# Patient Record
Sex: MALE | Race: WHITE | NOT HISPANIC OR LATINO | Employment: STUDENT | URBAN - METROPOLITAN AREA
[De-identification: names, ages, dates, MRNs, and addresses within clinical notes are randomized per-mention and may not be internally consistent; named-entity substitution may affect disease eponyms.]

---

## 2017-02-21 ENCOUNTER — TELEPHONE (OUTPATIENT)
Dept: FAMILY MEDICINE | Facility: CLINIC | Age: 22
End: 2017-02-21

## 2017-02-21 ENCOUNTER — HOSPITAL ENCOUNTER (EMERGENCY)
Facility: CLINIC | Age: 22
Discharge: HOME | End: 2017-02-21
Attending: FAMILY MEDICINE | Admitting: FAMILY MEDICINE
Payer: COMMERCIAL

## 2017-02-21 VITALS
SYSTOLIC BLOOD PRESSURE: 122 MMHG | HEIGHT: 69 IN | DIASTOLIC BLOOD PRESSURE: 70 MMHG | OXYGEN SATURATION: 98 % | BODY MASS INDEX: 19.99 KG/M2 | WEIGHT: 135 LBS | TEMPERATURE: 97.6 F | RESPIRATION RATE: 14 BRPM

## 2017-02-21 DIAGNOSIS — R11.2 NON-INTRACTABLE VOMITING WITH NAUSEA, UNSPECIFIED VOMITING TYPE: ICD-10-CM

## 2017-02-21 LAB
ANION GAP SERPL CALCULATED.3IONS-SCNC: 9 MMOL/L (ref 3–14)
BASOPHILS # BLD AUTO: 0 10E9/L (ref 0–0.2)
BASOPHILS NFR BLD AUTO: 0.1 %
BUN SERPL-MCNC: 22 MG/DL (ref 7–30)
CALCIUM SERPL-MCNC: 9 MG/DL (ref 8.5–10.1)
CHLORIDE SERPL-SCNC: 104 MMOL/L (ref 94–109)
CO2 SERPL-SCNC: 26 MMOL/L (ref 20–32)
CREAT SERPL-MCNC: 0.84 MG/DL (ref 0.66–1.25)
CRP SERPL-MCNC: <2.9 MG/L (ref 0–8)
DIFFERENTIAL METHOD BLD: ABNORMAL
EOSINOPHIL # BLD AUTO: 0 10E9/L (ref 0–0.7)
EOSINOPHIL NFR BLD AUTO: 0.1 %
ERYTHROCYTE [DISTWIDTH] IN BLOOD BY AUTOMATED COUNT: 12.5 % (ref 10–15)
GFR SERPL CREATININE-BSD FRML MDRD: ABNORMAL ML/MIN/1.7M2
GLUCOSE SERPL-MCNC: 102 MG/DL (ref 70–99)
HCT VFR BLD AUTO: 44.8 % (ref 40–53)
HGB BLD-MCNC: 16.1 G/DL (ref 13.3–17.7)
IMM GRANULOCYTES # BLD: 0 10E9/L (ref 0–0.4)
IMM GRANULOCYTES NFR BLD: 0.2 %
LYMPHOCYTES # BLD AUTO: 0.8 10E9/L (ref 0.8–5.3)
LYMPHOCYTES NFR BLD AUTO: 6 %
MCH RBC QN AUTO: 29.5 PG (ref 26.5–33)
MCHC RBC AUTO-ENTMCNC: 35.9 G/DL (ref 31.5–36.5)
MCV RBC AUTO: 82 FL (ref 78–100)
MONOCYTES # BLD AUTO: 0.8 10E9/L (ref 0–1.3)
MONOCYTES NFR BLD AUTO: 6.3 %
NEUTROPHILS # BLD AUTO: 11 10E9/L (ref 1.6–8.3)
NEUTROPHILS NFR BLD AUTO: 87.3 %
PLATELET # BLD AUTO: 227 10E9/L (ref 150–450)
POTASSIUM SERPL-SCNC: 3.5 MMOL/L (ref 3.4–5.3)
RBC # BLD AUTO: 5.46 10E12/L (ref 4.4–5.9)
SODIUM SERPL-SCNC: 139 MMOL/L (ref 133–144)
WBC # BLD AUTO: 12.6 10E9/L (ref 4–11)

## 2017-02-21 PROCEDURE — 25000125 ZZHC RX 250: Performed by: FAMILY MEDICINE

## 2017-02-21 PROCEDURE — 99284 EMERGENCY DEPT VISIT MOD MDM: CPT | Mod: 25

## 2017-02-21 PROCEDURE — 86140 C-REACTIVE PROTEIN: CPT | Performed by: FAMILY MEDICINE

## 2017-02-21 PROCEDURE — 99284 EMERGENCY DEPT VISIT MOD MDM: CPT | Performed by: FAMILY MEDICINE

## 2017-02-21 PROCEDURE — 80048 BASIC METABOLIC PNL TOTAL CA: CPT | Performed by: FAMILY MEDICINE

## 2017-02-21 PROCEDURE — 96361 HYDRATE IV INFUSION ADD-ON: CPT

## 2017-02-21 PROCEDURE — 96375 TX/PRO/DX INJ NEW DRUG ADDON: CPT

## 2017-02-21 PROCEDURE — 96374 THER/PROPH/DIAG INJ IV PUSH: CPT

## 2017-02-21 PROCEDURE — 85025 COMPLETE CBC W/AUTO DIFF WBC: CPT | Performed by: FAMILY MEDICINE

## 2017-02-21 PROCEDURE — 25000128 H RX IP 250 OP 636: Performed by: FAMILY MEDICINE

## 2017-02-21 RX ORDER — NAPROXEN 500 MG/1
TABLET ORAL
Qty: 15 TABLET | Refills: 0 | Status: SHIPPED | OUTPATIENT
Start: 2017-02-21 | End: 2017-06-06

## 2017-02-21 RX ORDER — ONDANSETRON 4 MG/1
4 TABLET, ORALLY DISINTEGRATING ORAL
Status: COMPLETED | OUTPATIENT
Start: 2017-02-21 | End: 2017-02-21

## 2017-02-21 RX ORDER — SODIUM CHLORIDE 9 MG/ML
1000 INJECTION, SOLUTION INTRAVENOUS CONTINUOUS
Status: DISCONTINUED | OUTPATIENT
Start: 2017-02-21 | End: 2017-02-21 | Stop reason: HOSPADM

## 2017-02-21 RX ORDER — KETOROLAC TROMETHAMINE 30 MG/ML
30 INJECTION, SOLUTION INTRAMUSCULAR; INTRAVENOUS ONCE
Status: COMPLETED | OUTPATIENT
Start: 2017-02-21 | End: 2017-02-21

## 2017-02-21 RX ORDER — ONDANSETRON 4 MG/1
2-4 TABLET, ORALLY DISINTEGRATING ORAL EVERY 8 HOURS PRN
Qty: 10 TABLET | Refills: 0 | Status: SHIPPED | OUTPATIENT
Start: 2017-02-21 | End: 2017-06-06

## 2017-02-21 RX ORDER — LIDOCAINE 40 MG/G
CREAM TOPICAL
Status: DISCONTINUED | OUTPATIENT
Start: 2017-02-21 | End: 2017-02-21 | Stop reason: HOSPADM

## 2017-02-21 RX ADMIN — ONDANSETRON 4 MG: 4 TABLET, ORALLY DISINTEGRATING ORAL at 04:41

## 2017-02-21 RX ADMIN — KETOROLAC TROMETHAMINE 30 MG: 30 INJECTION, SOLUTION INTRAMUSCULAR at 05:23

## 2017-02-21 RX ADMIN — PROCHLORPERAZINE EDISYLATE 5 MG: 5 INJECTION INTRAMUSCULAR; INTRAVENOUS at 05:20

## 2017-02-21 RX ADMIN — SODIUM CHLORIDE 1000 ML: 9 INJECTION, SOLUTION INTRAVENOUS at 05:20

## 2017-02-21 ASSESSMENT — ENCOUNTER SYMPTOMS
RESPIRATORY NEGATIVE: 1
NAUSEA: 1
EYES NEGATIVE: 1
BLOOD IN STOOL: 0
FEVER: 0
CARDIOVASCULAR NEGATIVE: 1
CONSTIPATION: 0
HEMATURIA: 0
FATIGUE: 1
ACTIVITY CHANGE: 1
PSYCHIATRIC NEGATIVE: 1
ANAL BLEEDING: 0
NEUROLOGICAL NEGATIVE: 1
DIARRHEA: 0
CHILLS: 1
VOMITING: 1
APPETITE CHANGE: 1
FLANK PAIN: 0

## 2017-02-21 NOTE — ED PROVIDER NOTES
History     Chief Complaint   Patient presents with     Vomiting     HPI  Steven Romeo is a 21 year old male who presented to the emergency room secondary to concerns about nausea and vomiting symptoms that started approximately 12:30 this morning.  States that he is was awakened from his sleep with sudden nausea and vomiting symptoms.  He's vomited multiple times since.  He states that his abdomen is sore but he thinks it just from puking so hard as he does not have significant pain otherwise.  He states his nausea somewhat improved after receiving the oral Zofran given to him by the nursing staff on arrival to the ER.  He denies vomiting blood.  Patient works at the [employer] and states that he was warned about the Noro-virus which has been present in the area recently.  He wonders if he might have this viral infection as a reason for his symptoms today.  He denies any diarrhea but states that he feels like he might have some soon.    I have reviewed the Medications, Allergies, Past Medical and Surgical History, and Social History in the Epic system.  Patient Active Problem List   Diagnosis     Generalized anxiety disorder     Moderate major depression (H)     Tobacco abuse     Hydrocephalus     Pineal germ cell tumor (H)     Rash and nonspecific skin eruption     Health Care Home     Status post chemotherapy       Past Medical History   Diagnosis Date     Hydrocephalus 2015     See MRI/CT     Infectious mononucleosis      age 7     Other chronic pain      headaches     Pineal tumor 2015     see MRI/CT     Pneumonia 9/13/2011     Pneumothorax on left 11/2014     spontaneous     Spontaneous pneumothorax 11/9/2014        Past Surgical History   Procedure Laterality Date     Insert chest tube Left 11/10/2014     Procedure: INSERT CHEST TUBE;  Surgeon: Damaso Westbrook MD;  Location: [facility]     Thoracoscopic pleurodesis Left 11/13/2014     Procedure: THORACOSCOPIC PLEURODESIS;  Surgeon: Dariana  MD Damsao;  Location: [facility] OR     Thoracoscopic blebectomy Left 11/13/2014     Procedure: THORACOSCOPIC BLEBECTOMY;  Surgeon: Damaso Westbrook MD;  Location: [facility] OR     Ventriculoscopy Right 3/25/2015     Procedure: VENTRICULOSCOPY;  Surgeon: Vasile Boyd MD;  Location: [facility]     Remove port vascular access Right 10/12/2015     Procedure: REMOVE PORT VASCULAR ACCESS;  Surgeon: Renato Arroyo MD;  [facility]: [facility]        Family History   Problem Relation Age of Onset     Family History Negative         Social History     Social History     Marital status: Single     Spouse name: N/A     Number of children: N/A     Years of education: N/A     Occupational History     Not on file.     Social History Main Topics     Smoking status: Current Every Day Smoker     Packs/day: 0.25     Years: 1.00     Types: Cigarettes     Last attempt to quit: 12/9/2014     Smokeless tobacco: Never Used      Comment: Mom smokes outside.     Alcohol use 0.0 oz/week     0 Standard drinks or equivalent per week      Comment: occasionally     Drug use: No     Sexual activity: No     Other Topics Concern     Not on file     Social History Narrative    Currently staying in the [facility] [place] with mom. Otherwise living in [place]. Home is in [place]. He states that he lost all of his friends when he got cancer as they were afraid they might get it. He denies any illicite drugs but states when this is all done he is going to celebrate with a beer.        Current Outpatient Rx   Medication Sig Dispense Refill     ondansetron (ZOFRAN-ODT) 4 MG ODT tab Take 0.5-1 tablets (2-4 mg) by mouth every 8 hours as needed for nausea 10 tablet 0     naproxen (NAPROSYN) 500 MG tablet 1 tablet (500mg) every 8-12 hours as needed for pain. Take with food and do not use with ibuprofen. 15 tablet 0       Allergies   Allergen Reactions     Gadolinium Itching     Redness/Itching immediately with Gadolinium injection.  Patient reacts even with  "premedication.  [facility] 4-1--2016       Immunization History   Administered Date(s) Administered     DPT 1995, 1995, 02/06/1996, 10/16/1996     DT (PEDS <7y) 04/19/2007     HIB 1995, 1995, 02/06/1996, 10/16/1996     Hepatitis A Vac Ped/Adol-2 Dose 04/19/2007     Hepatitis B 1995, 1995, 02/06/1996     Influenza (IIV3) 12/05/2011     Influenza Vaccine IM 3yrs+ 4 Valent IIV4 03/30/2015     MMR 10/16/1996, 03/22/2002     OPV 1995, 1995, 02/06/1996     Pneumococcal 23 valent 12/05/2011     Varicella Not Indicated - By Hx 10/04/2000       Review of Systems   Constitutional: Positive for activity change, appetite change, chills and fatigue. Negative for fever.   HENT: Negative.    Eyes: Negative.    Respiratory: Negative.    Cardiovascular: Negative.    Gastrointestinal: Positive for nausea and vomiting. Negative for anal bleeding, blood in stool, constipation and diarrhea.   Genitourinary: Negative for flank pain and hematuria.   Skin: Negative.    Neurological: Negative.    Psychiatric/Behavioral: Negative.    All other systems reviewed and are negative.      Physical Exam   BP: 102/60  Heart Rate: 100  Temp: 97.6  F (36.4  C)  Resp: 16  Height: 175.3 cm (5' 9\")  Weight: 61.2 kg (135 lb)  SpO2: 100 %  Physical Exam   Constitutional: He is oriented to person, place, and time. He appears well-developed and well-nourished. He appears distressed.   HENT:   Head: Atraumatic.   Mouth/Throat: Oropharynx is clear and moist.   Eyes: Conjunctivae and EOM are normal. Pupils are equal, round, and reactive to light.   Neck: Normal range of motion. Neck supple.   Cardiovascular: Normal rate.    Pulmonary/Chest: Effort normal. No respiratory distress.   Abdominal: Soft. He exhibits no distension and no mass. There is tenderness (mild diffuse tenderness but no point specific tenderness on exam of the abdomen.). There is no rebound and no guarding.   Musculoskeletal: Normal range of motion. "   Neurological: He is alert and oriented to person, place, and time.   Skin: Skin is warm. No rash noted.   Nursing note and vitals reviewed.      ED Course     ED Course     Procedures             Critical Care time:  none               Results for orders placed or performed during the hospital encounter of 02/21/17 (from the past 24 hour(s))   CBC with platelets differential   Result Value Ref Range    WBC 12.6 (H) 4.0 - 11.0 10e9/L    RBC Count 5.46 4.4 - 5.9 10e12/L    Hemoglobin 16.1 13.3 - 17.7 g/dL    Hematocrit 44.8 40.0 - 53.0 %    MCV 82 78 - 100 fl    MCH 29.5 26.5 - 33.0 pg    MCHC 35.9 31.5 - 36.5 g/dL    RDW 12.5 10.0 - 15.0 %    Platelet Count 227 150 - 450 10e9/L    Diff Method Automated Method     % Neutrophils 87.3 %    % Lymphocytes 6.0 %    % Monocytes 6.3 %    % Eosinophils 0.1 %    % Basophils 0.1 %    % Immature Granulocytes 0.2 %    Absolute Neutrophil 11.0 (H) 1.6 - 8.3 10e9/L    Absolute Lymphocytes 0.8 0.8 - 5.3 10e9/L    Absolute Monocytes 0.8 0.0 - 1.3 10e9/L    Absolute Eosinophils 0.0 0.0 - 0.7 10e9/L    Absolute Basophils 0.0 0.0 - 0.2 10e9/L    Abs Immature Granulocytes 0.0 0 - 0.4 10e9/L   Basic metabolic panel   Result Value Ref Range    Sodium 139 133 - 144 mmol/L    Potassium 3.5 3.4 - 5.3 mmol/L    Chloride 104 94 - 109 mmol/L    Carbon Dioxide 26 20 - 32 mmol/L    Anion Gap 9 3 - 14 mmol/L    Glucose 102 (H) 70 - 99 mg/dL    Urea Nitrogen 22 7 - 30 mg/dL    Creatinine 0.84 0.66 - 1.25 mg/dL    GFR Estimate >90  Non  GFR Calc   >60 mL/min/1.7m2    GFR Estimate If Black >90   GFR Calc   >60 mL/min/1.7m2    Calcium 9.0 8.5 - 10.1 mg/dL   CRP inflammation   Result Value Ref Range    CRP Inflammation <2.9 0.0 - 8.0 mg/L       Medications ordered to be administered in the ER:    Medications   lidocaine 1 % 1 mL (not administered)   lidocaine (LMX4) kit (not administered)   sodium chloride (PF) 0.9% PF flush 3 mL (not administered)   sodium chloride  (PF) 0.9% PF flush 3 mL (not administered)   0.9% sodium chloride BOLUS (not administered)     Followed by   0.9% sodium chloride BOLUS (1,000 mLs Intravenous New Bag 2/21/17 0520)     Followed by   0.9% sodium chloride infusion (not administered)   ondansetron (ZOFRAN-ODT) ODT tab 4 mg (4 mg Oral Given 2/21/17 6941)   prochlorperazine (COMPAZINE) injection 5 mg (5 mg Intravenous Given 2/21/17 0520)   ketorolac (TORADOL) injection 30 mg (30 mg Intravenous Given 2/21/17 0523)       Assessments & Plan (with Medical Decision Making)  Patient states he is feeling much improved after the IV fluids and medications given to him as listed above.  He states he would like to go home and sleep.  Is unable to tolerate oral fluids and ice chips without additional nausea or vomiting.  Medication prescribed to use if needed for additional symptoms.  He agrees to return should he have worsening symptoms.  Handouts given to him to read and follow.     I have reviewed the nursing notes.    I have reviewed the findings, diagnosis, plan and need for follow up with the patient and his mother.    New Prescriptions    NAPROXEN (NAPROSYN) 500 MG TABLET    1 tablet (500mg) every 8-12 hours as needed for pain. Take with food and do not use with ibuprofen.    ONDANSETRON (ZOFRAN-ODT) 4 MG ODT TAB    Take 0.5-1 tablets (2-4 mg) by mouth every 8 hours as needed for nausea            I verbally discussed the findings of the evaluation today in the ER. I have verbally discussed with Steven the suggested treatment(s) as described in the discharge instructions and handouts. I have prescribed the above listed medications and instructed him on appropriate use of these medications.      I have verbally suggested he follow-up in his clinic or return to the ER for increased symptoms. See the follow-up recommendations documented  in the after visit summary in this visit's EPIC chart.      Final diagnoses:   Non-intractable vomiting with nausea,  unspecified vomiting type       2/21/2017   [facility] EMERGENCY DEPARTMENT     Priyank Gold, DO  02/21/17 0613       Priyank Gold, DO  02/21/17 0618

## 2017-02-21 NOTE — ED AVS SNAPSHOT
[facility] Emergency Department    [address]     [place] [address]    Phone:  [phone]    Fax:  [phone]                                       Steven Romeo   MRN: [id number]    Department:  [facility] Emergency Department   Date of Visit:  2/21/2017           Patient Information     Date Of Birth          1995        Your diagnoses for this visit were:     Non-intractable vomiting with nausea, unspecified vomiting type        You were seen by Priyank Gold DO.      Follow-up Information     Follow up with Rita Krause PA-C.    Specialty:  Family Practice    Why:  if not improved in 2-3 days    Contact information:    [facility]  [address]   [place] [address] [phone]          Follow up with [facility] Emergency Department.    Specialty:  EMERGENCY MEDICINE    Why:  If symptoms worsen    Contact information:    [address]   [place] [address] [phone]    Additional information:    From [address]: Exit at [address] on south side of [place]. Turn right on [address]. Turn left at stoplight on [address]. [facility] will be in view two blocks ahead        Discharge Instructions       Please read and follow the handout(s) instructions. Return, if needed, for increased or worsening symptoms and as directed by the handout(s).    Increase your fluid intake. Drinking small amounts often is the best way to take in more fluids when you are feeling nauseated.    See the note for work.    I would expect you to be improved in the next 1-2 days.    I hope you feel better soon!    Electronically signed, Priyank Gold DO        Discharge References/Attachments     VOMITING (6Y-ADULT) (ENGLISH)    FOOD POISONING OR GASTROENTERITIS (ADULT) (ENGLISH)    FOOD POISONING, PREVENTING (ENGLISH)      24 Hour Appointment Hotline       To make an appointment at any [facility], call  [phone]). If you don't have a family doctor or clinic, we will help you find one. [facility] are conveniently located to serve the needs of you and your family.             Review of your medicines      START taking        Dose / Directions Last dose taken    naproxen 500 MG tablet   Commonly known as:  NAPROSYN   Quantity:  15 tablet        1 tablet (500mg) every 8-12 hours as needed for pain. Take with food and do not use with ibuprofen.   Refills:  0        ondansetron 4 MG ODT tab   Commonly known as:  ZOFRAN-ODT   Dose:  2-4 mg   Quantity:  10 tablet        Take 0.5-1 tablets (2-4 mg) by mouth every 8 hours as needed for nausea   Refills:  0                Prescriptions were sent or printed at these locations (2 Prescriptions)                   [facility]   [address] [address]    Telephone:  [phone]   Fax:  [phone]   Hours:                  These medications are not ready yet because we are checking if your insurance will help you pay for them. Call your pharmacy to confirm that your medication is ready for pickup. It may take up to 24 hours for them to receive the prescription. If the prescription is not ready within 3 business days, please contact your clinic or your provider (2 of 2)         ondansetron (ZOFRAN-ODT) 4 MG ODT tab               naproxen (NAPROSYN) 500 MG tablet                Procedures and tests performed during your visit     Basic metabolic panel    CBC with platelets differential    CRP inflammation      Orders Needing Specimen Collection     Ordered          02/21/17 9965  UA reflex to Microscopic and Culture - STAT, Prio: STAT, Needs to be Collected     Scheduled Task Status   02/21/17 0508 Collect UA reflex to Microscopic and Culture Open   Order Class:  PCU Collect                  Pending Results     No orders found from 2/19/2017 to 2/22/2017.            Pending Culture Results     No orders  found from 2/19/2017 to 2/22/2017.            Thank you for choosing [facility]       Thank you for choosing [facility] for your care. Our goal is always to provide you with excellent care. Hearing back from our patients is one way we can continue to improve our services. Please take a few minutes to complete the written survey that you may receive in the mail after you visit with us. Thank you!        [employer] Information     [employer] gives you secure access to your electronic health record. If you see a primary care provider, you can also send messages to your care team and make appointments. If you have questions, please call your primary care clinic.  If you do not have a primary care provider, please call [phone] and they will assist you.        Care EveryWhere ID     This is your Care EveryWhere ID. This could be used by other organizations to access your [facility] medical records  [id number]        After Visit Summary       This is your record. Keep this with you and show to your community pharmacist(s) and doctor(s) at your next visit.

## 2017-02-21 NOTE — TELEPHONE ENCOUNTER
Tried to reach out to patient and was unable to leave a message due to no voicemail or mailbox was full. Will try again at a later time.    Thank you,  Charisse Sandoval   for [facility]

## 2017-02-21 NOTE — ED AVS SNAPSHOT
[facility] Emergency Department    [address] [place] [address]    Phone:  [phone]    Fax:  [phone]                                       Steven Rmoeo   MRN: [id number]    Department:  [facility] Emergency Department   Date of Visit:  2/21/2017           After Visit Summary Signature Page     I have received my discharge instructions, and my questions have been answered. I have discussed any challenges I see with this plan with the nurse or doctor.    ..........................................................................................................................................  Patient/Patient Representative Signature      ..........................................................................................................................................  Patient Representative Print Name and Relationship to Patient    ..................................................               ................................................  Date                                            Time    ..........................................................................................................................................  Reviewed by Signature/Title    ...................................................              ..............................................  Date                                                            Time

## 2017-02-21 NOTE — LETTER
[facility]  Emergency Room  [address] [place]   [address]  Tel: [phone]   Fax: [phone]  2017    Steven Romeo  [address] [facility] [id number] [phone] (home) none (work)    : 1995          To Whom it May Concern:    Steven Romeo was seen in our ER today 2017. I expect his condition to improve over the next 2-3 days.  He may return to work, without restriction, when improved. If not improved during the above expected time period,  then I have encouraged him to be rechecked in his clinic for further evaluation.      Please contact me for questions or concerns.    Sincerely,      Priyank Gold  Physician  [facility] Emergency Room

## 2017-02-21 NOTE — ED NOTES
Pt states that he has been vomiting since 0100 this morning.  States that his stomach aches from puking.  Pt's mother is present with pt.  She states that with pt's history they were told that he should be checked out when he is ill.  Pt has the chills and shaky.

## 2017-02-21 NOTE — DISCHARGE INSTRUCTIONS
Please read and follow the handout(s) instructions. Return, if needed, for increased or worsening symptoms and as directed by the handout(s).    Increase your fluid intake. Drinking small amounts often is the best way to take in more fluids when you are feeling nauseated.    See the note for work.    I would expect you to be improved in the next 1-2 days.    I hope you feel better soon!    Electronically signed, Priyank Gold DO

## 2017-02-21 NOTE — ED NOTES
Pt is vomiting at this time.  Pt was having the chills and now is feeling hot.  Zofran ODT administered to pt.

## 2017-02-23 ENCOUNTER — TELEPHONE (OUTPATIENT)
Dept: FAMILY MEDICINE | Facility: CLINIC | Age: 22
End: 2017-02-23

## 2017-02-23 NOTE — TELEPHONE ENCOUNTER
Reason for call:  Patient reporting a symptom    Symptom or request: nuvo virus    Duration (how long have symptoms been present):     Have you been treated for this before? Yes    Additional comments: Pt was in ER 2/21, diagnosed with Nuvo virus. Mom states he's very pale looking still. She wanted an appt today, but there are no openings. Pt does have appt 2/24 with Natacha, but mother is concerned. Mother states Steven is with her all day, that the nurse will want to talk to him.     Phone Number patient can be reached at:  [phone]     Best Time:  anytime    Can we leave a detailed message on this number:  YES    Call taken on 2/23/2017 at 10:59 AM by Marianne Lewis

## 2017-02-23 NOTE — TELEPHONE ENCOUNTER
"RN has attempted to contact this patient by phone to return their call, but there is no response.  Left message to \"call clinic at earliest convenience\".  Will try again later.     Estella Brito RN      "

## 2017-02-24 ENCOUNTER — OFFICE VISIT (OUTPATIENT)
Dept: FAMILY MEDICINE | Facility: CLINIC | Age: 22
End: 2017-02-24
Payer: COMMERCIAL

## 2017-02-24 VITALS
WEIGHT: 126.7 LBS | BODY MASS INDEX: 18.71 KG/M2 | DIASTOLIC BLOOD PRESSURE: 64 MMHG | HEART RATE: 80 BPM | TEMPERATURE: 97.4 F | OXYGEN SATURATION: 100 % | SYSTOLIC BLOOD PRESSURE: 114 MMHG

## 2017-02-24 DIAGNOSIS — R11.2 NAUSEA AND VOMITING, INTRACTABILITY OF VOMITING NOT SPECIFIED, UNSPECIFIED VOMITING TYPE: Primary | ICD-10-CM

## 2017-02-24 DIAGNOSIS — R53.83 FATIGUE, UNSPECIFIED TYPE: ICD-10-CM

## 2017-02-24 LAB
ALBUMIN SERPL-MCNC: 4.7 G/DL (ref 3.4–5)
ALP SERPL-CCNC: 54 U/L (ref 40–150)
ALT SERPL W P-5'-P-CCNC: 18 U/L (ref 0–70)
ANION GAP SERPL CALCULATED.3IONS-SCNC: 10 MMOL/L (ref 3–14)
AST SERPL W P-5'-P-CCNC: 14 U/L (ref 0–45)
BASOPHILS # BLD AUTO: 0 10E9/L (ref 0–0.2)
BASOPHILS NFR BLD AUTO: 0.6 %
BILIRUB SERPL-MCNC: 0.5 MG/DL (ref 0.2–1.3)
BUN SERPL-MCNC: 10 MG/DL (ref 7–30)
CALCIUM SERPL-MCNC: 9.1 MG/DL (ref 8.5–10.1)
CHLORIDE SERPL-SCNC: 103 MMOL/L (ref 94–109)
CO2 SERPL-SCNC: 28 MMOL/L (ref 20–32)
CREAT SERPL-MCNC: 0.87 MG/DL (ref 0.66–1.25)
DIFFERENTIAL METHOD BLD: NORMAL
EOSINOPHIL # BLD AUTO: 0.1 10E9/L (ref 0–0.7)
EOSINOPHIL NFR BLD AUTO: 1.5 %
ERYTHROCYTE [DISTWIDTH] IN BLOOD BY AUTOMATED COUNT: 12.7 % (ref 10–15)
GFR SERPL CREATININE-BSD FRML MDRD: NORMAL ML/MIN/1.7M2
GLUCOSE SERPL-MCNC: 91 MG/DL (ref 70–99)
HCT VFR BLD AUTO: 42.7 % (ref 40–53)
HETEROPH AB SER QL: NEGATIVE
HGB BLD-MCNC: 15.1 G/DL (ref 13.3–17.7)
IMM GRANULOCYTES # BLD: 0 10E9/L (ref 0–0.4)
IMM GRANULOCYTES NFR BLD: 0.6 %
LYMPHOCYTES # BLD AUTO: 1.7 10E9/L (ref 0.8–5.3)
LYMPHOCYTES NFR BLD AUTO: 35.9 %
MCH RBC QN AUTO: 29.8 PG (ref 26.5–33)
MCHC RBC AUTO-ENTMCNC: 35.4 G/DL (ref 31.5–36.5)
MCV RBC AUTO: 84 FL (ref 78–100)
MONOCYTES # BLD AUTO: 0.5 10E9/L (ref 0–1.3)
MONOCYTES NFR BLD AUTO: 11.3 %
NEUTROPHILS # BLD AUTO: 2.4 10E9/L (ref 1.6–8.3)
NEUTROPHILS NFR BLD AUTO: 50.1 %
PLATELET # BLD AUTO: 210 10E9/L (ref 150–450)
POTASSIUM SERPL-SCNC: 3.7 MMOL/L (ref 3.4–5.3)
PROT SERPL-MCNC: 7.9 G/DL (ref 6.8–8.8)
RBC # BLD AUTO: 5.06 10E12/L (ref 4.4–5.9)
SODIUM SERPL-SCNC: 141 MMOL/L (ref 133–144)
TSH SERPL DL<=0.05 MIU/L-ACNC: 1.03 MU/L (ref 0.4–4)
WBC # BLD AUTO: 4.8 10E9/L (ref 4–11)

## 2017-02-24 PROCEDURE — 36415 COLL VENOUS BLD VENIPUNCTURE: CPT | Performed by: FAMILY MEDICINE

## 2017-02-24 PROCEDURE — 86308 HETEROPHILE ANTIBODY SCREEN: CPT | Performed by: FAMILY MEDICINE

## 2017-02-24 PROCEDURE — 84443 ASSAY THYROID STIM HORMONE: CPT | Performed by: FAMILY MEDICINE

## 2017-02-24 PROCEDURE — 99213 OFFICE O/P EST LOW 20 MIN: CPT | Performed by: FAMILY MEDICINE

## 2017-02-24 PROCEDURE — 80053 COMPREHEN METABOLIC PANEL: CPT | Performed by: FAMILY MEDICINE

## 2017-02-24 PROCEDURE — 85025 COMPLETE CBC W/AUTO DIFF WBC: CPT | Performed by: FAMILY MEDICINE

## 2017-02-24 ASSESSMENT — PAIN SCALES - GENERAL: PAINLEVEL: MILD PAIN (2)

## 2017-02-24 NOTE — MR AVS SNAPSHOT
After Visit Summary   2/24/2017    Steven Romeo    MRN: [id number]           Patient Information     Date Of Birth          1995        Visit Information        Provider Department      2/24/2017 9:20 AM Vaughn Manzano MD [facility]        Today's Diagnoses     Nausea and vomiting, intractability of vomiting not specified, unspecified vomiting type    -  1    Fatigue, unspecified type           Follow-ups after your visit        Follow-up notes from your care team     Return if symptoms worsen or fail to improve.      Who to contact     If you have questions or need follow up information about today's clinic visit or your schedule please contact [facility] directly at [phone].  Normal or non-critical lab and imaging results will be communicated to you by [employer], letter or phone within 4 business days after the clinic has received the results. If you do not hear from us within 7 days, please contact the clinic through deskwolfhart or phone. If you have a critical or abnormal lab result, we will notify you by phone as soon as possible.  Submit refill requests through [employer] or call your pharmacy and they will forward the refill request to us. Please allow 3 business days for your refill to be completed.          Additional Information About Your Visit        MyChart Information     [employer] gives you secure access to your electronic health record. If you see a primary care provider, you can also send messages to your care team and make appointments. If you have questions, please call your primary care clinic.  If you do not have a primary care provider, please call [phone] and they will assist you.        Care EveryWhere ID     This is your Care EveryWhere ID. This could be used by other organizations to access your [facility] medical records  [id number]        Your Vitals Were     Pulse Temperature Pulse Oximetry BMI (Body Mass Index)          80 97.4  F (36.3   C) (Temporal) 100% 18.71 kg/m2         Blood Pressure from Last 3 Encounters:   02/24/17 114/64   02/21/17 122/70   12/16/16 120/78    Weight from Last 3 Encounters:   02/24/17 126 lb 11.2 oz (57.5 kg)   02/21/17 135 lb (61.2 kg)   12/16/16 133 lb (60.3 kg)              We Performed the Following     CBC with platelets and differential     Comprehensive metabolic panel (BMP + Alb, Alk Phos, ALT, AST, Total. Bili, TP)     Mononucleosis screen     TSH        Primary Care Provider Office Phone # Fax #    Rita PARUL Krause PA-C [phone]       [address] [place] [address]        Thank you!     Thank you for choosing [facility]  for your care. Our goal is always to provide you with excellent care. Hearing back from our patients is one way we can continue to improve our services. Please take a few minutes to complete the written survey that you may receive in the mail after your visit with us. Thank you!             Your Updated Medication List - Protect others around you: Learn how to safely use, store and throw away your medicines at www.disposemymeds.org.          This list is accurate as of: 2/24/17 11:59 PM.  Always use your most recent med list.                   Brand Name Dispense Instructions for use    naproxen 500 MG tablet    NAPROSYN    15 tablet    1 tablet (500mg) every 8-12 hours as needed for pain. Take with food and do not use with ibuprofen.       ondansetron 4 MG ODT tab    ZOFRAN-ODT    10 tablet    Take 0.5-1 tablets (2-4 mg) by mouth every 8 hours as needed for nausea

## 2017-02-24 NOTE — LETTER
[address] [address]  [phone]    February 24, 2017        Steven S Ousmane  [address] [facility] [address]          To whom it may concern:    This patient missed school 2/24/2017 due to a clinic visit.      Please contact me for questions or concerns.        Sincerely,        Vaughn Herrera Mai, MD

## 2017-02-24 NOTE — LETTER
[address] [address]  [phone]    February 24, 2017        Steven Mullinsraizagilberto  [address] [facility] [address]          To whom it may concern:    This patient missed work on 2/24/2017 due to a clinic visit.  Patient will not be able to return to work until 2/27/17.    Please contact me for questions or concerns.        Sincerely,        Vaughn Herrera Mai, MD

## 2017-02-24 NOTE — NURSING NOTE
"Chief Complaint   Patient presents with     Follow Up For     ED Visit 2/21/17-Vomiting       Initial /64 (BP Location: Left arm, Patient Position: Chair, Cuff Size: Adult Regular)  Pulse 80  Temp 97.4  F (36.3  C) (Temporal)  Wt 126 lb 11.2 oz (57.5 kg)  SpO2 100%  BMI 18.71 kg/m2 Estimated body mass index is 18.71 kg/(m^2) as calculated from the following:    Height as of 2/21/17: 5' 9\" (1.753 m).    Weight as of this encounter: 126 lb 11.2 oz (57.5 kg).  Medication Reconciliation: complete  Suzan Leon MA  "

## 2017-02-24 NOTE — PROGRESS NOTES
SUBJECTIVE:                                                    Steven Romeo is a 21 year old male who presents to clinic today for the following health issues:    ED/UC Followup:    Facility:  [facility]  Date of visit: 2/21/2017  Reason for visit: Vomiting  Current Status: A little better.  Not Vomiting anymore, but still fatigued     Steven is here wit girlfriend and mother for ER follow-up. He was seen in the ER on 2/21/17 for acute onsets of nauseate and vomiting. In the ER, workup showed normal CRP, CMP and CBC except the white blood count was slightly elevated.   he was given IV fluids and antinausea medication and was feeling better. We'll discharge home with conservative management. He was given Zofran to be taken as needed. He been taking Zofran every 4 hours a hours as needed and that is helping. No more nausea or vomiting and been drinking a lot of water. However, he has been feeling very tire and exhausted in the last 4 days. No Also has little headache on and off; no dizziness. No running nose or nasal congestion.  No sinus pain or pressure.  Has the chill with sweat. No ST.  Works at [employer], exposed to many people.  Feel bloated and nuaseate.  No unusual food.  No diarrhea or constipation.  Last normal BM was yesterday.  No black stool. No recent hx of traveling.  Been drinking of water.  Also been burping and has flatulence.  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  Also been taking Naprosyn couple times a day for pain since discharged from the ER.      Problem list and histories reviewed & adjusted, as indicated.  Additional history: as documented    Patient Active Problem List   Diagnosis     Generalized anxiety disorder     Moderate major depression (H)     Tobacco abuse     Hydrocephalus     Pineal germ cell tumor (H)     Rash and nonspecific skin eruption     Health Care Home     Status post chemotherapy     Past Surgical History   Procedure Laterality  Date     Insert chest tube Left 11/10/2014     Procedure: INSERT CHEST TUBE;  Surgeon: Damaso Westbrook MD;  Location: [facility] OR     Thoracoscopic pleurodesis Left 11/13/2014     Procedure: THORACOSCOPIC PLEURODESIS;  Surgeon: Damaso Westbrook MD;  Location: [facility] OR     Thoracoscopic blebectomy Left 11/13/2014     Procedure: THORACOSCOPIC BLEBECTOMY;  Surgeon: Damaso Westbrook MD;  Location: [facility]     Ventriculoscopy Right 3/25/2015     Procedure: VENTRICULOSCOPY;  Surgeon: Vasile Boyd MD;  Location: [facility]     Remove port vascular access Right 10/12/2015     Procedure: REMOVE PORT VASCULAR ACCESS;  Surgeon: Renato Arroyo MD;  Location: [facility]        Social History   Substance Use Topics     Smoking status: Current Every Day Smoker     Packs/day: 0.25     Years: 1.00     Types: Cigarettes     Last attempt to quit: 12/9/2014     Smokeless tobacco: Never Used      Comment: Mom smokes outside.     Alcohol use 0.0 oz/week     0 Standard drinks or equivalent per week      Comment: occasionally     Family History   Problem Relation Age of Onset     Family History Negative           Current Outpatient Prescriptions   Medication Sig Dispense Refill     ondansetron (ZOFRAN-ODT) 4 MG ODT tab Take 0.5-1 tablets (2-4 mg) by mouth every 8 hours as needed for nausea 10 tablet 0     naproxen (NAPROSYN) 500 MG tablet 1 tablet (500mg) every 8-12 hours as needed for pain. Take with food and do not use with ibuprofen. 15 tablet 0     Allergies   Allergen Reactions     Gadolinium Itching     Redness/Itching immediately with Gadolinium injection.  Patient reacts even with premedication.  ksa 4-1--2016       ROS:  Constitutional, HEENT, cardiovascular, pulmonary, gi and gu systems are negative, except as otherwise noted.    OBJECTIVE:                                                    /64 (BP Location: Left arm, Patient Position: Chair, Cuff Size: Adult Regular)  Pulse 80  Temp 97.4  F (36.3  C)  (Temporal)  Wt 126 lb 11.2 oz (57.5 kg)  SpO2 100%  BMI 18.71 kg/m2  Body mass index is 18.71 kg/(m^2).  GENERAL: alert and no distress.  He looks tire but not acutely sick.  HENT: ear canals and TM's normal, nose and mouth without ulcers or lesions.  Nares are non-congested. Oropharynx is pink and moist. No tender with palpation to the sinuses.  NECK: no adenopathy, supple and thyroid normal to palpation  RESP: lungs clear to auscultation - no rales, rhonchi or wheezes  CV: regular rate and rhythm, no murmur, no peripheral edema and peripheral pulses strong  ABDOMEN: soft, non-distended, no palpable masses but little bowel sounds normal.  Mild tender with palpation to to lower abdomen with no guarding or rebound.    MS: no gross musculoskeletal defects noted, no edema. No weakness  SKIN: no suspicious lesions or rashes. Pale looking    Diagnostic Test Results:  Results for orders placed or performed in visit on 02/24/17   CBC with platelets and differential   Result Value Ref Range    WBC 4.8 4.0 - 11.0 10e9/L    RBC Count 5.06 4.4 - 5.9 10e12/L    Hemoglobin 15.1 13.3 - 17.7 g/dL    Hematocrit 42.7 40.0 - 53.0 %    MCV 84 78 - 100 fl    MCH 29.8 26.5 - 33.0 pg    MCHC 35.4 31.5 - 36.5 g/dL    RDW 12.7 10.0 - 15.0 %    Platelet Count 210 150 - 450 10e9/L    Diff Method Automated Method     % Neutrophils 50.1 %    % Lymphocytes 35.9 %    % Monocytes 11.3 %    % Eosinophils 1.5 %    % Basophils 0.6 %    % Immature Granulocytes 0.6 %    Absolute Neutrophil 2.4 1.6 - 8.3 10e9/L    Absolute Lymphocytes 1.7 0.8 - 5.3 10e9/L    Absolute Monocytes 0.5 0.0 - 1.3 10e9/L    Absolute Eosinophils 0.1 0.0 - 0.7 10e9/L    Absolute Basophils 0.0 0.0 - 0.2 10e9/L    Abs Immature Granulocytes 0.0 0 - 0.4 10e9/L   Comprehensive metabolic panel (BMP + Alb, Alk Phos, ALT, AST, Total. Bili, TP)   Result Value Ref Range    Sodium 141 133 - 144 mmol/L    Potassium 3.7 3.4 - 5.3 mmol/L    Chloride 103 94 - 109 mmol/L    Carbon Dioxide 28  20 - 32 mmol/L    Anion Gap 10 3 - 14 mmol/L    Glucose 91 70 - 99 mg/dL    Urea Nitrogen 10 7 - 30 mg/dL    Creatinine 0.87 0.66 - 1.25 mg/dL    GFR Estimate >90  Non  GFR Calc   >60 mL/min/1.7m2    GFR Estimate If Black >90   GFR Calc   >60 mL/min/1.7m2    Calcium 9.1 8.5 - 10.1 mg/dL    Bilirubin Total 0.5 0.2 - 1.3 mg/dL    Albumin 4.7 3.4 - 5.0 g/dL    Protein Total 7.9 6.8 - 8.8 g/dL    Alkaline Phosphatase 54 40 - 150 U/L    ALT 18 0 - 70 U/L    AST 14 0 - 45 U/L   TSH   Result Value Ref Range    TSH 1.03 0.40 - 4.00 mU/L   Mononucleosis screen   Result Value Ref Range    Mononucleosis Screen Negative NEG        Reviewed medical record from the ER.  ASSESSMENT/PLAN:                                                        ICD-10-CM    1. Nausea and vomiting, intractability of vomiting not specified, unspecified vomiting type R11.2 CBC with platelets and differential     Comprehensive metabolic panel (BMP + Alb, Alk Phos, ALT, AST, Total. Bili, TP)   2. Fatigue, unspecified type R53.83 CBC with platelets and differential     TSH     Mononucleosis screen      Overall his nausea and vomiting are resolving. Tolerated by mouth intake well. Patient stated he been drinking a lot of water. Physical exam did not suggest of dehydration. Encouraged him to taper off the Zofran and take it take it only as needed. Stop the naproxen.  Patient is tired and pale. We'll check his lab including CBC, CMP, TSH and mono.  Encouraged to take it easy and drink a lot of water. Advance diet as tolerated. A note for him to be off work in the next couple days was given.  Most likely had a viral infection and discuss with him about the nature condition. Patient, his girlfriend and his mother feel comfortable with the plan. They were educated about symptoms that need to be seen or call in.      Vaughn Herrera Mai, MD  [facility]

## 2017-06-05 ENCOUNTER — TELEPHONE (OUTPATIENT)
Dept: FAMILY MEDICINE | Facility: CLINIC | Age: 22
End: 2017-06-05

## 2017-06-05 NOTE — TELEPHONE ENCOUNTER
Reason for call:  Patient reporting a symptom    Symptom or request: back pain near kidneys. No know injury. Requesting to speak to a nurse    Duration (how long have symptoms been present): 3-4 days    Have you been treated for this before? No    Additional comments:     Phone Number patient can be reached at:  Home number on file [phone] (home)    Best Time:  anytime    Can we leave a detailed message on this number:  YES    Call taken on 6/5/2017 at 7:20 AM by Aleyda Ferguson

## 2017-06-05 NOTE — TELEPHONE ENCOUNTER
Called pt and asked him how he was. He said fine. My mom is just worried she called and talked to the triage nurse this am I didn't.  I offered him an appt with LINDA cause HSL is out till wed.  He asked about Natacha. Cause he seen him in Feb.  He is out also till Thursday. So I offered him to see LINDA tomorrow am at 830.  He said that is just fine.  KH

## 2017-06-05 NOTE — TELEPHONE ENCOUNTER
": 1995  PHONE #'s: [phone] (home) none (work)    PRESENTING PROBLEM:  C/O intermittent pains around the kidney area, bilaterally, for about a week.     NURSING ASSESSMENT  Description:  He was getting Chemotherapy  2 years ago (brain cancer.)  Onset/duration:   < 1 week. \" Yesterday, it was really bad, like when bending over. He wanted me to call and see if he can get in with Rita.\"  Precip. factors:   Hx of brain cancer.   Assoc. Sx:  NO other SX  Improves/worsens Sx:   Same   Pain scale (1-10)   5/10 Yesterday.   Sx specific meds:  NONE.   Last exam/Tx:   Has NOT been seen for this.     RECOMMENDED DISPOSITION:  See in 24 hours - Hx of brain cancer.  RN will forward message on to KAMERON Navarro, as she is his PCP.  Please call and let mom know if he can be worked in today or tomorrow. C# [phone].  Will comply with recommendation: YES  If further questions/concerns or if Sx do not improve, worsen or new Sx develop, call your PCP or [facility] as soon as possible.    NOTES:  Disposition was determined by the first positive assessment question, therefore all previous assessment questions were negative.  Informed to check provider manual or call insurance company to assure coverage.     Guideline Used:  BAck Pain, adult  Telephone Triage Protocols for Nurses, Fifth Edition, Lee Ann Ford RN  2017    "

## 2017-06-06 ENCOUNTER — OFFICE VISIT (OUTPATIENT)
Dept: FAMILY MEDICINE | Facility: CLINIC | Age: 22
End: 2017-06-06
Payer: COMMERCIAL

## 2017-06-06 VITALS
HEIGHT: 69 IN | TEMPERATURE: 98.5 F | WEIGHT: 132 LBS | HEART RATE: 84 BPM | RESPIRATION RATE: 18 BRPM | SYSTOLIC BLOOD PRESSURE: 116 MMHG | OXYGEN SATURATION: 100 % | DIASTOLIC BLOOD PRESSURE: 70 MMHG | BODY MASS INDEX: 19.55 KG/M2

## 2017-06-06 DIAGNOSIS — F32.5 DEPRESSION, MAJOR, IN REMISSION (H): ICD-10-CM

## 2017-06-06 DIAGNOSIS — M54.50 ACUTE RIGHT-SIDED LOW BACK PAIN WITHOUT SCIATICA: Primary | ICD-10-CM

## 2017-06-06 PROCEDURE — 99213 OFFICE O/P EST LOW 20 MIN: CPT | Performed by: FAMILY MEDICINE

## 2017-06-06 RX ORDER — CYCLOBENZAPRINE HCL 10 MG
10 TABLET ORAL 3 TIMES DAILY PRN
Qty: 30 TABLET | Refills: 0 | Status: SHIPPED | OUTPATIENT
Start: 2017-06-06 | End: 2017-08-11

## 2017-06-06 ASSESSMENT — ANXIETY QUESTIONNAIRES
3. WORRYING TOO MUCH ABOUT DIFFERENT THINGS: SEVERAL DAYS
2. NOT BEING ABLE TO STOP OR CONTROL WORRYING: SEVERAL DAYS
1. FEELING NERVOUS, ANXIOUS, OR ON EDGE: SEVERAL DAYS
7. FEELING AFRAID AS IF SOMETHING AWFUL MIGHT HAPPEN: NOT AT ALL
GAD7 TOTAL SCORE: 6
IF YOU CHECKED OFF ANY PROBLEMS ON THIS QUESTIONNAIRE, HOW DIFFICULT HAVE THESE PROBLEMS MADE IT FOR YOU TO DO YOUR WORK, TAKE CARE OF THINGS AT HOME, OR GET ALONG WITH OTHER PEOPLE: NOT DIFFICULT AT ALL
6. BECOMING EASILY ANNOYED OR IRRITABLE: SEVERAL DAYS
5. BEING SO RESTLESS THAT IT IS HARD TO SIT STILL: SEVERAL DAYS

## 2017-06-06 ASSESSMENT — PATIENT HEALTH QUESTIONNAIRE - PHQ9: 5. POOR APPETITE OR OVEREATING: SEVERAL DAYS

## 2017-06-06 ASSESSMENT — PAIN SCALES - GENERAL: PAINLEVEL: NO PAIN (1)

## 2017-06-06 NOTE — NURSING NOTE
"Chief Complaint   Patient presents with     Back Pain     Bends over and has pain, low kidney area. Works at [employer] in town. Thinks its muscle related. Symptoms for 1-2 weeks.        Initial /70  Pulse 84  Temp 98.5  F (36.9  C) (Temporal)  Resp 18  Ht 5' 9\" (1.753 m)  Wt 132 lb (59.9 kg)  SpO2 100%  BMI 19.49 kg/m2 Estimated body mass index is 19.49 kg/(m^2) as calculated from the following:    Height as of this encounter: 5' 9\" (1.753 m).    Weight as of this encounter: 132 lb (59.9 kg).  Medication Reconciliation: complete    "

## 2017-06-06 NOTE — PROGRESS NOTES
SUBJECTIVE:                                                    Steven Romeo is a 21 year old male who presents to clinic today for the following health issues:      Back Pain      Duration: Pain around kidney area for about 1 week, triaged by Lizzette yesterday.         Specific cause: none    Description:   Location of pain: low back  and right hip area   Character of pain:  waxing and waning  Pain radiation:none  New numbness or weakness in legs, not attributed to pain:  no     Intensity: Currently 1/10    History:   Pain interferes with job: YES, with bending and lifting, moving certain ways.   History of back problems: no prior back problems  Any previous MRI or X-rays: None  Sees a specialist for back pain:  No  Therapies tried without relief: acetaminophen (Tylenol) and rest    Alleviating factors:   Improved by: acetaminophen (Tylenol)      Precipitating factors:  Worsened by: Bending, standing for long time.               Problem list and histories reviewed & adjusted, as indicated.  Additional history: as documented    Reviewed and updated as needed this visit by clinical staff  Tobacco  Allergies  Meds  Problems  Med Hx  Surg Hx  Fam Hx  Soc Hx        Reviewed and updated as needed this visit by Provider  Allergies  Meds  Problems         Right sided low back pain, just above the hip.  No specific injury associated with the pain.  Started more than a week ago, not more than a couple of weeks ago.  Slowly progressing in severity.  Today, however, it is 1/10.  Certain days are worse than others.  Movement makes it worse.  Rest makes pain better.  Laying improves pain.  Standing is neutral, bending makes it worse.  Twisting even worse.    Drinking plenty of water.  Sweats most of it out.  Feeling more tired.      No previous injuries, no past motor vehicle accident.  Patient reports no abdominal pain, change in bowel/bladder function, or saddle anesthesia.  No problems with urination.   He has not  "sought any other care for his back prior to today.    Had chemo/radiation for germinal cell brain cancer in 2015.  Currently in remission.  History of spontaneous pneumothorax.  Had this surgically repaired.  History of depression that is in remission.  No current symptoms.    ROS:  Negative for fevers, chills, nausea, or vomiting     OBJECTIVE:                                                    /70  Pulse 84  Temp 98.5  F (36.9  C) (Temporal)  Resp 18  Ht 5' 9\" (1.753 m)  Wt 132 lb (59.9 kg)  SpO2 100%  BMI 19.49 kg/m2  Body mass index is 19.49 kg/(m^2).  GENERAL APPEARANCE: healthy, alert and no distress  RESP: lungs clear to auscultation - no rales, rhonchi or wheezes  CV: regular rates and rhythm, normal S1 S2, no S3 or S4 and no murmur, click or rub  MS: Lumbar back exam:   Patient's spine appears well aligned.  Patient is nontender to palpation over the spinous processes and tender over the paraspinal muscles.  Patient has full flexion with some pain and full extension without pain.  Side bends are full without pain.  Patient is able to walk on patient's heels and toes.  Flexion and extension of the lower extremities is 5/5 bilaterally.  Reflexes normal and symmetric.  Straight leg raise is negative and does correlate with seated straight leg raise           ASSESSMENT/PLAN:                                                        ICD-10-CM    1. Acute right-sided low back pain without sciatica M54.5 Acetaminophen (TYLENOL PO)     cyclobenzaprine (FLEXERIL) 10 MG tablet   2. Depression, major, in remission (H) F32.5 DEPRESSION ACTION PLAN (DAP)     PLAN:  1.  Referral for chiropractic care for either Dr Berny Pena, Dr. Ayaan Quach or Dr. Marco Pena.  Contact information given to patient.  He may not go right away, but he has the info now in the event that pain does not improve in next few days or if it worsens.  If this does not work and pain persists, we could consider physical therapy.  2.  " Flexeril prescription to help him sleep at night and hopefully reduce some of his back spasms.      Follow up with Provider - as needed      Eugene Flores MD   [facility]

## 2017-06-06 NOTE — MR AVS SNAPSHOT
"              After Visit Summary   6/6/2017    Steven Romeo    MRN: [id number]           Patient Information     Date Of Birth          1995        Visit Information        Provider Department      6/6/2017 8:30 AM Eugene Flores MD [facility]        Today's Diagnoses     Acute right-sided low back pain without sciatica    -  1    Depression, major, in remission (H)           Follow-ups after your visit        Who to contact     If you have questions or need follow up information about today's clinic visit or your schedule please contact [facility] directly at [phone].  Normal or non-critical lab and imaging results will be communicated to you by MyChart, letter or phone within 4 business days after the clinic has received the results. If you do not hear from us within 7 days, please contact the clinic through [employer] or phone. If you have a critical or abnormal lab result, we will notify you by phone as soon as possible.  Submit refill requests through [employer] or call your pharmacy and they will forward the refill request to us. Please allow 3 business days for your refill to be completed.          Additional Information About Your Visit        MyChart Information     [employer] gives you secure access to your electronic health record. If you see a primary care provider, you can also send messages to your care team and make appointments. If you have questions, please call your primary care clinic.  If you do not have a primary care provider, please call [phone] and they will assist you.        Care EveryWhere ID     This is your Care EveryWhere ID. This could be used by other organizations to access your [facility] medical records  [id number]        Your Vitals Were     Pulse Temperature Respirations Height Pulse Oximetry BMI (Body Mass Index)    84 98.5  F (36.9  C) (Temporal) 18 5' 9\" (1.753 m) 100% 19.49 kg/m2       Blood Pressure from Last 3 Encounters: "   06/06/17 116/70   02/24/17 114/64   02/21/17 122/70    Weight from Last 3 Encounters:   06/06/17 132 lb (59.9 kg)   02/24/17 126 lb 11.2 oz (57.5 kg)   02/21/17 135 lb (61.2 kg)              We Performed the Following     DEPRESSION ACTION PLAN (DAP)          Today's Medication Changes          These changes are accurate as of: 6/6/17  3:50 PM.  If you have any questions, ask your nurse or doctor.               Start taking these medicines.        Dose/Directions    cyclobenzaprine 10 MG tablet   Commonly known as:  FLEXERIL   Used for:  Acute right-sided low back pain without sciatica   Started by:  Eugene Flores MD        Dose:  10 mg   Take 1 tablet (10 mg) by mouth 3 times daily as needed for muscle spasms   Quantity:  30 tablet   Refills:  0         Stop taking these medicines if you haven't already. Please contact your care team if you have questions.     naproxen 500 MG tablet   Commonly known as:  NAPROSYN   Stopped by:  Eugene Flores MD           ondansetron 4 MG ODT tab   Commonly known as:  ZOFRAN-ODT   Stopped by:  Eugene Flores MD                Where to get your medicines      These medications were sent to [facility] [address] [facility] [address] [facility] [address]     Phone:  [phone]     cyclobenzaprine 10 MG tablet                Primary Care Provider Office Phone # Fax #    Rita Krause PA-C [phone]       [place] [address]   [facility] [address]        Thank you!     Thank you for choosing [facility]  for your care. Our goal is always to provide you with excellent care. Hearing back from our patients is one way we can continue to improve our services. Please take a few minutes to complete the written survey that you may receive in the mail after your visit with us. Thank you!             Your Updated Medication List - Protect others around you: Learn how to safely  use, store and throw away your medicines at www.disposemymeds.org.          This list is accurate as of: 6/6/17  3:50 PM.  Always use your most recent med list.                   Brand Name Dispense Instructions for use    cyclobenzaprine 10 MG tablet    FLEXERIL    30 tablet    Take 1 tablet (10 mg) by mouth 3 times daily as needed for muscle spasms       TYLENOL PO      Take 500 mg by mouth every 4 hours as needed for mild pain or fever

## 2017-06-07 ASSESSMENT — PATIENT HEALTH QUESTIONNAIRE - PHQ9: SUM OF ALL RESPONSES TO PHQ QUESTIONS 1-9: 4

## 2017-06-07 ASSESSMENT — ANXIETY QUESTIONNAIRES: GAD7 TOTAL SCORE: 6

## 2017-06-10 ENCOUNTER — HOSPITAL ENCOUNTER (EMERGENCY)
Facility: CLINIC | Age: 22
Discharge: HOME | End: 2017-06-10
Attending: EMERGENCY MEDICINE | Admitting: EMERGENCY MEDICINE
Payer: COMMERCIAL

## 2017-06-10 ENCOUNTER — APPOINTMENT (OUTPATIENT)
Dept: GENERAL RADIOLOGY | Facility: CLINIC | Age: 22
End: 2017-06-10
Attending: EMERGENCY MEDICINE
Payer: COMMERCIAL

## 2017-06-10 VITALS
WEIGHT: 135 LBS | HEART RATE: 62 BPM | HEIGHT: 69 IN | SYSTOLIC BLOOD PRESSURE: 119 MMHG | TEMPERATURE: 97.3 F | OXYGEN SATURATION: 100 % | RESPIRATION RATE: 18 BRPM | BODY MASS INDEX: 19.99 KG/M2 | DIASTOLIC BLOOD PRESSURE: 67 MMHG

## 2017-06-10 DIAGNOSIS — J20.9 ACUTE BRONCHITIS, UNSPECIFIED ORGANISM: ICD-10-CM

## 2017-06-10 DIAGNOSIS — R07.0 THROAT PAIN: ICD-10-CM

## 2017-06-10 DIAGNOSIS — R07.0 PAIN IN THROAT: ICD-10-CM

## 2017-06-10 DIAGNOSIS — Z87.891 PERSONAL HISTORY OF TOBACCO USE, PRESENTING HAZARDS TO HEALTH: ICD-10-CM

## 2017-06-10 LAB
DEPRECATED S PYO AG THROAT QL EIA: NORMAL
GRAM STN SPEC: ABNORMAL
MICRO REPORT STATUS: ABNORMAL
MICRO REPORT STATUS: NORMAL
SPECIMEN SOURCE: ABNORMAL
SPECIMEN SOURCE: NORMAL

## 2017-06-10 PROCEDURE — 99284 EMERGENCY DEPT VISIT MOD MDM: CPT | Mod: Z6 | Performed by: EMERGENCY MEDICINE

## 2017-06-10 PROCEDURE — 87081 CULTURE SCREEN ONLY: CPT | Performed by: EMERGENCY MEDICINE

## 2017-06-10 PROCEDURE — 87070 CULTURE OTHR SPECIMN AEROBIC: CPT | Performed by: EMERGENCY MEDICINE

## 2017-06-10 PROCEDURE — 87880 STREP A ASSAY W/OPTIC: CPT | Performed by: EMERGENCY MEDICINE

## 2017-06-10 PROCEDURE — 71020 XR CHEST 2 VW: CPT | Mod: TC

## 2017-06-10 PROCEDURE — 87205 SMEAR GRAM STAIN: CPT | Performed by: EMERGENCY MEDICINE

## 2017-06-10 PROCEDURE — 99284 EMERGENCY DEPT VISIT MOD MDM: CPT | Mod: 25 | Performed by: EMERGENCY MEDICINE

## 2017-06-10 RX ORDER — NICOTINE 21 MG/24HR
1 PATCH, TRANSDERMAL 24 HOURS TRANSDERMAL EVERY 24 HOURS
Qty: 30 PATCH | Refills: 0 | Status: SHIPPED | OUTPATIENT
Start: 2017-06-10 | End: 2017-08-11

## 2017-06-10 RX ORDER — GUAIFENESIN 600 MG/1
600 TABLET, EXTENDED RELEASE ORAL 2 TIMES DAILY PRN
Qty: 20 TABLET | Refills: 0 | Status: SHIPPED | OUTPATIENT
Start: 2017-06-10 | End: 2017-08-11

## 2017-06-10 RX ORDER — AZITHROMYCIN 250 MG/1
TABLET, FILM COATED ORAL
Qty: 6 TABLET | Refills: 0 | Status: SHIPPED | OUTPATIENT
Start: 2017-06-10 | End: 2017-06-15

## 2017-06-10 NOTE — ED AVS SNAPSHOT
[facility] Emergency Department    [address] [place] [address]    Phone:  [phone]    Fax:  [phone]                                       Steven Romeo   MRN: [id number]    Department:  [facility] Department   Date of Visit:  6/10/2017           After Visit Summary Signature Page     I have received my discharge instructions, and my questions have been answered. I have discussed any challenges I see with this plan with the nurse or doctor.    ..........................................................................................................................................  Patient/Patient Representative Signature      ..........................................................................................................................................  Patient Representative Print Name and Relationship to Patient    ..................................................               ................................................  Date                                            Time    ..........................................................................................................................................  Reviewed by Signature/Title    ...................................................              ..............................................  Date                                                            Time

## 2017-06-10 NOTE — DISCHARGE INSTRUCTIONS
Drink lots of fluids and get plenty of rest.    This is the perfect time to quit smoking. You can try the NicoDerm patches.    Mucinex/guaifenesin as needed for congestion and cough.    Okay to use Tylenol or ibuprofen if needed for pain.    If you're not improving over the next couple of days, okay to start antibiotics.    Return at any time for worsening, changes or concerns.    I hope you feel MUCH better quickly!!
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
## 2017-06-10 NOTE — LETTER
[facility] EMERGENCY DEPARTMENT  [address]   [place] [address]  [phone]    Michelle 10, 2017    Steven Romeo  [address] [facility] [address]  [phone] (home) none (work)    : 1995      To Whom it may concern:    Steven Romeo was seen in our Emergency Department today, Michelle 10, 2017.  I expect his condition to improve over the next few.  He may return to work/school when improved.    Sincerely,        Gely Michaels MD

## 2017-06-10 NOTE — ED NOTES
Pt w/hx of brain cancer in remission.  Yesterday he felt fine.  It was very hot/muggy and he completed his work shift.  Went home fatigued and vomited possibly r/t fryer smells.  He woke this am w/cough w/yellow phelgm and more fatigue.  States he just does not feel well.  1/2 ppd smoker.     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

## 2017-06-12 ENCOUNTER — TELEPHONE (OUTPATIENT)
Dept: FAMILY MEDICINE | Facility: CLINIC | Age: 22
End: 2017-06-12

## 2017-06-12 LAB
BACTERIA SPEC CULT: NORMAL
BACTERIA SPEC CULT: NORMAL
MICRO REPORT STATUS: NORMAL
MICRO REPORT STATUS: NORMAL
SPECIMEN SOURCE: NORMAL
SPECIMEN SOURCE: NORMAL

## 2017-06-12 NOTE — LETTER
[address] [address]  Phone: [phone]  Fax: [phone]    June 13, 2017        Steven Romeo  [address] [facility] [address]          To whom it may concern:    RE: Steven Romeo    Patient was seen and treated at our clinic. Due to illness please excuse patient from work 6/13/17, 6/14/17, and 6/15/17.     Please contact me for questions or concerns.        Sincerely,    Rita Krause PA-C         [place] [address] [facility] [address]  Phone: [phone]  Fax: [phone]

## 2017-06-12 NOTE — TELEPHONE ENCOUNTER
Marcelo Sherwood' mother is calling and requesting to speak with Rita or her team regarding this. Please advise

## 2017-06-12 NOTE — TELEPHONE ENCOUNTER
I saw this message at 6:01 PM. Computers were down all day. I am fine with writing this letter-please go ahead and do this in coordinate getting it to the family.    Electronically signed by Rita Krause PA-C  6/12/2017

## 2017-06-12 NOTE — TELEPHONE ENCOUNTER
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  On meds for 5 days, 2 days remaining.  Please advise, patient was advised that Rita is not in until noon today.    Phone Number Patient can be reached at: Other phone number:  [phone]  Mom (ok to talk to mom, verbal consent)    Best Time:     Can we leave a detailed message on this number? YES    Call taken on 6/12/2017 at 7:12 AM by Deepthi Maharaj

## 2017-06-13 ENCOUNTER — TELEPHONE (OUTPATIENT)
Dept: FAMILY MEDICINE | Facility: CLINIC | Age: 22
End: 2017-06-13

## 2017-06-13 NOTE — TELEPHONE ENCOUNTER
I spoke with patient and he stated that he is gradually feeling better. 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  Kathia Sweeney CMA ([facility])

## 2017-06-13 NOTE — TELEPHONE ENCOUNTER
Reason for Call:  Same Day Appointment, Requested Provider:  Rita Krause PA-C    PCP: Rita Krause    Reason for visit: fu ER bronchitis, needs to fu with pcp sometime this week    Duration of symptoms: 1 1/2 weeks     Have you been treated for this in the past? Yes    Additional comments: pt was seen in the ER on 6/10/2017 needs to follow up with pcp     Can we leave a detailed message on this number? YES    Phone number patient can be reached at: Home number on file [phone] (home)    Best Time: anytime     Call taken on 6/13/2017 at 10:38 AM by Cassie Lewis

## 2017-06-14 NOTE — TELEPHONE ENCOUNTER
Patient scheduled appt with another Provider.    Thank you,   Lakesha Jackson   for [facility]

## 2017-06-14 NOTE — TELEPHONE ENCOUNTER
We do not have any other openings. Please offer another provider.  Kathia Sweeney CMA ([facility])

## 2017-06-14 NOTE — TELEPHONE ENCOUNTER
He should wait until early next week to be seen if he needs a clearance for work. Put him in for a 15 minute visit- if I don't have room he can see one of my partners.  Electronically signed by Rita Krause PA-C  6/14/2017

## 2017-06-14 NOTE — TELEPHONE ENCOUNTER
Please call patient and inform him that yes he will need to be seen to be cleared to go back to work. We will want to wait until next week for this. We can see patient 6/19 at 4:30 pm. Appt scheduled.  Kathia Sweeney CMA ([facility])

## 2017-06-14 NOTE — TELEPHONE ENCOUNTER
Called patient. Relayed message below to patient. Patient is asking if he can be seen before that, because he doesn't want to miss anymore work. Please advise.      # [phone] OKAY TO JAVIER    Thank you,   Lakesha Jackson   for [facility]

## 2017-06-16 ENCOUNTER — OFFICE VISIT (OUTPATIENT)
Dept: FAMILY MEDICINE | Facility: CLINIC | Age: 22
End: 2017-06-16
Payer: COMMERCIAL

## 2017-06-16 VITALS
TEMPERATURE: 97 F | WEIGHT: 135 LBS | HEART RATE: 70 BPM | SYSTOLIC BLOOD PRESSURE: 120 MMHG | DIASTOLIC BLOOD PRESSURE: 62 MMHG | OXYGEN SATURATION: 99 % | BODY MASS INDEX: 19.94 KG/M2

## 2017-06-16 DIAGNOSIS — J20.9 ACUTE BRONCHITIS, UNSPECIFIED ORGANISM: Primary | ICD-10-CM

## 2017-06-16 PROCEDURE — 99213 OFFICE O/P EST LOW 20 MIN: CPT | Performed by: NURSE PRACTITIONER

## 2017-06-16 NOTE — NURSING NOTE
"Chief Complaint   Patient presents with     RECHECK     bronchitis       Initial There were no vitals taken for this visit. Estimated body mass index is 19.94 kg/(m^2) as calculated from the following:    Height as of 6/10/17: 5' 9\" (1.753 m).    Weight as of 6/10/17: 135 lb (61.2 kg).  Medication Reconciliation: complete  "
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
## 2017-06-16 NOTE — MR AVS SNAPSHOT
After Visit Summary   6/16/2017    Steven Romeo    MRN: [id number]           Patient Information     Date Of Birth          1995        Visit Information        Provider Department      6/16/2017 8:00 AM Vaishali Ernandez APRN CNP [facility]        Today's Diagnoses     Acute bronchitis, unspecified organism    -  1       Follow-ups after your visit        Who to contact     If you have questions or need follow up information about today's clinic visit or your schedule please contact [facility] directly at [phone].  Normal or non-critical lab and imaging results will be communicated to you by [facility], letter or phone within 4 business days after the clinic has received the results. If you do not hear from us within 7 days, please contact the clinic through [employer] or phone. If you have a critical or abnormal lab result, we will notify you by phone as soon as possible.  Submit refill requests through [facility] or call your pharmacy and they will forward the refill request to us. Please allow 3 business days for your refill to be completed.          Additional Information About Your Visit        MyChart Information     [facility] gives you secure access to your electronic health record. If you see a primary care provider, you can also send messages to your care team and make appointments. If you have questions, please call your primary care clinic.  If you do not have a primary care provider, please call [phone] and they will assist you.        Care EveryWhere ID     This is your Care EveryWhere ID. This could be used by other organizations to access your [place] medical records  [id number]        Your Vitals Were     Pulse Temperature Pulse Oximetry BMI (Body Mass Index)          70 97  F (36.1  C) (Tympanic) 99% 19.94 kg/m2         Blood Pressure from Last 3 Encounters:   06/16/17 120/62   06/10/17 119/67   06/06/17 116/70    Weight from Last 3  "Encounters:   06/16/17 135 lb (61.2 kg)   06/10/17 135 lb (61.2 kg)   06/06/17 132 lb (59.9 kg)              Today, you had the following     No orders found for display       Primary Care Provider Office Phone # Fax #    Rita Krause PA-C [phone]       [address] [place] [address]        Thank you!     Thank you for choosing [facility]  for your care. Our goal is always to provide you with excellent care. Hearing back from our patients is one way we can continue to improve our services. Please take a few minutes to complete the written survey that you may receive in the mail after your visit with us. Thank you!             Your Updated Medication List - Protect others around you: Learn how to safely use, store and throw away your medicines at www.disposemymeds.org.          This list is accurate as of: 6/16/17  8:37 AM.  Always use your most recent med list.                   Brand Name Dispense Instructions for use    cyclobenzaprine 10 MG tablet    FLEXERIL    30 tablet    Take 1 tablet (10 mg) by mouth 3 times daily as needed for muscle spasms       guaiFENesin 600 MG 12 hr tablet    MUCINEX    20 tablet    Take 1 tablet (600 mg) by mouth 2 times daily as needed (chest congestion and cough (\"Mucinex\"))       nicotine 14 MG/24HR 24 hr patch    NICODERM CQ    30 patch    Place 1 patch onto the skin every 24 hours       TYLENOL PO      Take 500 mg by mouth every 4 hours as needed for mild pain or fever         "

## 2017-06-16 NOTE — LETTER
[address] [address]  Phone: [phone]  Fax: [phone]    June 16, 2017        Steevn Romeo  [address] [facility] [address]          To whom it may concern:    RE: Steven Romeo    Patient was seen and treated today at our clinic and has missed work.  Patient may return to work 6/17 with the following:  No restrictions    Please contact me for questions or concerns.      Sincerely,        EFRAÍN Lord CNP

## 2017-07-27 DIAGNOSIS — C80.1 GERM CELL TUMOR (H): Primary | ICD-10-CM

## 2017-08-08 DIAGNOSIS — H53.10 SUBJECTIVE VISUAL DISTURBANCE: Primary | ICD-10-CM

## 2017-08-09 ENCOUNTER — TELEPHONE (OUTPATIENT)
Dept: PEDIATRIC HEMATOLOGY/ONCOLOGY | Facility: CLINIC | Age: 22
End: 2017-08-09

## 2017-08-09 NOTE — TELEPHONE ENCOUNTER
ROSELYN received phone call from Presleyparrish Romeo, Steven's mother requesting a referral to [facility] be made for Steven's upcoming day of appointments on September 13th. ROSELYN returned call to Presley and clarified their arrival date, September 12th, with check-out being September 13th. [employer] application completed by ROSELYN and faxed to [employer] (P: [phone]; F: [phone]). [facility] will contact MomPresley to finalize details. No further needs identified. Social work will continue to assess needs and provide ongoing psychosocial support and access to resources.      LETA Padron, [facility]  Clinical    Pediatric Hematology Oncology   [facility]   [phone]    NO LETTER

## 2017-08-11 ENCOUNTER — OFFICE VISIT (OUTPATIENT)
Dept: OPHTHALMOLOGY | Facility: CLINIC | Age: 22
End: 2017-08-11
Attending: OPHTHALMOLOGY
Payer: COMMERCIAL

## 2017-08-11 DIAGNOSIS — H53.2 DIPLOPIA: ICD-10-CM

## 2017-08-11 DIAGNOSIS — D44.5 PINEAL GERM CELL TUMOR (H): Primary | ICD-10-CM

## 2017-08-11 DIAGNOSIS — H53.10 SUBJECTIVE VISUAL DISTURBANCE: ICD-10-CM

## 2017-08-11 PROCEDURE — 99215 OFFICE O/P EST HI 40 MIN: CPT | Mod: 25,ZF

## 2017-08-11 ASSESSMENT — VISUAL ACUITY
OS_SC: 20/20
METHOD: SNELLEN - LINEAR
OD_SC: 20/20

## 2017-08-11 ASSESSMENT — TONOMETRY
OD_IOP_MMHG: 22
IOP_METHOD: TONOPEN
OS_IOP_MMHG: 22

## 2017-08-11 ASSESSMENT — CUP TO DISC RATIO
OD_RATIO: 0.25
OS_RATIO: 0.35

## 2017-08-11 ASSESSMENT — EXTERNAL EXAM - RIGHT EYE: OD_EXAM: NORMAL

## 2017-08-11 ASSESSMENT — EXTERNAL EXAM - LEFT EYE: OS_EXAM: NORMAL

## 2017-08-11 ASSESSMENT — SLIT LAMP EXAM - LIDS
COMMENTS: NORMAL
COMMENTS: NORMAL

## 2017-08-11 NOTE — LETTER
2017         RE:  :  MRN: Steven Romeo  1995  [id number]     Dear Dr. Boyd,    Your patient, Steven Romeo, returned for neuro-ophthalmic follow up. My assessment and plan are below.  For further details, please see my attached clinic note.          Assessment & Plan     Steven Romeo is a 22 year old male with the following diagnoses:   1. 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 Patient with diplopia at far left and right gaze.     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  There is no evidence of papilledema.   He has mild sluggishness to his pupils with light near dissocation.  This is all stable.  Follow up 1-2 years.      Again, thank you for allowing me to participate in the care of your patient.      Sincerely,    Dallas Mary MD  Professor, Neuro-Ophthalmology  Department of Ophthalmology and Visual Neurosciences  [facility]      CC: Vasile Boyd MD  [address] [facility] [id number]  [facility] [address]  VIA In Basket     Rita Krause PA-C  [address] [place] [address]  VIA In Basket     Erica Luna RN  VIA In Basket     Luis Alberto Melo MD  [address] [facility] [address]  VIA In Basket     Pamella Hale MD  [address] [facility] [address]  [facility] [address]  VIA In Basket     Briana Schwab, RN  VIA In Basket     Dilcia Ziegler, PhD   [address] [facility] [address]  VIA In Basket     EFRAÍN Vega CNP  [address] [facility] [address]  VIA In Basket

## 2017-08-11 NOTE — PROGRESS NOTES
Assessment & Plan     Steven Romeo is a 22 year old male with the following diagnoses:   1. 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 Patient with diplopia at far left and right gaze.     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  There is no evidence of papilledema.   He has mild sluggishness to his pupils with light near dissocation.  This is all stable.  Follow up 1-2 years.           Attending Physician Attestation:  Complete documentation of historical and exam elements from today's encounter can be found in the full encounter summary report (not reduplicated in this progress note).  I personally obtained the chief complaint(s) and history of present illness.  I confirmed and edited as necessary the review of systems, past medical/surgical history, family history, social history, and examination findings as documented by others; and I examined the patient myself.  I personally reviewed the relevant tests, images, and reports as documented above.  I formulated and edited as necessary the assessment and plan and discussed the findings and management plan with the patient and family. - Dallas Mary MD

## 2017-08-11 NOTE — NURSING NOTE
Chief Complaints and History of Present Illnesses   Patient presents with     Neurologic Problem     FU     HPI    Symptoms:              Comments:  Steven Romeo is a 19 year old male with the following diagnoses:   1. Bilateral 6th nerve palsies.   2. Papilledema associated with increased intracranial pressure     No significant change or improvement since the last visit.   Diplopic on extreme side gaze.   No headaches  No tinnitus.     Sima PFEIFFER 9:10 AM August 11, 2017

## 2017-08-11 NOTE — MR AVS SNAPSHOT
After Visit Summary   8/11/2017    Steven Romeo    MRN: [id number]           Patient Information     Date Of Birth          1995        Visit Information        Provider Department      8/11/2017 9:00 AM [facility]        Today's Diagnoses     Pineal germ cell tumor (H)    -  1    Subjective visual disturbance        Diplopia           Follow-ups after your visit        Your next 10 appointments already scheduled     Sep 13, 2017  8:00 AM CDT   (Arrive by 7:45 AM)   Neuropsych Eval with Gael Jacobs, PhD   [facility] ([facility])    [address] [id number] [phone]            Sep 13, 2017 12:00 PM CDT   MR BRAIN W/O CONTRAST with URMR2   [facility] [place] MRI ([facility])    [address] [facility] [address] [phone]           Take your medicines as usual, unless your doctor tells you not to. Bring a list of your current medicines to your exam (including vitamins, minerals and over-the-counter drugs). Also bring the results of similar scans you may have had.  Please remove any body piercings and hair extensions before you arrive.  Follow your doctor s orders. If you do not, we may have to postpone your exam.  You will not have contrast for this exam. You do not need to do anything special to prepare.  The MRI machine uses a strong magnet. Please wear clothes without metal (snaps, zippers). A sweatsuit works well, or we may give you a hospital gown.   **IMPORTANT** THE INSTRUCTIONS BELOW ARE ONLY FOR THOSE PATIENTS WHO HAVE BEEN TOLD THEY WILL RECEIVE SEDATION OR GENERAL ANESTHESIA DURING THEIR MRI PROCEDURE:  IF YOU WILL RECEIVE SEDATION (take medicine to help you relax during your exam):   You must get the medicine from your doctor before you arrive. Bring the medicine to the exam. Do not take it at home.   Arrive one  hour early. Bring someone who can take you home after the test. Your medicine will make you sleepy. After the exam, you may not drive, take a bus or take a taxi by yourself.   No eating 8 hours before your exam. You may have clear liquids up until 4 hours before your exam. (Clear liquids include water, clear tea, black coffee and fruit juice without pulp.)  IF YOU WILL RECEIVE ANESTHESIA (be asleep for your exam):   Arrive 1 1/2 hours early. Bring someone who can take you home after the test. You may not drive, take a bus or take a taxi by yourself.   No eating 8 hours before your exam. You may have clear liquids up until 4 hours before your exam. (Clear liquids include water, clear tea, black coffee and fruit juice without pulp.)   You will spend four to five hours in the recovery room.  Please call the Imaging Department at your exam site with any questions.            Sep 13, 2017  2:00 PM CDT   Return Visit with Luis Alberto Melo MD   Peds Hematology Oncology ([facility])    [facility]  9th Floor  [address] [facility] [id number] [phone]              Who to contact     Please call your clinic at [phone] to:    Ask questions about your health    Make or cancel appointments    Discuss your medicines    Learn about your test results    Speak to your doctor   If you have compliments or concerns about an experience at your clinic, or if you wish to file a complaint, please contact [facility] Patient Relations at [phone] or email us at [email or web address]         Additional Information About Your Visit        MyChart Information     [employer] gives you secure access to your electronic health record. If you see a primary care provider, you can also send messages to your care team and make appointments. If you have questions, please call your primary care clinic.  If you do not have a primary care provider, please call  [phone] and they will assist you.      [employer] is an electronic gateway that provides easy, online access to your medical records. With [employer], you can request a clinic appointment, read your test results, renew a prescription or communicate with your care team.     To access your existing account, please contact your [facility] or call [phone] for assistance.        Care EveryWhere ID     This is your Care EveryWhere ID. This could be used by other organizations to access your [place] medical records  [id number]         Blood Pressure from Last 3 Encounters:   06/16/17 120/62   06/10/17 119/67   06/06/17 116/70    Weight from Last 3 Encounters:   06/16/17 61.2 kg (135 lb)   06/10/17 61.2 kg (135 lb)   06/06/17 59.9 kg (132 lb)              We Performed the Following     DILATED FUNDUS EXAM     IOP Measurement          Today's Medication Changes          These changes are accurate as of: 8/11/17 12:58 PM.  If you have any questions, ask your nurse or doctor.               Stop taking these medicines if you haven't already. Please contact your care team if you have questions.     cyclobenzaprine 10 MG tablet   Commonly known as:  FLEXERIL           guaiFENesin 600 MG 12 hr tablet   Commonly known as:  MUCINEX           nicotine 14 MG/24HR 24 hr patch   Commonly known as:  NICODERM CQ           TYLENOL PO                    Primary Care Provider Office Phone # Fax #    Rita Krause PA-C [phone]       [address] [place] [address]        Equal Access to Services     GEMMA JOHNS AH: Hadii aad ku hadasho Soomaali, waaxda luqadaha, qaybta kaalmada adeegyada, waxay sonin hayemersonn aylin jamison. So [facility] [phone].    ATENCIÓN: Si habla español, tiene a garcia disposición servicios gratuitos de asistencia lingüística. Llame al [phone].    We comply with applicable federal civil rights laws and [place] laws. We do not discriminate on the  basis of race, color, national origin, age, disability sex, sexual orientation or gender identity.            Thank you!     Thank you for choosing [facility]  for your care. Our goal is always to provide you with excellent care. Hearing back from our patients is one way we can continue to improve our services. Please take a few minutes to complete the written survey that you may receive in the mail after your visit with us. Thank you!             Your Updated Medication List - Protect others around you: Learn how to safely use, store and throw away your medicines at www.disposemymeds.org.      Notice  As of 8/11/2017 12:58 PM    You have not been prescribed any medications.

## 2017-08-21 ENCOUNTER — OFFICE VISIT (OUTPATIENT)
Dept: FAMILY MEDICINE | Facility: CLINIC | Age: 22
End: 2017-08-21
Payer: COMMERCIAL

## 2017-08-21 VITALS
DIASTOLIC BLOOD PRESSURE: 58 MMHG | SYSTOLIC BLOOD PRESSURE: 112 MMHG | RESPIRATION RATE: 18 BRPM | BODY MASS INDEX: 19.55 KG/M2 | HEART RATE: 80 BPM | WEIGHT: 132 LBS | TEMPERATURE: 98.9 F | HEIGHT: 69 IN | OXYGEN SATURATION: 97 %

## 2017-08-21 DIAGNOSIS — J06.9 VIRAL URI: Primary | ICD-10-CM

## 2017-08-21 PROCEDURE — 99213 OFFICE O/P EST LOW 20 MIN: CPT | Performed by: FAMILY MEDICINE

## 2017-08-21 ASSESSMENT — PAIN SCALES - GENERAL: PAINLEVEL: NO PAIN (0)

## 2017-08-21 NOTE — PATIENT INSTRUCTIONS
1.  Saline sinus rinse (one form comes in a squirt bottle form and the another kind is also known as Neti Pots).  Follow directions on package.  May do this 1-2 times per day.  2.  May also use Afrin (oxymetazoline) nasal spray for a maximum of 3 days for symptom control.  Do not use for longer than this.  A good idea is to use this medication with saline nasal irrigation.  If you choose to do this, use the Afrin about 30-45 minutes after the sinus rinse to maximize effect of medication.    3.  Sudafed (psudoephedrine) per package directions.  This is the medication that has to be obtained from behind the pharmacist's counter  4.  Antihistamines:  Daytime:  Claritin (loratadine) or zyrtec (cetirizine).  Nighttime:  Benadryl (diphenhydramine).  5.  Tylenol (acetaminophen) or Advil (ibuprofen) as needed for pain and/or fever.

## 2017-08-21 NOTE — NURSING NOTE
"Chief Complaint   Patient presents with     Sinus Problem     chills, sweats, fatigue       Initial /58  Pulse 80  Temp 98.9  F (37.2  C) (Tympanic)  Resp 18  Ht 5' 9\" (1.753 m)  Wt 132 lb (59.9 kg)  SpO2 97%  BMI 19.49 kg/m2 Estimated body mass index is 19.49 kg/(m^2) as calculated from the following:    Height as of this encounter: 5' 9\" (1.753 m).    Weight as of this encounter: 132 lb (59.9 kg).  Medication Reconciliation: complete    "

## 2017-08-21 NOTE — PROGRESS NOTES
"  SUBJECTIVE:   Steven Romeo is a 22 year old male who presents to clinic today for the following health issues:      Acute Illness   Acute illness concerns: cough, sweats, chills   Onset: 1 week    Fever: no     Chills/Sweats: YES    Headache (location?): YES    Sinus Pressure:YES    Conjunctivitis:  no    Ear Pain: no    Rhinorrhea: no     Congestion: YES    Sore Throat: no      Cough: YES-productive of yellow sputum    Wheeze: no     Decreased Appetite: YES    Nausea: no     Vomiting: no     Diarrhea:  no     Dysuria/Freq.: no     Fatigue/Achiness: YES    Sick/Strep Exposure: no      Therapies Tried and outcome: muccinex, vishal-seltzer.         Problem list and histories reviewed & adjusted, as indicated.  Additional history: as documented    Reviewed and updated as needed this visit by clinical staffTobacco  Allergies  Meds  Problems  Soc Hx      Reviewed and updated as needed this visit by Provider  Allergies  Meds  Problems         Symptoms as above.  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      ROS:  10 point ROS of systems including Constitutional, Eyes, Respiratory, Cardiovascular, Gastroenterology, Genitourinary, Integumentary, Muscularskeletal, Psychiatric were all negative except for pertinent positives noted in my HPI.     OBJECTIVE:                                                    /58  Pulse 80  Temp 98.9  F (37.2  C) (Tympanic)  Resp 18  Ht 5' 9\" (1.753 m)  Wt 132 lb (59.9 kg)  SpO2 97%  BMI 19.49 kg/m2  Body mass index is 19.49 kg/(m^2).  GENERAL APPEARANCE: healthy, alert and no distress  EYES: Eyes grossly normal to inspection and conjunctivae and sclerae normal  HENT: ear canals and TM's normal, TM's pearly grey, normal light reflex bilateral, rhinorrhea clear, oral mucous membranes moist, oropharynx clear and nasal mucosa erythematous and swollen, sinuses nontender to palpation/percussion.  NECK: no adenopathy and no asymmetry, masses, or scars  RESP: " lungs clear to auscultation - no rales, rhonchi or wheezes  CV: regular rates and rhythm, normal S1 S2, no S3 or S4 and no murmur, click or rub           ASSESSMENT/PLAN:                                                        ICD-10-CM    1. Viral URI J06.9     B97.89      PLAN:  1.  See below for over the counter medication recommendations.   If symptoms persist more than 7-10 days or if symptoms worsen he contact clinic and ask to speak to triage nurse to see if antibiotic is warranted.  We discussed that he may need repeat exam based on his medical history.    Patient Instructions   1.  Saline sinus rinse (one form comes in a squirt bottle form and the another kind is also known as Neti Pots).  Follow directions on package.  May do this 1-2 times per day.  2.  May also use Afrin (oxymetazoline) nasal spray for a maximum of 3 days for symptom control.  Do not use for longer than this.  A good idea is to use this medication with saline nasal irrigation.  If you choose to do this, use the Afrin about 30-45 minutes after the sinus rinse to maximize effect of medication.    3.  Sudafed (psudoephedrine) per package directions.  This is the medication that has to be obtained from behind the pharmacist's counter  4.  Antihistamines:  Daytime:  Claritin (loratadine) or zyrtec (cetirizine).  Nighttime:  Benadryl (diphenhydramine).  5.  Tylenol (acetaminophen) or Advil (ibuprofen) as needed for pain and/or fever.          Eugene Flores MD   [facility]

## 2017-08-21 NOTE — MR AVS SNAPSHOT
After Visit Summary   8/21/2017    Steven Romeo    MRN: [id number]           Patient Information     Date Of Birth          1995        Visit Information        Provider Department      8/21/2017 4:30 PM Eugene Flores MD [facility]        Today's Diagnoses     Viral URI    -  1      Care Instructions    1.  Saline sinus rinse (one form comes in a squirt bottle form and the another kind is also known as Neti Pots).  Follow directions on package.  May do this 1-2 times per day.  2.  May also use Afrin (oxymetazoline) nasal spray for a maximum of 3 days for symptom control.  Do not use for longer than this.  A good idea is to use this medication with saline nasal irrigation.  If you choose to do this, use the Afrin about 30-45 minutes after the sinus rinse to maximize effect of medication.    3.  Sudafed (psudoephedrine) per package directions.  This is the medication that has to be obtained from behind the pharmacist's counter  4.  Antihistamines:  Daytime:  Claritin (loratadine) or zyrtec (cetirizine).  Nighttime:  Benadryl (diphenhydramine).  5.  Tylenol (acetaminophen) or Advil (ibuprofen) as needed for pain and/or fever.           Follow-ups after your visit        Your next 10 appointments already scheduled     Sep 13, 2017  8:00 AM CDT   (Arrive by 7:45 AM)   Neuropsych Eval with Gael Jacobs, PhD   [facility] ([facility] [place])    [address] [address] [phone]            Sep 13, 2017 12:00 PM CDT   MR BRAIN W/O CONTRAST with URMR2   [facility] [place], MRI ([facility])    [address] [facility] [address] [phone]           Take your medicines as usual, unless your doctor tells you not to. Bring a list of your current medicines to your exam (including vitamins, minerals and over-the-counter drugs). Also bring the results  of similar scans you may have had.  Please remove any body piercings and hair extensions before you arrive.  Follow your doctor s orders. If you do not, we may have to postpone your exam.  You will not have contrast for this exam. You do not need to do anything special to prepare.  The MRI machine uses a strong magnet. Please wear clothes without metal (snaps, zippers). A sweatsuit works well, or we may give you a hospital gown.   **IMPORTANT** THE INSTRUCTIONS BELOW ARE ONLY FOR THOSE PATIENTS WHO HAVE BEEN TOLD THEY WILL RECEIVE SEDATION OR GENERAL ANESTHESIA DURING THEIR MRI PROCEDURE:  IF YOU WILL RECEIVE SEDATION (take medicine to help you relax during your exam):   You must get the medicine from your doctor before you arrive. Bring the medicine to the exam. Do not take it at home.   Arrive one hour early. Bring someone who can take you home after the test. Your medicine will make you sleepy. After the exam, you may not drive, take a bus or take a taxi by yourself.   No eating 8 hours before your exam. You may have clear liquids up until 4 hours before your exam. (Clear liquids include water, clear tea, black coffee and fruit juice without pulp.)  IF YOU WILL RECEIVE ANESTHESIA (be asleep for your exam):   Arrive 1 1/2 hours early. Bring someone who can take you home after the test. You may not drive, take a bus or take a taxi by yourself.   No eating 8 hours before your exam. You may have clear liquids up until 4 hours before your exam. (Clear liquids include water, clear tea, black coffee and fruit juice without pulp.)   You will spend four to five hours in the recovery room.  Please call the Imaging Department at your exam site with any questions.            Sep 13, 2017  2:00 PM CDT   Return Visit with Luis Alberto Melo MD   Peds Hematology Oncology ([facility])    [facility]  9th Floor  [address] [facility] [address] [phone]              Who to contact  "    If you have questions or need follow up information about today's clinic visit or your schedule please contact [facility] directly at [phone].  Normal or non-critical lab and imaging results will be communicated to you by MyChart, letter or phone within 4 business days after the clinic has received the results. If you do not hear from us within 7 days, please contact the clinic through [employer] or phone. If you have a critical or abnormal lab result, we will notify you by phone as soon as possible.  Submit refill requests through [employer]craft or call your pharmacy and they will forward the refill request to us. Please allow 3 business days for your refill to be completed.          Additional Information About Your Visit        [email or web address] Information     [employer] gives you secure access to your electronic health record. If you see a primary care provider, you can also send messages to your care team and make appointments. If you have questions, please call your primary care clinic.  If you do not have a primary care provider, please call [phone] and they will assist you.        Care EveryWhere ID     This is your Care EveryWhere ID. This could be used by other organizations to access your [place] medical records  [id number]        Your Vitals Were     Pulse Temperature Respirations Height Pulse Oximetry BMI (Body Mass Index)    80 98.9  F (37.2  C) (Tympanic) 18 5' 9\" (1.753 m) 97% 19.49 kg/m2       Blood Pressure from Last 3 Encounters:   08/21/17 112/58   06/16/17 120/62   06/10/17 119/67    Weight from Last 3 Encounters:   08/21/17 132 lb (59.9 kg)   06/16/17 135 lb (61.2 kg)   06/10/17 135 lb (61.2 kg)              Today, you had the following     No orders found for display       Primary Care Provider Office Phone # Fax #    Rita Krause PA-C [phone] [address] [place] [address]        Equal Access to Services     GEMMA JOHNS AH: Suraj velez " Severiano, hammad rivera, krystal charbeljason barnes, adeline sonin hayaan codywanda delisaevan laeddiecydney yaquelin. So [facility] [phone].    ATENCIÓN: Si habla español, tiene a garcia disposición servicios gratuitos de asistencia lingüística. Stephany al [phone].    We comply with applicable federal civil rights laws and [place] laws. We do not discriminate on the basis of race, color, national origin, age, disability sex, sexual orientation or gender identity.            Thank you!     Thank you for choosing [facility]  for your care. Our goal is always to provide you with excellent care. Hearing back from our patients is one way we can continue to improve our services. Please take a few minutes to complete the written survey that you may receive in the mail after your visit with us. Thank you!             Your Updated Medication List - Protect others around you: Learn how to safely use, store and throw away your medicines at www.disposemymeds.org.      Notice  As of 8/21/2017  5:30 PM    You have not been prescribed any medications.

## 2017-09-13 ENCOUNTER — OFFICE VISIT (OUTPATIENT)
Dept: PEDIATRIC HEMATOLOGY/ONCOLOGY | Facility: CLINIC | Age: 22
End: 2017-09-13
Attending: PEDIATRICS
Payer: COMMERCIAL

## 2017-09-13 ENCOUNTER — OFFICE VISIT (OUTPATIENT)
Dept: NEUROPSYCHOLOGY | Facility: CLINIC | Age: 22
End: 2017-09-13

## 2017-09-13 ENCOUNTER — HOSPITAL ENCOUNTER (OUTPATIENT)
Dept: MRI IMAGING | Facility: CLINIC | Age: 22
Discharge: HOME | End: 2017-09-13
Attending: NURSE PRACTITIONER | Admitting: NURSE PRACTITIONER
Payer: COMMERCIAL

## 2017-09-13 VITALS
DIASTOLIC BLOOD PRESSURE: 83 MMHG | BODY MASS INDEX: 19.81 KG/M2 | HEIGHT: 68 IN | RESPIRATION RATE: 18 BRPM | SYSTOLIC BLOOD PRESSURE: 145 MMHG | HEART RATE: 60 BPM | OXYGEN SATURATION: 100 % | TEMPERATURE: 97.6 F | WEIGHT: 130.73 LBS

## 2017-09-13 DIAGNOSIS — F43.12 CHRONIC POST-TRAUMATIC STRESS DISORDER (PTSD): ICD-10-CM

## 2017-09-13 DIAGNOSIS — D44.5 PINEAL GERM CELL TUMOR (H): Primary | Chronic | ICD-10-CM

## 2017-09-13 DIAGNOSIS — C80.1 GERM CELL TUMOR (H): ICD-10-CM

## 2017-09-13 DIAGNOSIS — C80.1 GERM CELL TUMOR (H): Primary | ICD-10-CM

## 2017-09-13 PROCEDURE — 70551 MRI BRAIN STEM W/O DYE: CPT

## 2017-09-13 PROCEDURE — 99213 OFFICE O/P EST LOW 20 MIN: CPT | Mod: ZF

## 2017-09-13 ASSESSMENT — PAIN SCALES - GENERAL: PAINLEVEL: NO PAIN (0)

## 2017-09-13 NOTE — MR AVS SNAPSHOT
After Visit Summary   9/13/2017    Steven Romeo    MRN: [id number]           Patient Information     Date Of Birth          1995        Visit Information        Provider Department      9/13/2017 8:00 AM Gael Jacobs, PhD [facility] Neuropsychology        Today's Diagnoses     Germ cell tumor (H)    -  1    Chronic post-traumatic stress disorder (PTSD)           Follow-ups after your visit        Who to contact     Please call your clinic at [phone] to:    Ask questions about your health    Make or cancel appointments    Discuss your medicines    Learn about your test results    Speak to your doctor   If you have compliments or concerns about an experience at your clinic, or if you wish to file a complaint, please contact [facility] Patient Relations at [phone] or email us at [email or web address]         Additional Information About Your Visit        MyChart Information     [employer] gives you secure access to your electronic health record. If you see a primary care provider, you can also send messages to your care team and make appointments. If you have questions, please call your primary care clinic.  If you do not have a primary care provider, please call [phone] and they will assist you.      [employer] is an electronic gateway that provides easy, online access to your medical records. With [employer], you can request a clinic appointment, read your test results, renew a prescription or communicate with your care team.     To access your existing account, please contact your [facility] or call [phone] for assistance.        Care EveryWhere ID     This is your Care EveryWhere ID. This could be used by other organizations to access your [place] medical records  [id number]         Blood Pressure from Last 3 Encounters:   09/13/17 145/83   08/21/17 112/58   06/16/17 120/62    Weight from Last 3 Encounters:    09/13/17 59.3 kg (130 lb 11.7 oz)   08/21/17 59.9 kg (132 lb)   06/16/17 61.2 kg (135 lb)              We Performed the Following     [id number] TESTING, PER HR/PSYCHOLOGIST     NEUROPSYCH TESTING BY [facility]        Primary Care Provider Office Phone # Fax #    Rita Krause PA-C [phone] [address] [place] [address]        Equal Access to Services     [facility] : Hadii aad ku hadasho Soomaali, waaxda luqadaha, qaybta kaalmada adeegyada, waxay idiin hayaan adeeg delisaaraayde labrian . So [facility] [phone].    ATENCIÓN: Si habla español, tiene a garcia disposición servicios gratuitos de asistencia lingüística. [facility] [phone].    We comply with applicable federal civil rights laws and [place] laws. We do not discriminate on the basis of race, color, national origin, age, disability sex, sexual orientation or gender identity.            Thank you!     Thank you for choosing [facility] NEUROPSYCHOLOGY  for your care. Our goal is always to provide you with excellent care. Hearing back from our patients is one way we can continue to improve our services. Please take a few minutes to complete the written survey that you may receive in the mail after your visit with us. Thank you!             Your Updated Medication List - Protect others around you: Learn how to safely use, store and throw away your medicines at www.disposemymeds.org.      Notice  As of 9/13/2017 11:59 PM    You have not been prescribed any medications.

## 2017-09-13 NOTE — MR AVS SNAPSHOT
After Visit Summary   9/13/2017    Steven Romeo    MRN: [id number]           Patient Information     Date Of Birth          1995        Visit Information        Provider Department      9/13/2017 2:00 PM Luis Alberto Melo MD Peds Hematology Oncology        Today's Diagnoses     Pineal germ cell tumor (H)    -  1          [facility], 9th floor  [address] [place] [address]  Phone: [phone]  Clinic Hours:   Monday-Friday:   7 am to 5:00 pm   closed weekends and major  holidays     If your fever is 100.5  or greater,   call the clinic during business hours.   After hours call [phone] and ask for the pediatric hematology / oncology physician to be paged for you.               Follow-ups after your visit        Follow-up notes from your care team     Return in about 3 months (around 12/13/2017) for CNS Tumor follow-up.      Who to contact     Please call your clinic at [phone] to:    Ask questions about your health    Make or cancel appointments    Discuss your medicines    Learn about your test results    Speak to your doctor   If you have compliments or concerns about an experience at your clinic, or if you wish to file a complaint, please contact [facility] Patient Relations at [phone] or email us at [email or web address]         Additional Information About Your Visit        MyChart Information     [employer] gives you secure access to your electronic health record. If you see a primary care provider, you can also send messages to your care team and make appointments. If you have questions, please call your primary care clinic.  If you do not have a primary care provider, please call [phone] and they will assist you.      [employer] is an electronic gateway that provides easy, online access to your medical records. With [employer], you can request a clinic appointment, read your  "test results, renew a prescription or communicate with your care team.     To access your existing account, please contact your [facility] or call [phone] for assistance.        Care EveryWhere ID     This is your Care EveryWhere ID. This could be used by other organizations to access your [place] medical records  [id number]        Your Vitals Were     Pulse Temperature Respirations Height Pulse Oximetry BMI (Body Mass Index)    60 97.6  F (36.4  C) (Oral) 18 1.716 m (5' 7.56\") 100% 20.14 kg/m2       Blood Pressure from Last 3 Encounters:   09/13/17 145/83   08/21/17 112/58   06/16/17 120/62    Weight from Last 3 Encounters:   09/13/17 59.3 kg (130 lb 11.7 oz)   08/21/17 59.9 kg (132 lb)   06/16/17 61.2 kg (135 lb)               Primary Care Provider Office Phone # Fax #    Rita Krause PA-C [phone] [address] [place] [address]        Equal Access to Services     [facility]: Hadii aad ku hadasho Soomaali, waaxda luqadaha, qaybta kaalmada molly, adeline rankin . So [facility] [phone].    ATENCIÓN: Si habla español, tiene a garcia disposición servicios gratuitos de asistencia lingüística. Stephany al [phone].    We comply with applicable federal civil rights laws and [place] laws. We do not discriminate on the basis of race, color, national origin, age, disability sex, sexual orientation or gender identity.            Thank you!     Thank you for choosing PEDS HEMATOLOGY ONCOLOGY  for your care. Our goal is always to provide you with excellent care. Hearing back from our patients is one way we can continue to improve our services. Please take a few minutes to complete the written survey that you may receive in the mail after your visit with us. Thank you!             Your Updated Medication List - Protect others around you: Learn how to safely use, store and throw away your medicines at www.disposemymeds.org. "      Notice  As of 9/13/2017 11:59 PM    You have not been prescribed any medications.

## 2017-09-13 NOTE — NURSING NOTE
"Chief Complaint   Patient presents with     RECHECK     Patient here today for follow upw ith pineal tumor/labs/scan results     /83 (BP Location: Left arm, Patient Position: Fowlers, Cuff Size: Adult Regular)  Pulse 60  Temp 97.6  F (36.4  C) (Oral)  Resp 18  Ht 1.716 m (5' 7.56\")  Wt 59.3 kg (130 lb 11.7 oz)  SpO2 100%  BMI 20.14 kg/m2  Nuzhat Clarke [facility]  September 13, 2017      "

## 2017-09-13 NOTE — LETTER
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 He is s/p 4 cycles of Cisplatin/-16 (per Sara, et al.) 4/22/2015 to 6/23/2015 and radiation therapy. He has returned to work, but not at his old job, and has marked anxiety and nightmares. He was recently evaluated and assessed to have PTSD - like disorder.    Initial presentation with hydrocephalus and papilledema.    3/25/15: Dr. Boyd  1. Endoscopic third ventriculostomy.   2. Endoscopic biopsy of the third ventricular lesion.   3. Placement of external ventricular drain - removed 3/28.           Review of Systems   Constitutional: Negative.    HENT: Negative.    Eyes: Negative.    Respiratory: Negative.    Cardiovascular: Negative.    Gastrointestinal: Negative.    Genitourinary: Negative.    Musculoskeletal: Negative.    Skin: Negative.    Neurological: Negative.    Endo/Heme/Allergies: Negative.    Psychiatric/Behavioral: Positive for substance abuse (marijuana, tobacco). The patient is nervous/anxious.          /83 (BP Location: Left arm, Patient Position: Fowlers, Cuff Size: Adult Regular)  Pulse 60  Temp 97.6  F (36.4  C) (Oral)  Resp 18  Ht 1.716 m (5' 7.56\")  Wt 59.3 kg (130 lb 11.7 oz)  SpO2 100%  BMI 20.14 kg/m2    Physical Exam   Constitutional: He is oriented to person, place, and time and well-developed, well-nourished, and in no distress.   HENT:   Head: Normocephalic.   Right Ear: External ear normal.   Left Ear: External ear normal.   Nose: Nose normal.   Mouth/Throat: Oropharynx is clear and moist.   Eyes: Conjunctivae and EOM are normal. Pupils are equal, round, and reactive to light.   Neck: Normal range of motion. Neck supple. No thyromegaly present.   Cardiovascular: Normal rate, regular rhythm, normal heart sounds and intact distal pulses.    No murmur heard.  Pulmonary/Chest: Effort normal and breath " sounds normal. No respiratory distress.   Abdominal: Soft. Bowel sounds are normal. He exhibits no distension and no mass. There is no tenderness.   Musculoskeletal: Normal range of motion. He exhibits no edema.   Lymphadenopathy:     He has no cervical adenopathy.   Neurological: He is alert and oriented to person, place, and time. He has normal reflexes. No cranial nerve deficit. Gait normal. Coordination normal. GCS score is 15.   Skin: Skin is warm and dry. No rash noted. No erythema.   Psychiatric: Mood and affect normal.       Results for orders placed or performed during the hospital encounter of 09/13/17   MRI Brain w/o contrast    Narrative    MR BRAIN W/O CONTRAST 9/13/2017 1:16 PM    History: Patient with germ cell tumor - Off therapy follow up,  Malignant (primary) neoplasm, unspecified    Comparison:  4/13/2016     Technique: Contrast was not given due to history of contrast allergy  despite premedication. The following sequences were obtained for the  study: Sagittal MPRAGE, sagittal T2, axial DWI, axial FLAIR, axial T2  with fat suppression, axial T1, coronal T2, axial SWI.    Comparison was made with multiple prior studies including the most  recent 4/13/2016 dated MRI.    FINDINGS: Axial T2-weighted sequences is partially motion degraded.  Previously present mass in the pineal region has been resected. On  these noncontrast images there is no evidence to suggest recurrence of  the mass. There is no hydrocephalus. There is a subtle FLAIR  hyperintensity and subtle linear susceptibility effect in the right  frontal lobe with an overlying destini hole. Findings are due to a prior  ventriculostomy tract. There is no extra-axial collection. There is no  intracranial mass, mass effect or shift. There is no acute infarction.  Calvarium and skull base marrow signal is normal apart from the right  frontal destini hole. Sellar and parasellar structures are normal. Basal  cisterns are patent.      Impression     IMPRESSION: Status post prior resection of a pineal region mass with  no evidence to suggest recurrence of tumor.    I have personally reviewed the examination and initial interpretation  and I agree with the findings.    AARON RAE MD     Impression:  1. JACKELYN  2. Post-therapy anxiety, PTSD    Plan:  Encouraged patient to pursue therapy and psychiatric evaluation to treat PTSD  RTC 3 months for f/u.      Luis Alberto Melo MD

## 2017-09-13 NOTE — LETTER
"  9/13/2017      RE: Steven Romeo  [address] 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 He is s/p 4 cycles of Cisplatin/-16 (per Sara, et al.) 4/22/2015 to 6/23/2015 and radiation therapy. He has returned to work, but not at his old job, and has marked anxiety and nightmares. He was recently evaluated and assessed to have PTSD - like disorder.    Initial presentation with hydrocephalus and papilledema.    3/25/15: Dr. Boyd  1. Endoscopic third ventriculostomy.   2. Endoscopic biopsy of the third ventricular lesion.   3. Placement of external ventricular drain - removed 3/28.           Review of Systems   Constitutional: Negative.    HENT: Negative.    Eyes: Negative.    Respiratory: Negative.    Cardiovascular: Negative.    Gastrointestinal: Negative.    Genitourinary: Negative.    Musculoskeletal: Negative.    Skin: Negative.    Neurological: Negative.    Endo/Heme/Allergies: Negative.    Psychiatric/Behavioral: Positive for substance abuse (marijuana, tobacco). The patient is nervous/anxious.          /83 (BP Location: Left arm, Patient Position: Fowlers, Cuff Size: Adult Regular)  Pulse 60  Temp 97.6  F (36.4  C) (Oral)  Resp 18  Ht 1.716 m (5' 7.56\")  Wt 59.3 kg (130 lb 11.7 oz)  SpO2 100%  BMI 20.14 kg/m2    Physical Exam   Constitutional: He is oriented to person, place, and time and well-developed, well-nourished, and in no distress.   HENT:   Head: Normocephalic.   Right Ear: External ear normal.   Left Ear: External ear normal.   Nose: Nose normal.   Mouth/Throat: Oropharynx is clear and moist.   Eyes: Conjunctivae and EOM are normal. Pupils are equal, round, and reactive to light.   Neck: Normal range of motion. Neck supple. No thyromegaly present.   Cardiovascular: Normal rate, regular rhythm, normal heart sounds and intact distal pulses.    No murmur heard.  Pulmonary/Chest: Effort normal and breath " sounds normal. No respiratory distress.   Abdominal: Soft. Bowel sounds are normal. He exhibits no distension and no mass. There is no tenderness.   Musculoskeletal: Normal range of motion. He exhibits no edema.   Lymphadenopathy:     He has no cervical adenopathy.   Neurological: He is alert and oriented to person, place, and time. He has normal reflexes. No cranial nerve deficit. Gait normal. Coordination normal. GCS score is 15.   Skin: Skin is warm and dry. No rash noted. No erythema.   Psychiatric: Mood and affect normal.       Results for orders placed or performed during the hospital encounter of 09/13/17   MRI Brain w/o contrast    Narrative    MR BRAIN W/O CONTRAST 9/13/2017 1:16 PM    History: Patient with germ cell tumor - Off therapy follow up,  Malignant (primary) neoplasm, unspecified    Comparison:  4/13/2016     Technique: Contrast was not given due to history of contrast allergy  despite premedication. The following sequences were obtained for the  study: Sagittal MPRAGE, sagittal T2, axial DWI, axial FLAIR, axial T2  with fat suppression, axial T1, coronal T2, axial SWI.    Comparison was made with multiple prior studies including the most  recent 4/13/2016 dated MRI.    FINDINGS: Axial T2-weighted sequences is partially motion degraded.  Previously present mass in the pineal region has been resected. On  these noncontrast images there is no evidence to suggest recurrence of  the mass. There is no hydrocephalus. There is a subtle FLAIR  hyperintensity and subtle linear susceptibility effect in the right  frontal lobe with an overlying destini hole. Findings are due to a prior  ventriculostomy tract. There is no extra-axial collection. There is no  intracranial mass, mass effect or shift. There is no acute infarction.  Calvarium and skull base marrow signal is normal apart from the right  frontal destini hole. Sellar and parasellar structures are normal. Basal  cisterns are patent.      Impression     IMPRESSION: Status post prior resection of a pineal region mass with  no evidence to suggest recurrence of tumor.    I have personally reviewed the examination and initial interpretation  and I agree with the findings.    AARON RAE MD     Impression:  1. JACKELYN  2. Post-therapy anxiety, PTSD    Plan:  Encouraged patient to pursue therapy and psychiatric evaluation to treat PTSD  RTC 3 months for f/u.        Luis Alberto Melo MD

## 2017-09-20 NOTE — PROGRESS NOTES
NAME: Steven Romeo  MRN: [id number]  : 1995  COMBS: 2017    Neuropsychology Laboratory  [facility]  [address]  [place]  [address] [phone]    NEUROPSYCHOLOGICAL EVALUATION    RELEVANT HISTORY AND REASON FOR REFERRAL    This is a report of neuropsychological consultation regarding Steven Romeo. Mr. Romeo is a 22-year-old, left-handed man with 12 years of formal education. 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 There was a resection, endoscopic third ventriculostomy via right frontal entry, and several cycles of chemotherapy with cisplatin and etoposide. He was previously seen by my colleague, Dr. Dilcia Ziegler, in May 2015 on referral from Dr. Luis Alberto Melo. He is now referred for neuropsychological reevaluation by EFRAÍN Gasca, CNP.     Dr. Ziegler s report from  contains significant additional background not recapitulated here. Salient points include prenatal and  complications and developmental delays for walking and talking (both starting around age 4). There were no academic delays, and he reportedly graduated from high school on the A Honor Roll. Issues with depression, anxiety, and bullying were present in high school. He was briefly treated with sertraline and fluoxetine.     The cognitive data from Dr. Ziegler s evaluation showed normal-range abilities, including learning and memory, language, intellect, attentional control, and executive functioning. There was some variable difficulty with complex visuoconstructional accuracy and fine motor skills. There were no indications of focal or lateralized cerebral dysfunction. Had there been any prior effects from his neurological condition, neurosurgery, and chemotherapy, they were seen as largely resolved. He was seen as able to actively participate in treatment or to manage his instrumental activities of daily living  independently. Objective personality assessment suggested possible antisocial personality disorder and acting out behaviors, but there were no indications of significant depression or anxiety.     Presently, Mr. Romeo describes his cognitive functioning as  pretty good.  He still has trouble with forgetfulness and distractibility, such as misplacing things, needing to make 2-3 trips back into the house to get things before leaving, and forgetting to turn off the stove when cooking. Working and having a set schedule helps. High tension and anxiety makes it worse.     Unfortunately, tension and anxiety have grown substantially over the last couple of years. He says that when the pineal tumor was discovered, he was told he would have about 6 months to live. The diagnosis, treatment, and aftermath were all difficult to deal with. Mr. Romeo is awakened nightly with nightmares, which almost always have a theme related to his tumor and treatment. He has essentially daily panic-like episodes. Fears about not getting to work on time are a major trigger. He gets shaky, his heart races, he breaks out in a rash, and he sweats excessively. These episodes last about 20 minutes. He tends to get overwhelmed with socialization, such that he does not like to have social visits with more than one person per day. There are occasional re-experiencing symptoms or intrusive thoughts in the daytime, if provoked by some sort of reminder. These are not nearly as frequent as the nightmares.     Aside from nightmares, sleep is disrupted by initial insomnia from worried and racing thoughts. He reports being unable to sleep with any less than 10 mg melatonin. He is not restored by his sleep, and he feels a need to take naps every afternoon  just to get through the rest of the day.     Melatonin is his only regular medication. Diphenhydramine did not work for sleep. He used to have a prescription for lorazepam. He has not tried medications like  prazosin or trazodone.     He has not had any concerted mental health treatment. He is open to referrals. He knows that his anxiety is a problem, and he is motivated to address it.     The rash that arises across his chest does not respond to creams and ointments. He calls it his  chemo spots,  as the problem began after chemotherapy. It is made worse with heat.     He has been told that hydrocephalus brought about lasting vision abnormalities. He has double vision when looking to the side. He cannot tolerate bright conditions. Whenever it is melina out, he needs somebody else to drive him to and from work. Between vision issues and the rash, melina and hot days bring about notably increased irritability.     Alcohol use is rare, and he denies any history of alcohol abuse. He smokes about half of a pack of cigarettes per day. He occasionally uses marijuana.     Mr. Romeo lives at home with his parents and brother. His father works in the oil fields and is not home much. His mother has rearranged her life to support him, such as aligning her work schedule with his so she can take him to and from work on days he cannot manage driving.     He works as a part-time  at a [employer]. He was hired about a year-and-a-half ago, shortly after wrapping up chemotherapy. He has been unable to manage a full-time workload. He does well enough at work, in terms of cognitive functioning and social adjustment. His supervisor is understanding and supportive, and she knows he does best when allowed to proceed through a fixed checklist of duties on his own accord.    His mother reports that Mr. Romeo has been denied SSDI benefits eight times, apparently because he can drive and holds a job. She sees his capacity for work and general life management as notably below what would be expected of the average 22-year-old. He sees himself as trying to recover from a major life setback. If he could, he would like to go back to  school. He reports a desire to study medicine or psychology.    His physical health has been largely stable in recent years. He underwent a routine monitoring MRI on the day of this evaluation. The report says there is no evidence to suggest recurrence of  the previously resected mass and no hydrocephalus.     BEHAVIORAL OBSERVATIONS    Mr. Romeo was polite and cooperative with the evaluation. Mood was dysphoric and anxious. Affect was mood-congruent. Sweating was apparent. He was open, candid, and direct, with a personable demeanor. Speech was normal with respect to fluency, prosody, articulation, paraphasic errors, and word finding. Content of speech indicated linear and goal-directed thought processes. Comprehension was full and quick. Attentional control was appropriate. He responded to cognitive challenge with spikes in anxiety. Nonetheless, he approached the evaluation with appropriate effort and persistence. The test results are seen as reasonable estimations of his cognitive status.     MEASURES ADMINISTERED    The following measures were administered by a trained psychometrist, under my direct supervision:    Orientation Questionnaire: Time, Place, Basic Personal Information, Recent U.S. Presidents;   Wechsler Adult Intelligence Scale-IV: Similarities, Digit Span, Coding; Controlled Oral Word Association Test; Animal Naming Test; Trail Making Test; Stroop Test; Abraham-Osterrieth Complex Figure Test; [place] Verbal Learning Test-II; Finger Tapping; Grooved Pegboard; Mijares Depression Inventory-II; State-Trait Anxiety Inventory; Wender-[place] Rating Scale; [place] Multiphasic Personality Bpwmhmbnd-4-OX.    RESULTS AND INTERPRETATION    Where applicable, comparisons to performances in 2015 are provided in parentheses. Not all measures obtained then were completed currently, and vice versa.     Orientation: Mr. Romeo was oriented to time, place, and basic personal information. He was able to name 3 of the last  6 U.[place]. presidents (i.e., all but 1 of the presidents in his lifetime).     Language-Related Abilities: Abstract analogical reasoning was average (stable). Associative verbal fluency was average for letter-based constraints (improved) and semantic constraints (improved).     Perceptual & Visuospatial Abilities: Copying a complex geometric figure was normal (improved), with appropriate gestalt configuration and only minor imprecision among details.     Motor Speed: Speeded finger tapping was low average and bilaterally equivalent (stable). Fine motor dexterity for peg placements was low average to average and also bilaterally equivalent (stable).     Mental Speed & Executive Functioning: Clerical speed for digit-symbol substitution was average (stable). Visual scanning and graphomotor sequencing (i.e., trail making) was high average for speed under simple conditions (slightly improved). Trail making under complex executive demands for controlling divided attention was average and had 1 error (stable). Speeded word reading was low average (slightly improved), speeded color reading was low average (slightly declined), but color naming under demands for prepotent response inhibition was average (stable).     Attention & Working Memory: Attention and working memory were average for repeating and rearranging digit strings (slightly improved).     Learning & Anterograde Memory: A few minutes after the initial copy, free recall of the complex figure was average (slightly improved). Free recall remained average after a 30-minute delay (stable), while recognition of figure elements was borderline (declined). Learning an extensive word list over repeated trials was average (stable). Delayed list recall was low average (slightly declined) after a brief delay and was impaired (declined) after a 20-minute delay. Delayed recognition of list words was low average (slightly declined).     Emotional, Behavioral, & Personality  Functioning: Mr. Romeo endorsed mildly elevated depression symptoms (BDI-II=15). He endorsed mildly above average levels of general day-to-day anxiety (Trait Anxiety=88th %ile), while in-the-moment anxiety on the testing day was rated as at least moderately elevated (State Anxiety=95th %ile). His retrospective self-report of ADHD-like traits and behaviors was slightly below the cutoff for making a strong inference of developmental ADHD (WURS=39).     The MMPI-2-RF was repeated. Mr. Romeo s response set was somewhat problem-focused but validly interpretable. Prior indications of antisocial personality traits were largely absent this time around. His clinical profile suggested a high degree of stress, worry, and anxiety, likely with an element of somatic hyperfocus and generalized malaise. This is consistent with his self-report in interview, with daily panic-like experiences and predominant physiological responses. There were also indications of persecutory ideation and aberrant experiences that could indicate delusional/magical thinking and hallucinatory experiences, but these scale elevations were slight.    IMPRESSIONS    The cognitive test data are minimally abnormal. Most measures are stable or slightly improved compared to 2015. One measure of learning and memory was performed less well than in 2015, but I suspect influence from acute anxiety. I do not see a neuropsychological profile suggesting change due to acquired organic factors. I do see indications of a subclinical tendency towards ADHD-like behaviors, which would be expected to be exacerbated in the setting of chronic stress, anxiety, and insufficient sleep.     Significant emotional distress is apparent, to a degree that was not seen in 2015. He experiences near-daily panic-like events, his sleep is disrupted every night by nightmares and insomnia, he is a bit socially inhibited, and he shows signs of somatic hyperfocus. His nightmares consistently  have a theme of neurosurgery and other aspects of his treatment. I think it is reasonable to characterize his present condition as a PTSD-like response to his tumor diagnosis and its associated threat to his life.    RECOMMENDATIONS    My primary recommendation is mental health intervention, approaching his condition from a PTSD treatment perspective.     Of course, I defer to his prescribing physician(s) on the question of medication options, but I think it is worth considering prazosin for nightmares. If melatonin use becomes insufficient, perhaps another medication for initiating and sustaining sleep could be considered. He reports some adverse experiences with sertraline in the past. With significant fatigue and ADHD-like tendencies, I wonder about appropriateness of an antidepressant with  activating  effects, like bupropion.     Psychotherapy should also be pursued. Behavioral techniques such as autogenic relaxation, breathing retraining, and somatic desensitization may be helpful. Mr. Romeo s progress would likely be aided by taking a workbook-and-manual approach, with a structured plan for sessions and worksheets he can refer back to later.     Given general stability in cognition, I do not see a need to plan on neuropsychological reevaluation along a prespecified timeline. His treating therapist and psychiatrist would be better positioned to track emotional symptoms.     Gael Jacobs, PhD,   Clinical Neuropsychologist      Time spent:  Four hours professional time, including interview, feedback, records review, data integration, and report writing (CPT 84966); an additional two hours, including testing administered by a psychometrist and interpreted by a neuropsychologist (CPT 88917). ICD-10 diagnosis: C80.1, F43.12.

## 2017-09-21 ASSESSMENT — LIFESTYLE VARIABLES: SUBSTANCE_ABUSE: 1

## 2017-09-21 ASSESSMENT — ENCOUNTER SYMPTOMS
NEUROLOGICAL NEGATIVE: 1
CARDIOVASCULAR NEGATIVE: 1
RESPIRATORY NEGATIVE: 1
NERVOUS/ANXIOUS: 1
EYES NEGATIVE: 1
MUSCULOSKELETAL NEGATIVE: 1
GASTROINTESTINAL NEGATIVE: 1
CONSTITUTIONAL NEGATIVE: 1

## 2017-09-21 NOTE — PROGRESS NOTES
"HPI   This 22 year old male with history of pineal germ cell tumor returns for F/U. He is s/p 4 cycles of Cisplatin/-16 (per Sara et al.) 4/22/2015 to 6/23/2015 and radiation therapy. He has returned to work, but not at his old job, and has marked anxiety and nightmares. He was recently evaluated and assessed to have PTSD - like disorder.    Initial presentation with hydrocephalus and papilledema.    3/25/15: Dr. Boyd  1. Endoscopic third ventriculostomy.   2. Endoscopic biopsy of the third ventricular lesion.   3. Placement of external ventricular drain - removed 3/28.           Review of Systems   Constitutional: Negative.    HENT: Negative.    Eyes: Negative.    Respiratory: Negative.    Cardiovascular: Negative.    Gastrointestinal: Negative.    Genitourinary: Negative.    Musculoskeletal: Negative.    Skin: Negative.    Neurological: Negative.    Endo/Heme/Allergies: Negative.    Psychiatric/Behavioral: Positive for substance abuse (marijuana, tobacco). The patient is nervous/anxious.          /83 (BP Location: Left arm, Patient Position: Fowlers, Cuff Size: Adult Regular)  Pulse 60  Temp 97.6  F (36.4  C) (Oral)  Resp 18  Ht 1.716 m (5' 7.56\")  Wt 59.3 kg (130 lb 11.7 oz)  SpO2 100%  BMI 20.14 kg/m2    Physical Exam   Constitutional: He is oriented to person, place, and time and well-developed, well-nourished, and in no distress.   HENT:   Head: Normocephalic.   Right Ear: External ear normal.   Left Ear: External ear normal.   Nose: Nose normal.   Mouth/Throat: Oropharynx is clear and moist.   Eyes: Conjunctivae and EOM are normal. Pupils are equal, round, and reactive to light.   Neck: Normal range of motion. Neck supple. No thyromegaly present.   Cardiovascular: Normal rate, regular rhythm, normal heart sounds and intact distal pulses.    No murmur heard.  Pulmonary/Chest: Effort normal and breath sounds normal. No respiratory distress.   Abdominal: Soft. Bowel sounds are normal. He " exhibits no distension and no mass. There is no tenderness.   Musculoskeletal: Normal range of motion. He exhibits no edema.   Lymphadenopathy:     He has no cervical adenopathy.   Neurological: He is alert and oriented to person, place, and time. He has normal reflexes. No cranial nerve deficit. Gait normal. Coordination normal. GCS score is 15.   Skin: Skin is warm and dry. No rash noted. No erythema.   Psychiatric: Mood and affect normal.       Results for orders placed or performed during the hospital encounter of 09/13/17   MRI Brain w/o contrast    Narrative    MR BRAIN W/O CONTRAST 9/13/2017 1:16 PM    History: Patient with germ cell tumor - Off therapy follow up,  Malignant (primary) neoplasm, unspecified    Comparison:  4/13/2016     Technique: Contrast was not given due to history of contrast allergy  despite premedication. The following sequences were obtained for the  study: Sagittal MPRAGE, sagittal T2, axial DWI, axial FLAIR, axial T2  with fat suppression, axial T1, coronal T2, axial SWI.    Comparison was made with multiple prior studies including the most  recent 4/13/2016 dated MRI.    FINDINGS: Axial T2-weighted sequences is partially motion degraded.  Previously present mass in the pineal region has been resected. On  these noncontrast images there is no evidence to suggest recurrence of  the mass. There is no hydrocephalus. There is a subtle FLAIR  hyperintensity and subtle linear susceptibility effect in the right  frontal lobe with an overlying destini hole. Findings are due to a prior  ventriculostomy tract. There is no extra-axial collection. There is no  intracranial mass, mass effect or shift. There is no acute infarction.  Calvarium and skull base marrow signal is normal apart from the right  frontal destini hole. Sellar and parasellar structures are normal. Basal  cisterns are patent.      Impression    IMPRESSION: Status post prior resection of a pineal region mass with  no evidence to  suggest recurrence of tumor.    I have personally reviewed the examination and initial interpretation  and I agree with the findings.    AARON RAE MD     Impression:  1. JACKELYN  2. Post-therapy anxiety, PTSD    Plan:  Encouraged patient to pursue therapy and psychiatric evaluation to treat PTSD  RTC 3 months for f/u.      Luis Alberto Melo

## 2017-10-04 ENCOUNTER — HOSPITAL ENCOUNTER (EMERGENCY)
Facility: CLINIC | Age: 22
Discharge: HOME | End: 2017-10-04
Attending: PHYSICIAN ASSISTANT | Admitting: PHYSICIAN ASSISTANT
Payer: COMMERCIAL

## 2017-10-04 VITALS
BODY MASS INDEX: 20.8 KG/M2 | TEMPERATURE: 98 F | OXYGEN SATURATION: 99 % | SYSTOLIC BLOOD PRESSURE: 118 MMHG | WEIGHT: 135 LBS | RESPIRATION RATE: 16 BRPM | HEART RATE: 87 BPM | DIASTOLIC BLOOD PRESSURE: 64 MMHG

## 2017-10-04 DIAGNOSIS — J11.1 INFLUENZA-LIKE ILLNESS: ICD-10-CM

## 2017-10-04 DIAGNOSIS — F17.210 CIGARETTE SMOKER: ICD-10-CM

## 2017-10-04 LAB
DEPRECATED S PYO AG THROAT QL EIA: NORMAL
SPECIMEN SOURCE: NORMAL

## 2017-10-04 PROCEDURE — 99283 EMERGENCY DEPT VISIT LOW MDM: CPT | Performed by: PHYSICIAN ASSISTANT

## 2017-10-04 PROCEDURE — 87081 CULTURE SCREEN ONLY: CPT | Performed by: PHYSICIAN ASSISTANT

## 2017-10-04 PROCEDURE — 87880 STREP A ASSAY W/OPTIC: CPT | Performed by: PHYSICIAN ASSISTANT

## 2017-10-04 PROCEDURE — 99282 EMERGENCY DEPT VISIT SF MDM: CPT | Mod: Z6 | Performed by: PHYSICIAN ASSISTANT

## 2017-10-04 NOTE — ED AVS SNAPSHOT
[facility]    [address] [place] [address]    Phone:  [phone]    Fax:  [phone]                                       Steven Romeo   MRN: [id number]    Department:  [facility]   Date of Visit:  10/4/2017           Patient Information     Date Of Birth          1995        Your diagnoses for this visit were:     Influenza-like illness        You were seen by Myke Acuña PA-C.      Follow-up Information     Follow up with [facility].    Specialty:  EMERGENCY MEDICINE    Why:  As needed, If symptoms worsen    Contact information:    [address] [place] [address] [phone]    Additional information:    From [address]: Exit at [address] on south side of [place]. Turn right on [address]. Turn left at stoplight on [address]. [facility] will be in view two blocks ahead      Discharge References/Attachments     URI, VIRAL, NO ABX (ADULT) (ENGLISH)      24 Hour Appointment Hotline       To make an appointment at any [facility] clinic, call [phone] ([phone]). If you don't have a family doctor or clinic, we will help you find one. [facility] clinics are conveniently located to serve the needs of you and your family.             Review of your medicines      Our records show that you are taking the medicines listed below. If these are incorrect, please call your family doctor or clinic.        Dose / Directions Last dose taken    YANET-SELTZER PLUS COLD & FLU PO        Refills:  0                Procedures and tests performed during your visit     Beta strep group A culture    Rapid strep screen      Orders Needing Specimen Collection     None      Pending Results     Date and Time Order Name Status Description    10/4/2017 2225 Beta strep group A culture In process             Pending Culture Results     Date and Time Order Name Status  Description    10/4/2017 2225 Beta strep group A culture In process             Pending Results Instructions     If you had any lab results that were not finalized at the time of your Discharge, you can call the ED Lab Result RN at [phone]. You will be contacted by this team for any positive Lab results or changes in treatment. The nurses are available 7 days a week from 10A to 6:30P.  You can leave a message 24 hours per day and they will return your call.        Thank you for choosing [facility]       Thank you for choosing [facility] for your care. Our goal is always to provide you with excellent care. Hearing back from our patients is one way we can continue to improve our services. Please take a few minutes to complete the written survey that you may receive in the mail after you visit with us. Thank you!        [facility] Information     [facility] gives you secure access to your electronic health record. If you see a primary care provider, you can also send messages to your care team and make appointments. If you have questions, please call your primary care clinic.  If you do not have a primary care provider, please call [phone] and they will assist you.        Care EveryWhere ID     This is your Care EveryWhere ID. This could be used by other organizations to access your [facility] medical records  [id number]        Equal Access to Services     GEMMA JOHNS : Suraj العلي, walorena rivera, qadannielle kaalmashira barnes, adeline jamison. So [facility] [phone].    ATENCIÓN: Si habla español, tiene a garcia disposición servicios gratuitos de asistencia lingüística. Llame al [phone].    We comply with applicable federal civil rights laws and [place] laws. We do not discriminate on the basis of race, color, national origin, age, disability, sex, sexual orientation, or gender identity.            After Visit Summary       This is your record. Keep this with you and show to  your community pharmacist(s) and doctor(s) at your next visit.

## 2017-10-04 NOTE — ED AVS SNAPSHOT
[facility]    [address] [place] [address]    Phone:  [phone]    Fax:  [phone]                                       Steven Romeo   MRN: [id number]    Department:  [facility]   Date of Visit:  10/4/2017           After Visit Summary Signature Page     I have received my discharge instructions, and my questions have been answered. I have discussed any challenges I see with this plan with the nurse or doctor.    ..........................................................................................................................................  Patient/Patient Representative Signature      ..........................................................................................................................................  Patient Representative Print Name and Relationship to Patient    ..................................................               ................................................  Date                                            Time    ..........................................................................................................................................  Reviewed by Signature/Title    ...................................................              ..............................................  Date                                                            Time

## 2017-10-04 NOTE — LETTER
To Whom it may concern:      Steven Romeo was seen in our Emergency Department today, 10/04/17.  I expect his condition to improve over the next few days.  He may return to work when improved.  Please excuse him on 10/5/2017 and 10/6/2017 due to his illness.    Sincerely,          Myke Acuña PA-C

## 2017-10-05 NOTE — ED PROVIDER NOTES
History     Chief Complaint   Patient presents with     Flu Symptoms     HPI  Steven Romeo is a 22 year old male who presents for evaluation of sore throat, headache, rhinorrhea, cough, abdominal cramps, and feeling hot. Denies any chills or measurable fever. Symptoms started this morning. He works as a cook. Denies any abnormal exposures. No recent travel. He has not taken anything for her symptoms thus far.    I have reviewed the Medications, Allergies, Past Medical and Surgical History, and Social History in the Epic system.    Allergies:   Allergies   Allergen Reactions     Gadolinium Itching     Redness/Itching immediately with Gadolinium injection.  Patient reacts even with premedication.  [facility] 4-1--2016         No current facility-administered medications on file prior to encounter.   No current outpatient prescriptions on file prior to encounter.    Patient Active Problem List   Diagnosis     Generalized anxiety disorder     Depression, major, in remission (H)     Tobacco abuse     Hydrocephalus     Pineal germ cell tumor (H)     Rash and nonspecific skin eruption     Health Care Home     Status post chemotherapy       Past Surgical History:   Procedure Laterality Date     INSERT CHEST TUBE Left 11/10/2014    Procedure: INSERT CHEST TUBE;  Surgeon: Damaso Westbrook MD;  Location: [facility] OR     REMOVE PORT VASCULAR ACCESS Right 10/12/2015    Procedure: REMOVE PORT VASCULAR ACCESS;  Surgeon: Renato Arroyo MD;  [facility]: [facility] SED[facility]      THORACOSCOPIC BLEBECTOMY Left 11/13/2014    Procedure: THORACOSCOPIC BLEBECTOMY;  Surgeon: Damaso Westbrook MD;  Location: [facility] OR     THORACOSCOPIC PLEURODESIS Left 11/13/2014    Procedure: THORACOSCOPIC PLEURODESIS;  Surgeon: Damaso Westbrook MD;  Location: [facility] OR     VENTRICULOSCOPY Right 3/25/2015    Procedure: VENTRICULOSCOPY;  Surgeon: Vasile Boyd MD;  [facility]       Social History   Substance Use Topics     Smoking status: Current  "Every Day Smoker     Packs/day: 0.25     Years: 1.00     Types: Cigarettes     Last attempt to quit: 12/9/2014     Smokeless tobacco: Never Used      Comment: Mom smokes outside.     Alcohol use 0.0 oz/week     0 Standard drinks or equivalent per week      Comment: occasionally       Most Recent Immunizations   Administered Date(s) Administered     DPT 10/16/1996     DT (PEDS <7y) 04/19/2007     HEPA 04/19/2007     HIB 10/16/1996     HepB 02/06/1996     Influenza (IIV3) 12/05/2011     Influenza Vaccine IM 3yrs+ 4 Valent IIV4 03/30/2015     MMR 03/22/2002     OPV, trivalent, live 02/06/1996     Pneumococcal 23 valent 12/05/2011     TDAP Vaccine (Boostrix) 04/19/2007     Varicella Pt Report Hx of Varicella/Chicken Pox 10/04/2000       BMI: Estimated body mass index is 20.8 kg/(m^2) as calculated from the following:    Height as of 9/13/17: 1.716 m (5' 7.56\").    Weight as of this encounter: 61.2 kg (135 lb).      Review of Systems   All other systems reviewed and are negative.      Physical Exam   BP: 138/89  Pulse: 88  Temp: 97.5  F (36.4  C)  Resp: 18  Weight: 61.2 kg (135 lb)  SpO2: 100 %  Physical Exam  Generally healthy appearing male in NAD who is active and non-toxic appearing.   Head: Normocephalic, atraumatic, nontender to palpation  Eyes: PERRLA, conjunctiva and sclera clear  Ears: Bilateral TM's and canals are clear.  TM's translucent without erythema or effusion.  Nose: Nares normal and patent bilaterally.  Mucous membranes are non-erythematous and non-edematous.  No sinus tenderness.  Throat: Mucous membranes are clear.  No tonsilar hypertrophy, exudate, or erythema.  Neck: Supple.  FROM without pain.  No adenopathy.  No thyromegaly.   Heart:  RRR with normal S1 and S2.  No S3 or S4.  No murmur, rub, gallop, or click.  PMI is nondisplaced.   Lungs:  CTA bilaterally without wheezes, rales, or rhonchi.  Good breath sounds heard throughout all lung fields.  Tympanitic to percussion with no areas of dullness. "   Abdomen: Positive bowel sounds in all four quadrants.  No tenderness to palpation throughout.  No rebound and no guarding.  No hepatosplenomegaly.  No masses.       ED Course     ED Course     Procedures             Critical Care time:  none        Results for orders placed or performed during the hospital encounter of 10/04/17   Rapid strep screen   Result Value Ref Range    Specimen Description Throat     Rapid Strep A Screen       NEGATIVE: No Group A streptococcal antigen detected by immunoassay, await culture report.             Labs Ordered and Resulted from Time of ED Arrival Up to the Time of Departure from the ED   RAPID STREP SCREEN   BETA STREP GROUP A CULTURE       Assessments & Plan (with Medical Decision Making)  Influenza-like illness     22 year old male presents for evaluation of symptoms starting this morning with sore throat, headache, rhinorrhea, cough, abdominal cramps, and feeling hot. On exam patient appears mildly ill, but is afebrile. Exam completely normal. Rapid strep test negative. Further workup not indicated at this time given his normal pulmonary exam. I'm concerned that he has influenza. Rapid influenza testing is not available at this time. Symptomatic care discussed with the patient. Push clear fluids and rest. Tylenol as needed. Follow-up as symptoms worsening or changing. The patient was in agreement with this plan and was suitable for discharge.      I have reviewed the nursing notes.    I have reviewed the findings, diagnosis, plan and need for follow up with the patient.       Discharge Medication List as of 10/4/2017 11:14 PM          Final diagnoses:   Influenza-like illness       Disclaimer: This note consists of symbols derived from keyboarding, dictation and/or voice recognition software. As a result, there may be errors in the script that have gone undetected. Please consider this when interpreting information found in this chart.      10/4/2017   Myke Acuña PA-C    [facility] EMERGENCY DEPARTMENT     Myke Acuña PA-C  10/04/17 4619

## 2017-10-06 LAB
BACTERIA SPEC CULT: NORMAL
SPECIMEN SOURCE: NORMAL

## 2017-10-07 ENCOUNTER — HOSPITAL ENCOUNTER (EMERGENCY)
Facility: CLINIC | Age: 22
Discharge: HOME | End: 2017-10-07
Attending: FAMILY MEDICINE | Admitting: FAMILY MEDICINE
Payer: COMMERCIAL

## 2017-10-07 ENCOUNTER — APPOINTMENT (OUTPATIENT)
Dept: GENERAL RADIOLOGY | Facility: CLINIC | Age: 22
End: 2017-10-07
Attending: FAMILY MEDICINE
Payer: COMMERCIAL

## 2017-10-07 VITALS
DIASTOLIC BLOOD PRESSURE: 79 MMHG | TEMPERATURE: 97.7 F | OXYGEN SATURATION: 100 % | WEIGHT: 130 LBS | SYSTOLIC BLOOD PRESSURE: 117 MMHG | RESPIRATION RATE: 18 BRPM | BODY MASS INDEX: 20.03 KG/M2 | HEART RATE: 72 BPM

## 2017-10-07 DIAGNOSIS — J20.9 ACUTE BRONCHITIS, UNSPECIFIED ORGANISM: ICD-10-CM

## 2017-10-07 PROCEDURE — 71020 XR CHEST 2 VW: CPT | Mod: TC

## 2017-10-07 PROCEDURE — 99283 EMERGENCY DEPT VISIT LOW MDM: CPT | Mod: 25 | Performed by: FAMILY MEDICINE

## 2017-10-07 PROCEDURE — 99284 EMERGENCY DEPT VISIT MOD MDM: CPT | Mod: Z6 | Performed by: FAMILY MEDICINE

## 2017-10-07 RX ORDER — AZITHROMYCIN 250 MG/1
TABLET, FILM COATED ORAL
Qty: 6 TABLET | Refills: 0 | Status: SHIPPED | OUTPATIENT
Start: 2017-10-07 | End: 2017-10-12

## 2017-10-07 NOTE — LETTER
To Whom it may concern:      Steven Romeo was seen in our Emergency Department today, 10/07/17.    He needs to be excused from work today.  I expect his condition to improve over the next 2 days.  He may return to work/school when improved.    Sincerely,        Aquiles Bahena MD

## 2017-10-07 NOTE — ED AVS SNAPSHOT
[facility] Emergency Department    [address] [place] [address]    Phone:  [phone]    Fax:  [phone]                                       Steven Romeo   MRN: [id number]    Department:  [facility] Emergency Department   Date of Visit:  10/7/2017           After Visit Summary Signature Page     I have received my discharge instructions, and my questions have been answered. I have discussed any challenges I see with this plan with the nurse or doctor.    ..........................................................................................................................................  Patient/Patient Representative Signature      ..........................................................................................................................................  Patient Representative Print Name and Relationship to Patient    ..................................................               ................................................  Date                                            Time    ..........................................................................................................................................  Reviewed by Signature/Title    ...................................................              ..............................................  Date                                                            Time

## 2017-10-07 NOTE — ED AVS SNAPSHOT
[facility] Emergency Department    [address] [place] [address]    Phone:  [phone]    Fax:  [phone]                                       Steven Romeo   MRN: [id number]    Department:  [facility] Emergency Department   Date of Visit:  10/7/2017           Patient Information     Date Of Birth          1995        Your diagnoses for this visit were:     Acute bronchitis, unspecified organism        You were seen by Aquiles Bahena MD.      Follow-up Information     Follow up with Rita Krause PA-C. Schedule an appointment as soon as possible for a visit in 5 days.    Specialty:  Family Practice    Why:  If not improving.    Contact information:    [address] [place] [address] [phone]          Discharge Instructions         Acute Bronchitis  Your healthcare provider has told you that you have acute bronchitis. Bronchitis is infection or inflammation of the bronchial tubes (airways in the lungs). Normally, air moves easily in and out of the airways. Bronchitis narrows the airways, making it harder for air to flow in and out of the lungs. This causes symptoms such as shortness of breath, coughing up yellow or green mucus, and wheezing. Bronchitis can be acute or chronic. Acute means the condition comes on quickly and goes away in a short time, usually within 3 to 10 days. Chronic means a condition lasts a long time and often comes back.    What causes acute bronchitis?  Acute bronchitis almost always starts as a viral respiratory infection, such as a cold or the flu. Certain factors make it more likely for a cold or flu to turn into bronchitis. These include being very young, being elderly, having a heart or lung problem, or having a weak immune system. Cigarette smoking also makes bronchitis more likely.  When bronchitis develops, the airways become swollen. The airways may also become infected with bacteria. This is known as a secondary  infection.  Diagnosing acute bronchitis  Your healthcare provider will examine you and ask about your symptoms and health history. You may also have a sputum culture to test the fluid in your lungs. Chest X-rays may be done to look for infection in the lungs.  Treating acute bronchitis  Bronchitis usually clears up as the cold or flu goes away. You can help feel better faster by doing the following:    Take medicine as directed. You may be told to take ibuprofen or other over-the-counter medicines. These help relieve inflammation in your bronchial tubes. Your healthcare provider may prescribe an inhaler to help open up the bronchial tubes. Most of the time, acute bronchitis is caused by a viral infection. Antibiotics are usually not prescribed for viral infections.    Drink plenty of fluids, such as water, juice, or warm soup. Fluids loosen mucus so that you can cough it up. This helps you breathe more easily. Fluids also prevent dehydration.    Make sure you get plenty of rest.    Do not smoke. Do not allow anyone else to smoke in your home.  Recovery and follow-up  Follow up with your doctor as you are told. You will likely feel better in a week or two. But a dry cough can linger beyond that time. Let your doctor know if you still have symptoms (other than a dry cough) after 2 weeks, or if you re prone to getting bronchial infections. Take steps to protect yourself from future infections. These steps include stopping smoking and avoiding tobacco smoke, washing your hands often, and getting a yearly flu shot.  When to call your healthcare provider  Call the healthcare provider if you have any of the following:    Fever of 100.4 F (38.0 C) or higher, or as advised    Symptoms that get worse, or new symptoms    Trouble breathing    Symptoms that don t start to improve within a week, or within 3 days of taking antibiotics   Date Last Reviewed: 12/1/2016 2000-2017 [employer]. [address],  [place] [address]. All rights reserved. This information is not intended as a substitute for professional medical care. Always follow your healthcare professional's instructions.          Future Appointments        Provider Department Dept Phone Center    10/9/2017 10:20 AM Yola Powers PA-C [facility] [phone] [facility]    12/5/2017 11:00 AM Luis Alberto Melo MD [facility]emat[facility] Oncology [phone] [facility]      24 Hour Appointment Hotline       To make an appointment at any [facility], call [phone] ([phone]). If you don't have a family doctor or clinic, we will help you find one. [facility] are conveniently located to serve the needs of you and your family.             Review of your medicines      START taking        Dose / Directions Last dose taken    azithromycin 250 MG tablet   Commonly known as:  ZITHROMAX Z-MIAH   Quantity:  6 tablet        Two tablets on the first day, then one tablet daily for the next 4 days   Refills:  0          Our records show that you are taking the medicines listed below. If these are incorrect, please call your family doctor or clinic.        Dose / Directions Last dose taken    YANET-SELTZER PLUS COLD & FLU PO        Refills:  0        VITAMIN C PO        Refills:  0                Prescriptions were sent or printed at these locations (1 Prescription)                   [facility]   [address] [address]    Telephone:  [phone]   Fax:  [phone]   Hours:                  These medications are not ready yet because we are checking if your insurance will help you pay for them. Call your pharmacy to confirm that your medication is ready for pickup. It may take up to 24 hours for them to receive the prescription. If the prescription is not ready within 3 business days, please contact your clinic or your provider (1 of 1)         azithromycin (ZITHROMAX Z-MIAH) 250 MG  tablet                Procedures and tests performed during your visit     XR Chest 2 Views      Orders Needing Specimen Collection     None      Pending Results     No orders found from 10/5/2017 to 10/8/2017.            Pending Culture Results     No orders found from 10/5/2017 to 10/8/2017.            Pending Results Instructions     If you had any lab results that were not finalized at the time of your Discharge, you can call the ED Lab Result RN at [phone]. You will be contacted by this team for any positive Lab results or changes in treatment. The nurses are available 7 days a week from 10A to 6:30P.  You can leave a message 24 hours per day and they will return your call.        Thank you for choosing [facility]       Thank you for choosing [facility] for your care. Our goal is always to provide you with excellent care. Hearing back from our patients is one way we can continue to improve our services. Please take a few minutes to complete the written survey that you may receive in the mail after you visit with us. Thank you!        [employer] Information     [employer] gives you secure access to your electronic health record. If you see a primary care provider, you can also send messages to your care team and make appointments. If you have questions, please call your primary care clinic.  If you do not have a primary care provider, please call [phone] and they will assist you.        Care EveryWhere ID     This is your Care EveryWhere ID. This could be used by other organizations to access your [facility] medical records  [id number]        Equal Access to Services     GEMMA JOHNS : Hadii dori العلي, hammad rivera, qaybta adeline sol . So [facility] [phone].    ATENCIÓN: Si habla español, tiene a garcia disposición servicios gratuitos de asistencia lingüística. Llame al [phone].    We comply with applicable federal civil rights laws and [place]  laws. We do not discriminate on the basis of race, color, national origin, age, disability, sex, sexual orientation, or gender identity.            After Visit Summary       This is your record. Keep this with you and show to your community pharmacist(s) and doctor(s) at your next visit.

## 2017-10-07 NOTE — LETTER
To Whom it may concern:      Steven Romeo was seen in our Emergency Department today, 10/07/17.  I expect his condition to improve over the next 2 days.  He may return to work/school when improved.    Sincerely,        Aquiles Bahena MD

## 2017-10-08 NOTE — ED PROVIDER NOTES
History     Chief Complaint   Patient presents with     Flu Symptoms     HPI  Steven Romeo is a 22 year old male who presents with persistent cough body aches and chills.  Patient was senior couple of days ago diagnosed with an influenza like illness.  He had strep testing done which was negative.  Patient states he continues to feel somewhat miserable.  He feels like his cough is gotten worse.  He states he still had subjective fevers.  Patient denies new sore throat.  Patient has had recent travel, did travel about 3 weeks ago to [place] before the symptoms started.  Patient is been using over-the-counter Rupa-Barto cold which does seem to help him at night especially to sleep.    I have reviewed the Medications, Allergies, Past Medical and Surgical History, and Social History in the Epic system.        Review of Systems   All other systems reviewed and are negative.      Physical Exam   BP: 134/77  Pulse: 72  Temp: 97.7  F (36.5  C)  Resp: 18  Weight: 59 kg (130 lb)  SpO2: 97 %  Physical Exam   Constitutional: He appears well-developed and well-nourished. No distress.   HENT:   Head: Normocephalic and atraumatic.   Mouth/Throat: Oropharynx is clear and moist. No oropharyngeal exudate.   Eyes: Conjunctivae are normal.   Neck: Normal range of motion.   Cardiovascular: Normal rate, regular rhythm and normal heart sounds.    No murmur heard.  Pulmonary/Chest: Effort normal. No respiratory distress. He has no wheezes. He has rhonchi. He has no rales.   Abdominal: Soft. Bowel sounds are normal. He exhibits no distension. There is no tenderness. There is no rebound.   Skin: He is not diaphoretic.   Nursing note and vitals reviewed.      ED Course     ED Course     Procedures         x-ray was reviewed by myself and there does appear to be this shaky area of fear atelectasis early infiltrate the right lung base.  This was consistent with my exam.  The still concern to be a viral process but in light of this  finding, I'm going to treat with the course of Zithromax.  Patient will continue to use [place] cough and cold.    Assessments & Plan (with Medical Decision Making)  acute bronchitis      I have reviewed the nursing notes.    I have reviewed the findings, diagnosis, plan and need for follow up with the patient.        10/7/2017   [facility] EMERGENCY DEPARTMENT     Aquiles Bahena MD  10/07/17 2144       Aquiles Bahena MD  10/07/17 [id number]

## 2017-10-08 NOTE — ED NOTES
Pt c/o continue cough, body aches, chills, HA.  Diagnosed with influenza like symptoms a few days ago.  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

## 2017-10-08 NOTE — DISCHARGE INSTRUCTIONS

## 2017-10-12 NOTE — PROGRESS NOTES
Date: 17  Staff: LINDA  Tech: DUDLEY  NAME:  Steven Romeo  :  [birth year]  Age: 22  Sex: M  Hand: L  Educ:  12    WAIS-IV     Raw SS     Similarities  24 10     Digit Span  28 10     Coding  70 9    COWAT   Raw: 25  z: -0.51     Animal Naming Test   Raw: 24  z: 1.0     Trail Making Test    Raw  Err z   A 14  0 1.39   B 56  1 0.17    Stroop  Raw T   Word 82 37    Color 62 38   C-W 45 50    Abraham-O     Raw T %ile     Copy    33.5 -- >16     Short Delay Recall 23 46 34     Long Delay Recall 24 48 42     Recognition Total 19 32 4    CVLT-II     Trial 1 2 3 4 5  B   7 10 8 10 12  4       Raw z     Trial 1   7 0     Trial 5   12 -0.5     Short Delay Recall 8 -1.5     Long Delay Recall 6 -2.5     Recog Hits  13 -1.5     Recog FPs  0 -0.5    Finger Tapping    Avg  z   RH  45 -0.99   LH 45.33 -1.78     Grooved Pegboard    Raw  Drops SS T   RH  71  0 10 45   LH 74  0 8 38    BDI-II  Raw: 15 Interpretation: Mild    STAI  S: 95th %ile  Interpretation: Mod-Sev  T: 88th %ile Interpretation:  Mild-Mod    WURS   Raw: 39  Interpretation: Slightly below cutoff of 45     MMPI-2-RF   RCd: 57   RC1: 72   RC2: 58   RC3: 54   RC4: 57   RC6: 66   RC7: 57   RC8: 66   RC9: 48

## 2017-11-03 DIAGNOSIS — C71.9 GERM CELL TUMOR OF BRAIN (H): Primary | ICD-10-CM

## 2017-11-03 DIAGNOSIS — D49.7 PINEAL TUMOR: ICD-10-CM

## 2017-11-03 DIAGNOSIS — D44.5 PINEAL GERM CELL TUMOR (H): Chronic | ICD-10-CM

## 2017-11-03 RX ORDER — DIPHENHYDRAMINE HCL 25 MG
TABLET ORAL
Qty: 10 TABLET | Refills: 0 | Status: SHIPPED | OUTPATIENT
Start: 2017-11-03 | End: 2021-05-21

## 2017-11-03 RX ORDER — METHYLPREDNISOLONE 4 MG/1
TABLET ORAL
Qty: 16 TABLET | Refills: 4 | Status: SHIPPED | OUTPATIENT
Start: 2017-11-03 | End: 2018-01-03

## 2017-11-08 ENCOUNTER — TELEPHONE (OUTPATIENT)
Dept: PEDIATRIC HEMATOLOGY/ONCOLOGY | Facility: CLINIC | Age: 22
End: 2017-11-08

## 2017-11-08 NOTE — TELEPHONE ENCOUNTER
ROSELYN placed call to Steven's mother, Presley to discuss referral to [facility] for upcoming appointments 12/5-12/6. She asked that a referral be made. ROSELYN faxed referral to [facility]. Presley also asked for help with connecting Steven with a health psychologist to address his PTSD and assistance with helping him apply for medical assistance, as a secondary. She noted that he is working about 10-15 hours/week at [employer]. He is not eligible for insurance through his employer and is currently under his parents insurance, which has limited coverage. ROSELYN contacted one of the adult health psychologists to inquire about connecting Steven with one of them. Mom is adamant that he see someone here in the [place] versus closer to home. ROSELYN contacted financial counseling to ask that someone meet with him at his appointment on 12/5 to help with an MA application. ROSELYN spoke with Presley this afternoon and relayed this information to her. Writer will follow-up with Presley once ROSELYN hears back from psychology re: scheduling and getting him set up with a new patient appointment. Presley denied any other needs/concerns at time of our conversation. Social work will continue to assess needs and provide ongoing psychosocial support and access to resources.      LETA Padron, LICSW, OSW-C  Clinical    Pediatric Hematology Oncology   [facility]   Monday-Thursday   Phone: [phone]  Pager: [phone]    NO LETTER

## 2017-11-09 ENCOUNTER — TELEPHONE (OUTPATIENT)
Dept: PEDIATRIC HEMATOLOGY/ONCOLOGY | Facility: CLINIC | Age: 22
End: 2017-11-09

## 2017-11-09 NOTE — TELEPHONE ENCOUNTER
SW received call back from psychologist, Radha Carmen, PhD., who noted she could see Steven in clinic. New patient appointment was scheduled for December 5th, 2017, at 8:00 am (7:45 am arrival time) at the [facility] on the 3rd floor. SW contacted Mom, Presley and provided these details including [facility] address. SW also extended [facility] reservation to check-in on 12/4. Presley denied any immediate questions/concerns at time of our phone conversation. SW noted that they can talk with Radha about preferred future follow-up at their initial visit with her. Social work will continue to assess needs and provide ongoing psychosocial support and access to resources.      LETA Padron, LICSW, OSW-C  Clinical    Pediatric Hematology Oncology   [facility]   Monday-Thursday   Phone: [phone]  Pager: [phone]    NO LETTER

## 2017-12-06 ENCOUNTER — DOCUMENTATION ONLY (OUTPATIENT)
Dept: ONCOLOGY | Facility: CLINIC | Age: 22
End: 2017-12-06

## 2017-12-06 NOTE — LETTER
Cancer Risk Management  Program Locations    [facility]  [facility]  Mailing Address  Cancer Risk Management Program  [facility]  [address]  [facility] [address] [facility] [address]  [place] [address]    New patient appointments  [phone]  December 6, 2017    Steven Romeo  [address] [facility] [place] [facility] [address]      Dear Steven,    It appears you were unable to make your recent genetic counseling appointment at the [facility]. We value you as a patient and seek to provide you with excellent quality of care.      If you would like to reschedule, please call [phone].        Sincerely,       Radha Boss MS, [facility]  Certified Genetic Counselor  P. [phone]  F. [phone]

## 2017-12-06 NOTE — PROGRESS NOTES
12/6/2017    Steven Romeo did not appear for his Cancer Risk Management Program genetic counseling appointment at the [facility].  If he would like to reschedule, appointments can be made by calling [phone].  I will have a letter sent to him.    Radha Boss MS, [facility]  Certified Genetic Counselor  P. [phone]  F. [phone]

## 2018-01-03 ENCOUNTER — HOSPITAL ENCOUNTER (EMERGENCY)
Facility: CLINIC | Age: 23
Discharge: HOME | End: 2018-01-03
Attending: FAMILY MEDICINE | Admitting: FAMILY MEDICINE
Payer: COMMERCIAL

## 2018-01-03 VITALS
SYSTOLIC BLOOD PRESSURE: 125 MMHG | WEIGHT: 136 LBS | HEART RATE: 83 BPM | OXYGEN SATURATION: 98 % | DIASTOLIC BLOOD PRESSURE: 76 MMHG | HEIGHT: 69 IN | RESPIRATION RATE: 18 BRPM | TEMPERATURE: 98.9 F | BODY MASS INDEX: 20.14 KG/M2

## 2018-01-03 DIAGNOSIS — J11.1 INFLUENZA-LIKE ILLNESS: ICD-10-CM

## 2018-01-03 PROCEDURE — 99284 EMERGENCY DEPT VISIT MOD MDM: CPT | Mod: Z6 | Performed by: FAMILY MEDICINE

## 2018-01-03 PROCEDURE — 99283 EMERGENCY DEPT VISIT LOW MDM: CPT | Performed by: FAMILY MEDICINE

## 2018-01-03 RX ORDER — OSELTAMIVIR PHOSPHATE 75 MG/1
75 CAPSULE ORAL 2 TIMES DAILY
Qty: 10 CAPSULE | Refills: 0 | Status: SHIPPED | OUTPATIENT
Start: 2018-01-03 | End: 2018-01-08

## 2018-01-03 NOTE — ED PROVIDER NOTES
"                                                            ED Provider Note     CC:     Chief Complaint   Patient presents with     Cough          History is obtained from the patient.  He is accompanied by his mother.    HPI: Steven Romeo is a 22 year old male presenting with 2-3 day history of head cold, with sinus congestion, fever, body aches, fatigue, and throat pain.  Patient has a history of pineal germ cell tumor, status post chemotherapy 2 years ago.  He has a low immunity as a result of his double chemo cocktail that he had to take.  He does not fight off infections very well.  Patient has not been able to sleep or get comfortable.  He presents to the ED with flulike symptoms.  He does not have any vomiting or diarrhea.      Patient Active Problem List   Diagnosis     Generalized anxiety disorder     Depression, major, in remission (H)     Tobacco abuse     Hydrocephalus     Pineal germ cell tumor (H)     Rash and nonspecific skin eruption     Health Care Home     Status post chemotherapy       MEDS:     No current facility-administered medications on file prior to encounter.   Current Outpatient Prescriptions on File Prior to Encounter:  diphenhydrAMINE (BENADRYL) 25 MG tablet Take 50mg ONE HOUR PRIOR to your MRI and then every 6 hours for two doses following your scan and then as directed   Ascorbic Acid (VITAMIN C PO)    Jvnxgezba-JGE-FL-APAP (YANET-SELTZER PLUS COLD & FLU PO)        Allergies: Gadolinium and Neupogen [filgrastim]    Triage and ursing notes were reviewed.    ROS: All other systems were reviewed and are negative    Physical Exam:  Vitals:    01/03/18 0814   BP: 125/76   Pulse: 83   Resp: 18   Temp: 98.9  F (37.2  C)   SpO2: 98%   Weight: 61.7 kg (136 lb)   Height: 1.753 m (5' 9\")     GENERAL APPEARANCE: Alert, pale color, no respiratory distress  HEAD: atraumatic  EYES: PERRL, EOMI  HENT: oral exam benign; mucous membranes are moist; mild oropharyngeal injection  NECK: no adenopathy or " masses, trachea is midline  RESP: lungs clear to auscultation - no rales, rhonchi or wheezes  CV: regular rate and rhythm, no significant murmurs or rubs  ABDOMEN: soft, nontender, no masses with normal bowel sounds  EXT: No edema  SKIN: no rash; as above  NEURO: mentation and speech normal; no focal deficits    No results found for this or any previous visit (from the past 24 hour(s)).        Impression:  Final diagnoses:   Influenza-like illness         ED Course & Medical Decision Making (Plan):  Steven Romeo is a 22 year old male with 2-3 day history of fever, sinus congestion, sore throat, body ache, fatigue.  Patient has underlying history of a pineal germ cell tumor, and underwent chemotherapy 2 years ago.  He is not on any current chemo treatment, but has not been able to fight off infections well.  He is accompanied by his mother who is worried about the recent respiratory infection.  Patient appears pale, but has clear breath sounds.  Temp is 98.9, blood pressure 125/76, respiratory rate of 18, with 90% oxygen saturation.  Patient appears to have influenza-like illness.  Given his history of being immunocompromised, I recommended that he start Tamiflu 75 mg twice a day for 5 days.  Continue with supportive measures.  Watch for any potential complications including high fever, increasing cough or shortness of breath, etc.  Recheck if not improving in 5 days.  Return to the ED at any time if symptoms worsen.  Written after-visit summary and instructions were given at the time of discharge.          Discharge Medication List as of 1/3/2018  9:04 AM      START taking these medications    Details   oseltamivir (TAMIFLU) 75 MG capsule Take 1 capsule (75 mg) by mouth 2 times daily for 5 days, Disp-10 capsule, R-0, E-Prescribe                 This note was completed in part using Dragon voice recognition, and may contain word and grammatical errors.        Toi Mina MD  01/03/18 3730

## 2018-01-03 NOTE — DISCHARGE INSTRUCTIONS
Thank you for giving us the opportunity to see you. The impression that you have an influenza-like illness.    Please see the handout below.  Continue supportive measures.    Begin Tamiflu 75 mg twice a day for 5 days.    If you are not seeing an improvement within 3-5 days, please follow up with your primary care provider or clinic.     After discharge, please closely monitor for any new or worsening symptoms. Return to the Emergency Department at any time if your symptoms worsen.

## 2018-01-03 NOTE — ED AVS SNAPSHOT
[facility] Emergency Department    [address] [place] [address]    Phone:  [phone]    Fax:  [phone]                                       Steven Romeo   MRN: [id number]    Department:  [facility] Emergency Department   Date of Visit:  1/3/2018           After Visit Summary Signature Page     I have received my discharge instructions, and my questions have been answered. I have discussed any challenges I see with this plan with the nurse or doctor.    ..........................................................................................................................................  Patient/Patient Representative Signature      ..........................................................................................................................................  Patient Representative Print Name and Relationship to Patient    ..................................................               ................................................  Date                                            Time    ..........................................................................................................................................  Reviewed by Signature/Title    ...................................................              ..............................................  Date                                                            Time

## 2018-01-03 NOTE — ED AVS SNAPSHOT
[facility] Emergency Department    [address] [place] [address]    Phone:  [phone]    Fax:  [phone]                                       Steven Romeo   MRN: [id number]    Department:  [facility] Emergency Department   Date of Visit:  1/3/2018           Patient Information     Date Of Birth          1995        Your diagnoses for this visit were:     Influenza-like illness        You were seen by Toi Mina MD.      Follow-up Information     Follow up with Rita Krause PA-C In 3 days.    Specialty:  Family Practice    Why:  if not improving    Contact information:    [address] [place] [id number] [phone]          Follow up with [facility] Emergency Department.    Specialty:  EMERGENCY MEDICINE    Why:  If symptoms worsen    Contact information:    [address] [place] [id number] [phone]    Additional information:    From [address]: Exit at [address] on [address] side of [place]. Turn right on [address]. Turn left at stoplight on [address]. [facility] will be in view two blocks ahead        Discharge Instructions       Thank you for giving us the opportunity to see you. The impression that you have an influenza-like illness.    Please see the handout below.  Continue supportive measures.    Begin Tamiflu 75 mg twice a day for 5 days.    If you are not seeing an improvement within 3-5 days, please follow up with your primary care provider or clinic.     After discharge, please closely monitor for any new or worsening symptoms. Return to the Emergency Department at any time if your symptoms worsen.        Discharge References/Attachments     (INFLUENZA), THE FLU (ENGLISH)      Future Appointments        Provider Department Dept Phone Center    1/16/2018 9:00 AM [facility] MRI ROOM 2 [facility], [phone] [place]    1/16/2018 10:00 AM Luis Alberto Melo MD Peds  Hematology Oncology [phone] [facility]    1/16/2018 11:00 AM Pamella Hale MD, MD Peds Dermatology [phone] [facility]    1/17/2018 2:15 PM Radha Ennis GC; 2nd Floor Consult Room [facility] [phone] [facility]    1/17/2018 3:30 PM [facility] Lab Draw [facility] Lab Draw [phone] [facility]      24 Hour Appointment Hotline       To make an appointment at any [facility], call [phone] ([phone]). If you don't have a family doctor or clinic, we will help you find one. [facility] clinics are conveniently located to serve the needs of you and your family.             Review of your medicines      START taking        Dose / Directions Last dose taken    oseltamivir 75 MG capsule   Commonly known as:  TAMIFLU   Dose:  75 mg   Quantity:  10 capsule        Take 1 capsule (75 mg) by mouth 2 times daily for 5 days   Refills:  0          Our records show that you are taking the medicines listed below. If these are incorrect, please call your family doctor or clinic.        Dose / Directions Last dose taken    ACETAMINOPHEN PO   Dose:  1000 mg        Take 1,000 mg by mouth every 6 hours as needed for pain   Refills:  0        YANET-SELTZER PLUS COLD & FLU PO        Refills:  0        diphenhydrAMINE 25 MG tablet   Commonly known as:  BENADRYL   Quantity:  10 tablet        Take 50mg ONE HOUR PRIOR to your MRI and then every 6 hours for two doses following your scan and then as directed   Refills:  0        VITAMIN C PO        Refills:  0                Prescriptions were sent or printed at these locations (1 Prescription)                   [facility] [place], [place] - [address]    [address] [facility] [address]    Telephone:  [phone]   Fax:  [phone]   Hours:                  E-Prescribed (1 of 1)         oseltamivir (TAMIFLU) 75 MG capsule                Orders Needing Specimen Collection     None      Pending Results     No orders  found from 1/1/2018 to 1/4/2018.            Pending Culture Results     No orders found from 1/1/2018 to 1/4/2018.            Pending Results Instructions     If you had any lab results that were not finalized at the time of your Discharge, you can call the ED Lab Result RN at [phone]. You will be contacted by this team for any positive Lab results or changes in treatment. The nurses are available 7 days a week from 10A to 6:30P.  You can leave a message 24 hours per day and they will return your call.        Thank you for choosing [facility]       Thank you for choosing [facility] for your care. Our goal is always to provide you with excellent care. Hearing back from our patients is one way we can continue to improve our services. Please take a few minutes to complete the written survey that you may receive in the mail after you visit with us. Thank you!        [employer] Information     [email or web address] gives you secure access to your electronic health record. If you see a primary care provider, you can also send messages to your care team and make appointments. If you have questions, please call your primary care clinic.  If you do not have a primary care provider, please call [phone] and they will assist you.        Care EveryWhere ID     This is your Care EveryWhere ID. This could be used by other organizations to access your [facility] medical records  [id number]        Equal Access to Services     GEMMA JOHNS : Suraj العلي, wajocelynda nicole, qaybta kaaladeline olea. So [facility] [phone].    ATENCIÓN: Si habla español, tiene a garcia disposición servicios gratuitos de asistencia lingüística. Llame al [phone].    We comply with applicable federal civil rights laws and [place] laws. We do not discriminate on the basis of race, color, national origin, age, disability, sex, sexual orientation, or gender identity.            After Visit Summary       This  is your record. Keep this with you and show to your community pharmacist(s) and doctor(s) at your next visit.

## 2018-01-03 NOTE — LETTER
[facility]  Emergency Room  [address] [place].   [address]  Tel: [phone]   Fax: [phone]  January 3, 2018    Steven Romeo  [address] [place] [address] [facility] [address]  [phone] (home)     : 1995          To Whom it May Concern:    Steven Romeo was seen in our ER today, January 3, 2018. I expect his condition to improve over the next 3-5 days.  He is contagious and will miss work for the next 2-3 days.  He may return to work, without restriction, when improved. If not improved during the above expected time period,  then I have encouraged him to be rechecked in his clinic for further evaluation.      Please contact me for questions or concerns.    Sincerely,      Casron Mina MD

## 2018-01-03 NOTE — ED NOTES
He has had coughing, body aches and fevers for the past 48 hours and this morning felt like he was going to collapse when going up the stairs.

## 2018-01-04 ENCOUNTER — TELEPHONE (OUTPATIENT)
Dept: FAMILY MEDICINE | Facility: CLINIC | Age: 23
End: 2018-01-04

## 2018-01-04 NOTE — TELEPHONE ENCOUNTER
Patient's mom returned call. She said that it would be okay to wait until mid week next week if Rita Krause would be able to get him in. She said they would be available any time of day any day next week. Please call back to advise.     Thank you  Virgil Ha  Patient Representative

## 2018-01-04 NOTE — TELEPHONE ENCOUNTER
Reason for Call:  Other appointment    Detailed comments: patients mother calling son needs an appointment for a follow up from ER visit for influenza due to being high risk sometime in the next 2-3 days and provider has no appointments available until 1/22/18 please call and advise     Phone Number Patient can be reached at: Cell number on file:    Telephone Information:   Mobile [phone]       Best Time: any     Can we leave a detailed message on this number? YES    Call taken on 1/4/2018 at 3:47 PM by Yoselyn Roger

## 2018-01-04 NOTE — TELEPHONE ENCOUNTER
Called pt's mom and left a msg that Per Rita we can't see him today and we are not in tomorrow and only half a day on Monday. Per ER notes he was to follow up in 5 days if not improving and go to the ER if getting worse. He may need to see someone else otherwise it would mid week next week that we can see him.  Estella Quintanilla MA

## 2018-01-18 ENCOUNTER — DOCUMENTATION ONLY (OUTPATIENT)
Dept: ONCOLOGY | Facility: CLINIC | Age: 23
End: 2018-01-18

## 2018-01-18 NOTE — TELEPHONE ENCOUNTER
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  If he would like to reschedule, appointments can be made by calling [phone].    Radha Ennis MS, [facility]  Licensed Genetic Counselor  P. [phone]  F. [phone]

## 2018-03-15 ENCOUNTER — HOSPITAL ENCOUNTER (EMERGENCY)
Facility: CLINIC | Age: 23
Discharge: HOME | End: 2018-03-15
Attending: EMERGENCY MEDICINE | Admitting: EMERGENCY MEDICINE
Payer: COMMERCIAL

## 2018-03-15 VITALS
WEIGHT: 135 LBS | RESPIRATION RATE: 18 BRPM | HEART RATE: 70 BPM | OXYGEN SATURATION: 94 % | DIASTOLIC BLOOD PRESSURE: 81 MMHG | TEMPERATURE: 97.4 F | SYSTOLIC BLOOD PRESSURE: 138 MMHG | BODY MASS INDEX: 19.94 KG/M2

## 2018-03-15 DIAGNOSIS — M54.50 ACUTE LEFT-SIDED LOW BACK PAIN WITHOUT SCIATICA: ICD-10-CM

## 2018-03-15 PROCEDURE — 99284 EMERGENCY DEPT VISIT MOD MDM: CPT | Mod: Z6 | Performed by: EMERGENCY MEDICINE

## 2018-03-15 PROCEDURE — 99282 EMERGENCY DEPT VISIT SF MDM: CPT | Performed by: EMERGENCY MEDICINE

## 2018-03-15 RX ORDER — CYCLOBENZAPRINE HCL 10 MG
10 TABLET ORAL 3 TIMES DAILY PRN
Qty: 20 TABLET | Refills: 0 | Status: SHIPPED | OUTPATIENT
Start: 2018-03-15 | End: 2018-03-21

## 2018-03-15 RX ORDER — KETOROLAC TROMETHAMINE 10 MG/1
10 TABLET, FILM COATED ORAL EVERY 6 HOURS PRN
Qty: 20 TABLET | Refills: 0 | Status: SHIPPED | OUTPATIENT
Start: 2018-03-15 | End: 2021-05-21

## 2018-03-15 ASSESSMENT — ENCOUNTER SYMPTOMS: BACK PAIN: 1

## 2018-03-15 NOTE — ED AVS SNAPSHOT
[facility] Emergency Department    [address] [place] [address]    Phone:  [phone]    Fax:  [phone]                                       Steven Romeo   MRN: [id number]    Department:  [facility] Emergency Department   Date of Visit:  3/15/2018           After Visit Summary Signature Page     I have received my discharge instructions, and my questions have been answered. I have discussed any challenges I see with this plan with the nurse or doctor.    ..........................................................................................................................................  Patient/Patient Representative Signature      ..........................................................................................................................................  Patient Representative Print Name and Relationship to Patient    ..................................................               ................................................  Date                                            Time    ..........................................................................................................................................  Reviewed by Signature/Title    ...................................................              ..............................................  Date                                                            Time

## 2018-03-15 NOTE — LETTER
[facility] EMERGENCY DEPARTMENT  [address]  [place] [address]  [phone]      March 15, 2018    Steven Romeo  [address]   [place] [address]  [phone] (home)     : 1995      To Whom it may concern:    Steven Romeo was seen in our Emergency Department today, March 15, 2018 for an injury that was possibly work related.      The employee might be taking medication so that they cannot operate moving machinery or perform activities that require balancing or working above ground.    Steven should not lift more than 10 pounds.  He should not stand for more than 45 minutes at a time without being allowed to sit for 15 minutes to rest his back.  He should be allowed to ice his back during this period of time.  He should not do any bending, twisting, pushing, or pulling for the next 5 days.  The lift restrictions are in place for the next 10 days.    Sincerely,    Miles Ayala MD   [facility]  Department of Emergency Medicine.

## 2018-03-15 NOTE — ED AVS SNAPSHOT
[facility] Emergency Department    [address] [place] [address]    Phone:  [phone]    Fax:  [phone]                                       Steven Romeo   MRN: [id number]    Department:  [facility] Emergency Department   Date of Visit:  3/15/2018           Patient Information     Date Of Birth          1995        Your diagnoses for this visit were:     Acute left-sided low back pain without sciatica        You were seen by Miles Ayala MD.      Follow-up Information     Follow up with Rita Krause PA-C.    Specialty:  Family Practice    Why:  As needed    Contact information:    [address] [place] [address] [phone]        Discharge References/Attachments     BACK PAIN, LOW: CAUSES OF LUMBAR (ENGLISH)    BACK PAIN, RELIEVING (ENGLISH)    TURNING, BACK SAFETY (ENGLISH)      24 Hour Appointment Hotline       To make an appointment at any [facility], call [phone]). If you don't have a family doctor or clinic, we will help you find one. [facility] clinics are conveniently located to serve the needs of you and your family.             Review of your medicines      START taking        Dose / Directions Last dose taken    cyclobenzaprine 10 MG tablet   Commonly known as:  FLEXERIL   Dose:  10 mg   Quantity:  20 tablet        Take 1 tablet (10 mg) by mouth 3 times daily as needed for muscle spasms   Refills:  0        ketorolac 10 MG tablet   Commonly known as:  TORADOL   Dose:  10 mg   Quantity:  20 tablet        Take 1 tablet (10 mg) by mouth every 6 hours as needed   Refills:  0          Our records show that you are taking the medicines listed below. If these are incorrect, please call your family doctor or clinic.        Dose / Directions Last dose taken    ACETAMINOPHEN PO   Dose:  1000 mg        Take 1,000 mg by mouth every 6 hours as needed for pain   Refills:  0        YANET-SELTZER PLUS COLD & FLU PO         Refills:  0        diphenhydrAMINE 25 MG tablet   Commonly known as:  BENADRYL   Quantity:  10 tablet        Take 50mg ONE HOUR PRIOR to your MRI and then every 6 hours for two doses following your scan and then as directed   Refills:  0        VITAMIN C PO        Refills:  0                Prescriptions were sent or printed at these locations (2 Prescriptions)                   [facility] - [place], [place] - [address]   [address] [address]    Telephone:  [phone]   Fax:  [phone]   Hours:                  E-Prescribed (2 of 2)         cyclobenzaprine (FLEXERIL) 10 MG tablet               ketorolac (TORADOL) 10 MG tablet                Orders Needing Specimen Collection     None      Pending Results     No orders found from 3/13/2018 to 3/16/2018.            Pending Culture Results     No orders found from 3/13/2018 to 3/16/2018.            Pending Results Instructions     If you had any lab results that were not finalized at the time of your Discharge, you can call the ED Lab Result RN at [phone]. You will be contacted by this team for any positive Lab results or changes in treatment. The nurses are available 7 days a week from 10A to 6:30P.  You can leave a message 24 hours per day and they will return your call.        Thank you for choosing [facility]       Thank you for choosing [facility] for your care. Our goal is always to provide you with excellent care. Hearing back from our patients is one way we can continue to improve our services. Please take a few minutes to complete the written survey that you may receive in the mail after you visit with us. Thank you!        [employer] Information     [employer] gives you secure access to your electronic health record. If you see a primary care provider, you can also send messages to your care team and make appointments. If you have questions, please call your primary care clinic.  If you do not have a primary care  provider, please call [phone] and they will assist you.        Care EveryWhere ID     This is your Care EveryWhere ID. This could be used by other organizations to access your [facility] medical records  [id number]        Equal Access to Services     GEMMA JAMISON: Suraj العلي, hammad rivera, qadannielle kaalmashira barnes, adeline jamison. So [facility] [phone].    ATENCIÓN: Si habla español, tiene a garcia disposición servicios gratuitos de asistencia lingüística. Llame al [phone].    We comply with applicable federal civil rights laws and [place] laws. We do not discriminate on the basis of race, color, national origin, age, disability, sex, sexual orientation, or gender identity.            After Visit Summary       This is your record. Keep this with you and show to your community pharmacist(s) and doctor(s) at your next visit.

## 2018-03-16 NOTE — ED PROVIDER NOTES
History     Chief Complaint   Patient presents with     Back Pain     The history is provided by the patient.     Steven Romeo is a 22 year old male who presents to the emergency department with concerns of left sided back pain that started a few months ago. Patient reports that the past 2 days he has noticed that the pain has been worse. He describes the pain as sharp. 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     Priority: Medium     Hydrocephalus      Priority: Medium     S/p third ventriculostomy 3/25/15       Tobacco abuse 11/09/2014     Priority: Medium     Generalized anxiety disorder 12/03/2013     Priority: Medium     Depression, major, in remission (H) 12/03/2013     Priority: Medium        Past Medical History:    Past Medical History:   Diagnosis Date     Cancer (H)      Hydrocephalus 2015     Infectious mononucleosis      Other chronic pain      Pineal tumor 2015     Pneumonia 9/13/2011     Pneumothorax on left 11/2014     Spontaneous pneumothorax 11/9/2014       Past Surgical History:    Past Surgical History:   Procedure Laterality Date     INSERT CHEST TUBE Left 11/10/2014    Procedure: INSERT CHEST TUBE;  Surgeon: Damaso Westbrook MD;  [facility]     REMOVE PORT VASCULAR ACCESS Right 10/12/2015    Procedure: REMOVE PORT VASCULAR ACCESS;  Surgeon: Renato Arroyo MD;  Location:  [facility]S SED[facility]      THORACOSCOPIC BLEBECTOMY Left 11/13/2014    Procedure: THORACOSCOPIC BLEBECTOMY;   Surgeon: Damaso Westbrook MD;  [facility]     THORACOSCOPIC PLEURODESIS Left 11/13/2014    Procedure: THORACOSCOPIC PLEURODESIS;  Surgeon: Damaso Westbrook MD;  [facility]     VENTRICULOSCOPY Right 3/25/2015    Procedure: VENTRICULOSCOPY;  Surgeon: Vasile Boyd MD;  Location: [facility]       Family History:    Family History   Problem Relation Age of Onset     Family History Negative Other        Social History:  Marital Status:  Single [1]  Social History   Substance Use Topics     Smoking status: Current Every Day Smoker     Packs/day: 0.25     Years: 1.00     Types: Cigarettes     Last attempt to quit: 12/9/2014     Smokeless tobacco: Never Used      Comment: Mom smokes outside.     Alcohol use 0.0 oz/week     0 Standard drinks or equivalent per week      Comment: occasionally        Medications:      cyclobenzaprine (FLEXERIL) 10 MG tablet   ketorolac (TORADOL) 10 MG tablet   ACETAMINOPHEN PO   diphenhydrAMINE (BENADRYL) 25 MG tablet   Ascorbic Acid (VITAMIN C PO)   Owdswtuzc-ORC-PT-APAP (YANET-SELTZER PLUS COLD & FLU PO)         Review of Systems   Musculoskeletal: Positive for back pain.   All other systems reviewed and are negative.      Physical Exam   BP: 147/86  Pulse: 67  Heart Rate: 67  Temp: 97.4  F (36.3  C)  Resp: 18  Weight: 61.2 kg (135 lb)  SpO2: 94 %      Physical Exam   Constitutional: He is oriented to person, place, and time. He appears well-developed. No distress.   Musculoskeletal:        Lumbar back: He exhibits decreased range of motion, pain and spasm.        Back:    Patient has lower perispinal muscle spasms and notable knots. Limited flexion of the lumbar spine. No midline tenderness.    Neurological: He is alert and oriented to person, place, and time. He has normal strength and normal reflexes. No cranial nerve deficit or sensory deficit.   Skin: Skin is warm.   Nursing note and vitals reviewed.      ED Course     ED Course     Procedures                   No  results found for this or any previous visit (from the past 24 hour(s)).    Medications - No data to display    Assessments & Plan (with Medical Decision Making)  Steven Romeo is a 22-year-old male presenting to the ED for evaluation of left lower back pain that started several days ago and continues to worsen.  Patient has a history significant for injury of this region previously.  He had been doing well until 3 days ago when he had a left a large fat with hot oil to pour into a drum.  He had to twist his body a certain way to be able to achieve this which most likely resulted in the strain of this muscle group.  On exam, he has significant tenderness and spasm of the left paraspinal muscle group in the lumbar spine region.  Palpation of this reproduces his symptoms.  There is no midline tenderness to palpation or percussion.  Patient has full range of motion but increased pain with side-to-side twisting and with lateral flexion to the right.  He is completely neurologically intact.  The remainder of his exam is unremarkable.  This most likely represents an acute lumbar strain.  I will put the patient on cyclobenzaprine for the muscle relaxant and Toradol for pain control and anti-inflammation.  I did advise him to ice the area 15-20 minutes every couple hours he is awake for the next 2-3 days.  I also have given him a work note putting him on work restrictions or he should not lift more than 10 pounds, not have any prolonged standing, no bending, twisting, pushing, or pulling for the next 10 days.  I did review with him indications to return to the ED for reevaluation.  All questions from the patient were answered and he was suitable for discharge in satisfactory condition.     I have reviewed the nursing notes.    I have reviewed the findings, diagnosis, plan and need for follow up with the patient.       Discharge Medication List as of 3/15/2018  9:00 PM      START taking these medications    Details    cyclobenzaprine (FLEXERIL) 10 MG tablet Take 1 tablet (10 mg) by mouth 3 times daily as needed for muscle spasms, Disp-20 tablet, R-0, E-Prescribe      ketorolac (TORADOL) 10 MG tablet Take 1 tablet (10 mg) by mouth every 6 hours as needed, Disp-20 tablet, R-0, E-Prescribe             Final diagnoses:   Acute left-sided low back pain without sciatica     This document serves as a record of services personally performed by Miles Ayala*. It was created on their behalf by Alba Scanlon, a trained medical scribe. The creation of this record is based on the provider's personal observations and the statements of the patient. This document has been checked and approved by the attending provider.  Note: Chart documentation done in part with Dragon Voice Recognition software. Although reviewed after completion, some word and grammatical errors may remain.  3/15/2018   [facility] EMERGENCY DEPARTMENT     Miles Ayala MD  03/16/18 0151

## 2018-04-04 ENCOUNTER — TELEPHONE (OUTPATIENT)
Dept: FAMILY MEDICINE | Facility: CLINIC | Age: 23
End: 2018-04-04

## 2018-04-04 NOTE — TELEPHONE ENCOUNTER
Reason for Call:  Same Day Appointment, Requested Provider:  Rita Krause PA-C    PCP: Rita Krause    Reason for visit: Would like to get worked in for an appt today for a bad wet cough. Mom is the one calling and said with his medical history it is very important to be seen today before it gets out of hand.     Duration of symptoms: within the last couple days    Have you been treated for this in the past? No    Additional comments:     Can we leave a detailed message on this number? YES    Phone number patient can be reached at: Home number on file [phone] (home)    Best Time: any    Call taken on 4/4/2018 at 10:16 AM by Chantel Sandoval

## 2018-04-04 NOTE — TELEPHONE ENCOUNTER
Called pt's mom and informed her that we can't see him today but can tomorrow or she can see if some one else has openings to see him or he will need to go to the ER with his medical history. I put him on for tomorrow but if he gets worse she needs to bring him to the ER. Mom agrees.  Estella Quintanilla MA

## 2018-04-05 ENCOUNTER — OFFICE VISIT (OUTPATIENT)
Dept: FAMILY MEDICINE | Facility: CLINIC | Age: 23
End: 2018-04-05
Payer: COMMERCIAL

## 2018-04-05 VITALS
BODY MASS INDEX: 19.79 KG/M2 | HEART RATE: 92 BPM | TEMPERATURE: 97.6 F | DIASTOLIC BLOOD PRESSURE: 66 MMHG | WEIGHT: 134 LBS | SYSTOLIC BLOOD PRESSURE: 110 MMHG | RESPIRATION RATE: 16 BRPM | OXYGEN SATURATION: 98 %

## 2018-04-05 DIAGNOSIS — B97.89 VIRAL SINUSITIS: ICD-10-CM

## 2018-04-05 DIAGNOSIS — J32.9 VIRAL SINUSITIS: ICD-10-CM

## 2018-04-05 DIAGNOSIS — D44.5 PINEAL GERM CELL TUMOR (H): Chronic | ICD-10-CM

## 2018-04-05 DIAGNOSIS — J06.9 VIRAL URI WITH COUGH: Primary | ICD-10-CM

## 2018-04-05 DIAGNOSIS — L84 CALLUS OF FOOT: ICD-10-CM

## 2018-04-05 PROCEDURE — 99214 OFFICE O/P EST MOD 30 MIN: CPT | Performed by: PHYSICIAN ASSISTANT

## 2018-04-05 RX ORDER — GUAIFENESIN 600 MG/1
1200 TABLET, EXTENDED RELEASE ORAL 2 TIMES DAILY PRN
Qty: 40 TABLET | Refills: 0 | Status: SHIPPED | OUTPATIENT
Start: 2018-04-05 | End: 2019-10-07

## 2018-04-05 RX ORDER — AMOXICILLIN 875 MG
875 TABLET ORAL 2 TIMES DAILY
Qty: 20 TABLET | Refills: 0 | Status: SHIPPED | OUTPATIENT
Start: 2018-04-05 | End: 2018-04-15

## 2018-04-05 ASSESSMENT — PAIN SCALES - GENERAL: PAINLEVEL: MILD PAIN (2)

## 2018-04-05 NOTE — PROGRESS NOTES
SUBJECTIVE:   Steven Romeo is a 22 year old male with a significant medical history including pineal germ cell tumor having underwent chemotherapy who presents to clinic today with his mother for the following health issues:      Acute Illness   Acute illness concerns: cough & URI symptoms  Onset: yesterday    Fever: no     Chills/Sweats: YES    Headache (location?): YES    Sinus Pressure:YES    Conjunctivitis:  no    Ear Pain: no    Rhinorrhea: no     Congestion: YES    Sore Throat: YES     Cough: YES-productive of yellow sputum, productive of green sputum, with shortness of breath, worsening over time    Wheeze: no     Decreased Appetite: no     Nausea: no     Vomiting: no     Diarrhea:  no     Dysuria/Freq.: no     Fatigue/Achiness: YES    Sick/Strep Exposure: YES-at work     Therapies Tried and outcome: alkaseltzer cold and flu-helped a little    He and his mother get very concerned when he develops any kind of illness due to his history of cancer and the fact that his immune system has been compromised.  They have also been told by the specialist that he should be evaluated quickly if he becomes ill.  We concerned because he has a thick green sputum which is not typical of him although he is a smoker.    They are also requesting that I look at what they believe is a plantar wart on the right plantar surface MTP joint.  He has had this for some time, has tried numerous over-the-counter wart treatments without success.  He is now experiencing a lot of pain in this area due to this lesion.        Problem list and histories reviewed & adjusted, as indicated.  Additional history: as documented    Patient Active Problem List   Diagnosis     Generalized anxiety disorder     Depression, major, in remission (H)     Tobacco abuse     Hydrocephalus     Pineal germ cell tumor (H)     Rash and nonspecific skin eruption     Health Care Home     Status post chemotherapy     Callus of foot-right     Past Surgical History:    Procedure Laterality Date     INSERT CHEST TUBE Left 11/10/2014    Procedure: INSERT CHEST TUBE;  Surgeon: Damaso Westbrook MD;  [facility]     REMOVE PORT VASCULAR ACCESS Right 10/12/2015    Procedure: REMOVE PORT VASCULAR ACCESS;  Surgeon: Renato Arroyo MD;  Location: [facility]      THORACOSCOPIC BLEBECTOMY Left 11/13/2014    Procedure: THORACOSCOPIC BLEBECTOMY;  Surgeon: Damaso Westbrook MD;  [facility]     THORACOSCOPIC PLEURODESIS Left 11/13/2014    Procedure: THORACOSCOPIC PLEURODESIS;  Surgeon: Damaso Westbrook MD;  [facility]     VENTRICULOSCOPY Right 3/25/2015    Procedure: VENTRICULOSCOPY;  Surgeon: Vasile Boyd MD;  Location:  OR       Social History   Substance Use Topics     Smoking status: Current Every Day Smoker     Packs/day: 0.25     Years: 1.00     Types: Cigarettes     Last attempt to quit: 12/9/2014     Smokeless tobacco: Never Used      Comment: Mom smokes outside.     Alcohol use 0.0 oz/week     0 Standard drinks or equivalent per week      Comment: occasionally     Family History   Problem Relation Age of Onset     Family History Negative Other            Reviewed and updated as needed this visit by clinical staff       Reviewed and updated as needed this visit by Provider         ROS:  Constitutional, HEENT, cardiovascular, pulmonary, gi and gu systems are negative, except as otherwise noted.    OBJECTIVE:     /66 (Cuff Size: Adult Regular)  Pulse 92  Temp 97.6  F (36.4  C) (Temporal)  Resp 16  Wt 134 lb (60.8 kg)  SpO2 98%  BMI 19.79 kg/m2  Body mass index is 19.79 kg/(m^2).   GENERAL: alert, no distress, fatigued and thin  HENT: both ears: clear effusion, nasal mucosa edematous , oral mucous membranes moist, tonsillar erythema and sinuses: maxillary tenderness on both sides  NECK: no adenopathy, no asymmetry, masses, or scars and thyroid normal to palpation  RESP: lungs clear to auscultation - no rales, rhonchi or wheezes.  Harsh cough during the visit.  CV: regular rate and rhythm, normal S1 S2, no S3 or S4, no murmur, click or rub, no peripheral edema and peripheral pulses strong  ABDOMEN: soft, nontender, no hepatosplenomegaly, no masses and bowel sounds normal  MS: no gross musculoskeletal defects noted, no edema  SKIN: no suspicious lesions or rashes    He has a large callus with a questionable suspicion for a plantar wart on the plantar surface of the MTP joint right foot.  This is quite large 8-10 mm in diameter.    Diagnostic Test Results:  none     ASSESSMENT:      Viral URI with cough  Pineal germ cell tumor (H)  Viral sinusitis  Callus of foot      PLAN:       ICD-10-CM    1. Viral URI with cough J06.9 guaiFENesin (MUCINEX) 600 MG 12 hr tablet    B97.89    2. Pineal germ cell tumor (H) D44.5 amoxicillin (AMOXIL) 875 MG tablet   3. Viral sinusitis J32.9 amoxicillin (AMOXIL) 875 MG tablet    B97.89    4. Callus of foot-right L84 PODIATRY/FOOT & ANKLE SURGERY REFERRAL       Tobacco Cessation:   reports that he has been smoking Cigarettes.  He has a 0.25 pack-year smoking history. He has never used smokeless tobacco.  Tobacco Cessation Action Plan: Information offered: Patient not interested at this time urged him to stop smoking particularly given his history.      MEDICATIONS:        - Start taking Amoxil and Mucinex see orders.       - Continue other medications without change  FUTURE APPOINTMENTS:       - Follow-up visit in having to 10 days or if worsening for respiratory symptoms and cough.  If any increase in fever or worsening cough will need to come back in her appointment and have chest x-ray and further workup.       - Make appointment with podiatry specialist-referral is placed  Note for work-should be out today and tomorrow recover over the weekend and can return on Monday as long as he is fever free over the weekend.    Rita Krause PA-C  [facility]    Orders Placed This Encounter      PODIATRY/FOOT & ANKLE SURGERY REFERRAL     guaiFENesin (MUCINEX) 600 MG 12 hr tablet     amoxicillin (AMOXIL) 875 MG tablet       AVS given to patient upon discharge today.  Electronically signed by Rita Krause PA-C  April 5, 2018  5:12 PM

## 2018-04-05 NOTE — NURSING NOTE
"Chief Complaint   Patient presents with     URI       Initial /66 (Cuff Size: Adult Regular)  Pulse 92  Temp 97.6  F (36.4  C) (Temporal)  Resp 16  Wt 134 lb (60.8 kg)  SpO2 98%  BMI 19.79 kg/m2 Estimated body mass index is 19.79 kg/(m^2) as calculated from the following:    Height as of 1/3/18: 5' 9\" (1.753 m).    Weight as of this encounter: 134 lb (60.8 kg).  Medication Reconciliation: complete   Cody Eugene MA      "

## 2018-04-05 NOTE — LETTER
[address]   [address]  Tel. [phone] / Fax [phone]    April 5, 2018        Steven Romeo  [address] [facility] [address]          TO WHOM IT MAY CONCERN:    Steven was seen in the clinic today for illness.  He should stay home and rest today and tomorrow to recover.  He is currently contagious and should not be at work.  He may return on Monday April 9th.    Sincerely,        Rita Krause PA-C

## 2018-04-05 NOTE — MR AVS SNAPSHOT
After Visit Summary   4/5/2018    Steven Romeo    MRN: [id number]           Patient Information     Date Of Birth          1995        Visit Information        Provider Department      4/5/2018 11:45 AM Rita Krause PA-C [facility]        Today's Diagnoses     Viral URI with cough    -  1    Pineal germ cell tumor (H)        Viral sinusitis        Callus of foot-right           Follow-ups after your visit        Additional Services     PODIATRY/FOOT & ANKLE SURGERY REFERRAL       Your provider has referred you to: [facility]: [facility] [phone]   [email or web address]    Please be aware that coverage of these services is subject to the terms and limitations of your health insurance plan.  Call member services at your health plan with any benefit or coverage questions.      Please bring the following to your appointment:  >>   Any x-rays, CTs or MRIs which have been performed.  Contact the facility where they were done to arrange for  prior to your scheduled appointment.    >>   List of current medications   >>   This referral request   >>   Any documents/labs given to you for this referral                  Who to contact     If you have questions or need follow up information about today's clinic visit or your schedule please contact [facility] directly at [phone].  Normal or non-critical lab and imaging results will be communicated to you by MyChart, letter or phone within 4 business days after the clinic has received the results. If you do not hear from us within 7 days, please contact the clinic through MyChart or phone. If you have a critical or abnormal lab result, we will notify you by phone as soon as possible.  Submit refill requests through [employer] or call your pharmacy and they will forward the refill request to us. Please allow 3 business days for your refill to be completed.           Additional Information About Your Visit        MyChart Information     [employer] gives you secure access to your electronic health record. If you see a primary care provider, you can also send messages to your care team and make appointments. If you have questions, please call your primary care clinic.  If you do not have a primary care provider, please call [phone] and they will assist you.        Care EveryWhere ID     This is your Care EveryWhere ID. This could be used by other organizations to access your [facility] medical records  [id number]        Your Vitals Were     Pulse Temperature Respirations Pulse Oximetry BMI (Body Mass Index)       92 97.6  F (36.4  C) (Temporal) 16 98% 19.79 kg/m2        Blood Pressure from Last 3 Encounters:   04/05/18 110/66   03/15/18 138/81   01/03/18 125/76    Weight from Last 3 Encounters:   04/05/18 134 lb (60.8 kg)   03/15/18 135 lb (61.2 kg)   01/03/18 136 lb (61.7 kg)              We Performed the Following     PODIATRY/FOOT & ANKLE SURGERY REFERRAL          Today's Medication Changes          These changes are accurate as of 4/5/18  5:19 PM.  If you have any questions, ask your nurse or doctor.               Start taking these medicines.        Dose/Directions    amoxicillin 875 MG tablet   Commonly known as:  AMOXIL   Used for:  Pineal germ cell tumor (H), Viral sinusitis   Started by:  Rita Krause PA-C        Dose:  875 mg   Take 1 tablet (875 mg) by mouth 2 times daily for 10 days   Quantity:  20 tablet   Refills:  0       guaiFENesin 600 MG 12 hr tablet   Commonly known as:  MUCINEX   Used for:  Viral URI with cough   Started by:  Rita Krause PA-C        Dose:  1200 mg   Take 2 tablets (1,200 mg) by mouth 2 times daily as needed for congestion   Quantity:  40 tablet   Refills:  0            Where to get your medicines      These medications were sent to [facility] [place] [place] - [address]  [place] [address]     Phone:  [phone]     amoxicillin 875 MG tablet    guaiFENesin 600 MG 12 hr tablet                Primary Care Provider Office Phone # Fax #    Rita Krause PA-C [phone]       [address] [place] [address]        Equal Access to Services     [facility] : Hadii aad ku hadasho Soomaali, waaxda luqadaha, qaybta kaalmada adeegyada, waxay idiin hayaan adeeg suresh labrian . So [facility] [phone].    ATENCIÓN: Si habla español, tiene a garcia disposición servicios gratuitos de asistencia lingüística. [facility] [phone].    We comply with applicable federal civil rights laws and [place] laws. We do not discriminate on the basis of race, color, national origin, age, disability, sex, sexual orientation, or gender identity.            Thank you!     Thank you for choosing [facility]  for your care. Our goal is always to provide you with excellent care. Hearing back from our patients is one way we can continue to improve our services. Please take a few minutes to complete the written survey that you may receive in the mail after your visit with us. Thank you!             Your Updated Medication List - Protect others around you: Learn how to safely use, store and throw away your medicines at www.disposemymeds.org.          This list is accurate as of 4/5/18  5:19 PM.  Always use your most recent med list.                   Brand Name Dispense Instructions for use Diagnosis    ACETAMINOPHEN PO      Take 1,000 mg by mouth every 6 hours as needed for pain        YANET-SELTZER PLUS COLD & FLU PO           amoxicillin 875 MG tablet    AMOXIL    20 tablet    Take 1 tablet (875 mg) by mouth 2 times daily for 10 days    Pineal germ cell tumor (H), Viral sinusitis       diphenhydrAMINE 25 MG tablet    BENADRYL    10 tablet    Take 50mg ONE HOUR PRIOR to your MRI and then every 6 hours for two doses following your scan and then as directed    Pineal tumor       guaiFENesin  600 MG 12 hr tablet    MUCINEX    40 tablet    Take 2 tablets (1,200 mg) by mouth 2 times daily as needed for congestion    Viral URI with cough       ketorolac 10 MG tablet    TORADOL    20 tablet    Take 1 tablet (10 mg) by mouth every 6 hours as needed        VITAMIN C PO

## 2018-04-06 ASSESSMENT — PATIENT HEALTH QUESTIONNAIRE - PHQ9: SUM OF ALL RESPONSES TO PHQ QUESTIONS 1-9: 0

## 2018-04-09 ENCOUNTER — OFFICE VISIT (OUTPATIENT)
Dept: FAMILY MEDICINE | Facility: CLINIC | Age: 23
End: 2018-04-09
Payer: COMMERCIAL

## 2018-04-09 ENCOUNTER — OFFICE VISIT (OUTPATIENT)
Dept: PODIATRY | Facility: CLINIC | Age: 23
End: 2018-04-09
Payer: COMMERCIAL

## 2018-04-09 ENCOUNTER — OFFICE VISIT (OUTPATIENT)
Dept: BEHAVIORAL HEALTH | Facility: CLINIC | Age: 23
End: 2018-04-09

## 2018-04-09 ENCOUNTER — HOSPITAL ENCOUNTER (OUTPATIENT)
Dept: GENERAL RADIOLOGY | Facility: CLINIC | Age: 23
Discharge: HOME | End: 2018-04-09
Attending: PHYSICIAN ASSISTANT | Admitting: PHYSICIAN ASSISTANT
Payer: COMMERCIAL

## 2018-04-09 VITALS
SYSTOLIC BLOOD PRESSURE: 110 MMHG | WEIGHT: 134 LBS | HEIGHT: 68 IN | TEMPERATURE: 96.8 F | DIASTOLIC BLOOD PRESSURE: 72 MMHG | BODY MASS INDEX: 20.31 KG/M2

## 2018-04-09 VITALS
BODY MASS INDEX: 20.64 KG/M2 | HEART RATE: 88 BPM | DIASTOLIC BLOOD PRESSURE: 72 MMHG | OXYGEN SATURATION: 98 % | RESPIRATION RATE: 18 BRPM | TEMPERATURE: 96.8 F | SYSTOLIC BLOOD PRESSURE: 110 MMHG | WEIGHT: 134 LBS

## 2018-04-09 DIAGNOSIS — F51.5 NIGHTMARES: ICD-10-CM

## 2018-04-09 DIAGNOSIS — F41.0 PANIC ATTACKS: ICD-10-CM

## 2018-04-09 DIAGNOSIS — F43.10 PTSD (POST-TRAUMATIC STRESS DISORDER): ICD-10-CM

## 2018-04-09 DIAGNOSIS — R06.02 SOB (SHORTNESS OF BREATH) ON EXERTION: ICD-10-CM

## 2018-04-09 DIAGNOSIS — J06.9 VIRAL URI WITH COUGH: Primary | ICD-10-CM

## 2018-04-09 DIAGNOSIS — F33.1 MODERATE RECURRENT MAJOR DEPRESSION (H): ICD-10-CM

## 2018-04-09 DIAGNOSIS — F41.1 GAD (GENERALIZED ANXIETY DISORDER): ICD-10-CM

## 2018-04-09 DIAGNOSIS — J06.9 VIRAL URI WITH COUGH: ICD-10-CM

## 2018-04-09 DIAGNOSIS — F41.1 GAD (GENERALIZED ANXIETY DISORDER): Primary | ICD-10-CM

## 2018-04-09 DIAGNOSIS — B07.0 VERRUCA PLANTARIS: Primary | ICD-10-CM

## 2018-04-09 PROCEDURE — 99207 ZZC NO CHARGE BEHAVIORAL WARM HANDOFF: CPT | Performed by: MARRIAGE & FAMILY THERAPIST

## 2018-04-09 PROCEDURE — 99214 OFFICE O/P EST MOD 30 MIN: CPT | Performed by: PHYSICIAN ASSISTANT

## 2018-04-09 PROCEDURE — 99203 OFFICE O/P NEW LOW 30 MIN: CPT | Performed by: PODIATRIST

## 2018-04-09 PROCEDURE — 71046 X-RAY EXAM CHEST 2 VIEWS: CPT | Mod: TC

## 2018-04-09 ASSESSMENT — PAIN SCALES - GENERAL
PAINLEVEL: NO PAIN (0)
PAINLEVEL: MILD PAIN (2)

## 2018-04-09 NOTE — MR AVS SNAPSHOT
After Visit Summary   4/9/2018    Steven Romeo    MRN: [id number]           Patient Information     Date Of Birth          1995        Visit Information        Provider Department      4/9/2018 8:15 AM Ronaldo Golden DPM [facility] Instructions    Wart treatment    1.  After bathing or soaking for 5 minutes, remove any loose or dead skin with a fingernail clipper, pumice stone, emery board, or 120 grit sandpaper.      2.  Place one drop of salicylic acid liquid or a patch on each wart. Try to apply this medication about 2 mm beyond the lesion and try not to get the medicine on the surrounding skin.     3.  Cover the treated area with strong tape to keep the medicine in place. You can use athletic tape, a Band-Aid, or duct tape.  Keep this in place all day.  If you skip a couple days, you will lose the benefit of the previous several days of treatment.     4.  Every few days remove the dead skin.  Reapply the salicylic acid each day, keeping it on place all day.     5.  If you have severe or intolerable skin irritation, skip a day between treatments. Then start treatment again. However, skipping treatments will slow down how quickly warts will go away.    6.  To permanently get rid of plantar warts will require 3-12 weeks of constant treatment and irritation.      7.  To move treatment along faster, but also more painful, you may apply over the counter liquid nitrogen to the wart once weekly and do not apply salicylic acid that day, then resume as above.  If a blister forms 1-2 days after the liquid nitrogen application, pop the blister and apply a bandaid. Then reapply the salicylic acid the next day.     8.  Follow up as instructed.  If you develop complications, you must be seen in clinic to evaluate and treat the complication.              Follow-ups after your visit        Your next 10 appointments already scheduled     Apr 09, 2018 11:00 AM CDT   SHORT with  "Rita Krause PA-C   [facility]    [address] [id number] [phone]            Apr 19, 2018 10:30 AM CDT   New Visit with Ronaldo Golden DPM   [facility] ([facility])    [address] [place] [id number] [phone]              Who to contact     If you have questions or need follow up information about today's clinic visit or your schedule please contact [facility] directly at [phone].  Normal or non-critical lab and imaging results will be communicated to you by MyChart, letter or phone within 4 business days after the clinic has received the results. If you do not hear from us within 7 days, please contact the clinic through [employer] or phone. If you have a critical or abnormal lab result, we will notify you by phone as soon as possible.  Submit refill requests through [employer] or call your pharmacy and they will forward the refill request to us. Please allow 3 business days for your refill to be completed.          Additional Information About Your Visit        [employer] Information     [employer] gives you secure access to your electronic health record. If you see a primary care provider, you can also send messages to your care team and make appointments. If you have questions, please call your primary care clinic.  If you do not have a primary care provider, please call [phone] and they will assist you.        Care EveryWhere ID     This is your Care EveryWhere ID. This could be used by other organizations to access your [place] medical records  [id number]        Your Vitals Were     Temperature Height BMI (Body Mass Index)             96.8  F (36  C) (Temporal) 5' 7.56\" (1.716 m) 20.64 kg/m2          Blood Pressure from Last 3 Encounters:   04/09/18 110/72   04/05/18 110/66   03/15/18 138/81    Weight from Last 3 Encounters:   04/09/18 134 lb (60.8 kg) "   04/05/18 134 lb (60.8 kg)   03/15/18 135 lb (61.2 kg)              Today, you had the following     No orders found for display       Primary Care Provider Office Phone # Fax #    Rita Krause PA-C [phone] [address] [place] [address]        Equal Access to Services     [facility]: Hadii aad ku hadasho Soomaali, waaxda luqadaha, qaybta kaalmada adeegyada, waxay idiin hayaan adeeg kharash labrian . So [facility] [phone].    ATENCIÓN: Si habla español, tiene a garcia disposición servicios gratuitos de asistencia lingüística. Stephany al [phone].    We comply with applicable federal civil rights laws and [place] laws. We do not discriminate on the basis of race, color, national origin, age, disability, sex, sexual orientation, or gender identity.            Thank you!     Thank you for choosing [facility]  for your care. Our goal is always to provide you with excellent care. Hearing back from our patients is one way we can continue to improve our services. Please take a few minutes to complete the written survey that you may receive in the mail after your visit with us. Thank you!             Your Updated Medication List - Protect others around you: Learn how to safely use, store and throw away your medicines at www.disposemymeds.org.          This list is accurate as of 4/9/18  8:51 AM.  Always use your most recent med list.                   Brand Name Dispense Instructions for use Diagnosis    ACETAMINOPHEN PO      Take 1,000 mg by mouth every 6 hours as needed for pain        YANET-SELTZER PLUS COLD & FLU PO           amoxicillin 875 MG tablet    AMOXIL    20 tablet    Take 1 tablet (875 mg) by mouth 2 times daily for 10 days    Pineal germ cell tumor (H), Viral sinusitis       diphenhydrAMINE 25 MG tablet    BENADRYL    10 tablet    Take 50mg ONE HOUR PRIOR to your MRI and then every 6 hours for two doses following your scan and then as directed    Pineal  tumor       guaiFENesin 600 MG 12 hr tablet    MUCINEX    40 tablet    Take 2 tablets (1,200 mg) by mouth 2 times daily as needed for congestion    Viral URI with cough       ketorolac 10 MG tablet    TORADOL    20 tablet    Take 1 tablet (10 mg) by mouth every 6 hours as needed        VITAMIN C PO

## 2018-04-09 NOTE — MR AVS SNAPSHOT
After Visit Summary   4/9/2018    Steven Romeo    MRN: [id number]           Patient Information     Date Of Birth          1995        Visit Information        Provider Department      4/9/2018 12:02 PM Tiffanie Pedroza LMFT [facility]        Today's Diagnoses     FREDA (generalized anxiety disorder)    -  1    Panic attacks        PTSD (post-traumatic stress disorder)           Follow-ups after your visit        Your next 10 appointments already scheduled     Apr 10, 2018  2:30 PM CDT   New Visit with ALVINA Brian   [address] [address]   [phone]            Apr 19, 2018 10:30 AM CDT   New Visit with Ronaldo Golden DPM   [address] [address]   [phone]            Apr 30, 2018  9:45 AM CDT   Return Visit with Ronaldo Golden DPM   [facility] ([address] [address]   [phone]              Future tests that were ordered for you today     Open Future Orders        Priority Expected Expires Ordered    XR Chest 2 Views Routine 4/9/2018 4/9/2019 4/9/2018            Who to contact     If you have questions or need follow up information about today's clinic visit or your schedule please contact [facility] directly at [phone].  Normal or non-critical lab and imaging results will be communicated to you by MyChart, letter or phone within 4 business days after the clinic has received the results. If you do not hear from us within 7 days, please contact the clinic through MyChart or phone. If you have a critical or abnormal lab result, we will notify you by phone as soon as possible.  Submit refill requests through ExpenseBot or call your pharmacy and they will forward the refill request to us. Please allow 3  business days for your refill to be completed.          Additional Information About Your Visit        MyChart Information     [employer] gives you secure access to your electronic health record. If you see a primary care provider, you can also send messages to your care team and make appointments. If you have questions, please call your primary care clinic.  If you do not have a primary care provider, please call [phone] and they will assist you.        Care EveryWhere ID     This is your Care EveryWhere ID. This could be used by other organizations to access your [place] medical records  [id number]         Blood Pressure from Last 3 Encounters:   04/09/18 110/72   04/09/18 110/72   04/05/18 110/66    Weight from Last 3 Encounters:   04/09/18 60.8 kg (134 lb)   04/09/18 60.8 kg (134 lb)   04/05/18 60.8 kg (134 lb)              Today, you had the following     No orders found for display       Primary Care Provider Office Phone # Fax #    Rita Krause PA-C [phone] [address] [place] [address]        Equal Access to Services     [facility]: Hadii aad ku hadasho Soomaali, waaxda luqadaha, qaybta kaalmada adewandayashira, adeline rankin . So [facility] [phone].    ATENCIÓN: Si habla español, tiene a garcia disposición servicios gratuitos de asistencia lingüística. [facility] [phone].    We comply with applicable federal civil rights laws and [place] laws. We do not discriminate on the basis of race, color, national origin, age, disability, sex, sexual orientation, or gender identity.            Thank you!     Thank you for choosing [facility]  for your care. Our goal is always to provide you with excellent care. Hearing back from our patients is one way we can continue to improve our services. Please take a few minutes to complete the written survey that you may receive in the mail after your visit with us. Thank you!             Your  Updated Medication List - Protect others around you: Learn how to safely use, store and throw away your medicines at www.disposemymeds.org.          This list is accurate as of 4/9/18 12:05 PM.  Always use your most recent med list.                   Brand Name Dispense Instructions for use Diagnosis    ACETAMINOPHEN PO      Take 1,000 mg by mouth every 6 hours as needed for pain        YANET-SELTZER PLUS COLD & FLU PO           amoxicillin 875 MG tablet    AMOXIL    20 tablet    Take 1 tablet (875 mg) by mouth 2 times daily for 10 days    Pineal germ cell tumor (H), Viral sinusitis       diphenhydrAMINE 25 MG tablet    BENADRYL    10 tablet    Take 50mg ONE HOUR PRIOR to your MRI and then every 6 hours for two doses following your scan and then as directed    Pineal tumor       guaiFENesin 600 MG 12 hr tablet    MUCINEX    40 tablet    Take 2 tablets (1,200 mg) by mouth 2 times daily as needed for congestion    Viral URI with cough       ketorolac 10 MG tablet    TORADOL    20 tablet    Take 1 tablet (10 mg) by mouth every 6 hours as needed        VITAMIN C PO

## 2018-04-09 NOTE — PROGRESS NOTES
SUBJECTIVE:   Steven Romeo is a 22 year old male with a complicated past medical history of pineal germ cell brain tumor in 2013 who presents to clinic today for the following health issues:      Samir DEVRIESJOSE -was evaluated in the clinic with a 24-hour long illness that was likely viral.  Had a cough at the time as well.  He is a smoker.  Was noticing a lot of thick green sputum at that time.  Did order Amoxil and Mucinex-but did suggest to them that this likely is viral and will just need time.  Today states that he was unable to  the Mucinex due to cost as this was not covered by his insurance.  He is using an over-the-counter cough suppressant.  He called today with the following concerns: increased SOB with activity, increased anterior chest tightness, increased congestion. Sputum now yellow. Feels sweaty. No new fevers.    Drinking fluids ok.     Also questions getting set up with a therapist here.  His neurologist at the [facility] suggested neuropsych testing due to severe debilitating nightmares he had been having.  In looking back in his chart in September he had a full neuropsych eval with diagnoses of FREDA, almost daily panic attacks, nightmares, sleep disturbance, and possible ADHD which seemed to flare if his anxiety was heightened.  He has not been on any medications.  Was told that he should see a psychologist on a consistent basis, they were going to help arrange this locally, that this never happened.        Problem list and histories reviewed & adjusted, as indicated.  Additional history: as documented    Patient Active Problem List   Diagnosis     Generalized anxiety disorder     Depression, major, in remission (H)     Tobacco abuse     Hydrocephalus     Pineal germ cell tumor (H)     Health Care Home     Status post chemotherapy     Callus of foot-right     Nightmares     RFEDA (generalized anxiety disorder)     Panic attacks     PTSD (post-traumatic stress disorder)     Past  Surgical History:   Procedure Laterality Date     INSERT CHEST TUBE Left 11/10/2014    Procedure: INSERT CHEST TUBE;  Surgeon: Damaso Westbrook MD;  [facility]     REMOVE PORT VASCULAR ACCESS Right 10/12/2015    Procedure: REMOVE PORT VASCULAR ACCESS;  Surgeon: Renato Arroyo MD;  [facility]      THORACOSCOPIC BLEBECTOMY Left 11/13/2014    Procedure: THORACOSCOPIC BLEBECTOMY;  Surgeon: Damaso Westbrook MD;  [facility]     THORACOSCOPIC PLEURODESIS Left 11/13/2014    Procedure: THORACOSCOPIC PLEURODESIS;  Surgeon: Damaso Westbrook MD;  [facility]     VENTRICULOSCOPY Right 3/25/2015    Procedure: VENTRICULOSCOPY;  Surgeon: Vasile Boyd MD;  [facility]       Social History   Substance Use Topics     Smoking status: Current Every Day Smoker     Packs/day: 0.25     Years: 1.00     Types: Cigarettes     Last attempt to quit: 12/9/2014     Smokeless tobacco: Never Used      Comment: Mom smokes outside.     Alcohol use 0.0 oz/week     0 Standard drinks or equivalent per week      Comment: occasionally     Family History   Problem Relation Age of Onset     Family History Negative Other            Reviewed and updated as needed this visit by clinical staff       Reviewed and updated as needed this visit by Provider         ROS:  Constitutional, HEENT, cardiovascular, pulmonary, gi and gu systems are negative, except as otherwise noted.    OBJECTIVE:     /72  Pulse 88  Temp 96.8  F (36  C) (Temporal)  Resp 18  Wt 134 lb (60.8 kg)  SpO2 98%  BMI 20.64 kg/m2  Body mass index is 20.64 kg/(m^2).   GENERAL: alert, no distress, fatigued and somewhat frail in appearance.  HENT: ear canals and TM's normal, nose and mouth without ulcers or lesions  NECK: no adenopathy, no asymmetry, masses, or scars and thyroid normal to palpation  RESP: lungs clear to auscultation - no rales, rhonchi or wheezes  CV: regular rate and rhythm, normal S1 S2, no S3 or S4, no murmur,  click or rub, no peripheral edema and peripheral pulses strong  MS: no gross musculoskeletal defects noted, no edema  SKIN: no suspicious lesions or rashes  PSYCH: mentation appears normal, affect normal/bright - as he typically has been through the years in office visits. Somewhat subdued, sometimes flat depending on conversation.      Diagnostic Test Results:  CXR -   FINDINGS: Again there are postsurgical changes in the left upper lobe.             Impression             IMPRESSION:  No acute consolidation or significant change otherwise.          ASSESSMENT:      Viral URI with cough  SOB (shortness of breath) on exertion  Nightmares  FREDA (generalized anxiety disorder)  Panic attacks  PTSD (post-traumatic stress disorder)      PLAN:       ICD-10-CM    1. Viral URI with cough J06.9 XR Chest 2 Views    B97.89    2. SOB (shortness of breath) on exertion R06.02 XR Chest 2 Views   3. Nightmares F51.5    4. FREDA (generalized anxiety disorder) F41.1    5. Panic attacks F41.0    6. PTSD (post-traumatic stress disorder) F43.10    7. Moderate recurrent major depression (H) F33.1            MEDICATIONS:  Continue current medications without change-minded him that this is likely a viral illness and will take 10-14 days at least to completely clear.  He should consider stopping smoking.  Wrote him a note for work-she will be out until Thursday.  CONSULTATION/REFERRAL to [facility] here in [place].    Requested that Tiffanie Kasia come and talk with the patient post-visit with me.  I pointed out [facility]  She will be apparently seen him tomorrow for a full evaluation-did meet with he and his girlfriend today briefly.  I will hold on medication management until she has a chance to evaluate him..     Rita Krause PA-C  [facility]        Orders Placed This Encounter     XR Chest 2 Views       AVS given to patient upon discharge today.  Electronically signed by Rita  Nelida DUFFY  April 9, 2018  11:20 AM

## 2018-04-09 NOTE — PROGRESS NOTES
"HPI:  Steven Romeo is a 22 year old male who is seen in consultation at the request of Rita Krause PA-C.    Pt presents for eval of:   (Onset, Location, L/R, Character, Treatments, Injury if yes)    2015 chemo and radiation     Onset Fall 2017, plantar Right 1st met callus/plantar wart.  Constant, dull ache, sharp and throbbing w/pressure, \"feels like stepping on a small rock\", pain 2  OTC treatments for wart, clips away w/toenail clipper    Works at [employer].    BMI is normal.    Review of Systems:  Patient denies fever, chills, rash, wound, stiffness, limping, numbness, weakness, heart burn, blood in stool, chest pain with activity, calf pain when walking, shortness of breath with activity, chronic cough, easy bleeding/bruising, swelling of ankles, excessive thirst, fatigue, depression, anxiety.       PAST MEDICAL HISTORY:   Past Medical History:   Diagnosis Date     Cancer (H)      Hydrocephalus 2015    See MRI/CT     Infectious mononucleosis     age 7     Other chronic pain     headaches     Pineal tumor 2015    see MRI/CT     Pneumonia 9/13/2011     Pneumothorax on left 11/2014    spontaneous     Spontaneous pneumothorax 11/9/2014        PAST SURGICAL HISTORY:   Past Surgical History:   Procedure Laterality Date     INSERT CHEST TUBE Left 11/10/2014    Procedure: INSERT CHEST TUBE;  Surgeon: Damaso Westbrook MD;  [facility]     REMOVE PORT VASCULAR ACCESS Right 10/12/2015    Procedure: REMOVE PORT VASCULAR ACCESS;  Surgeon: Renato Arroyo MD;  [facility]      THORACOSCOPIC BLEBECTOMY Left 11/13/2014    Procedure: THORACOSCOPIC BLEBECTOMY;  Surgeon: Damaso Westbrook MD;  [facility]     THORACOSCOPIC PLEURODESIS Left 11/13/2014    Procedure: THORACOSCOPIC PLEURODESIS;  Surgeon: Damaso Westbrook MD;  [facility]     VENTRICULOSCOPY Right 3/25/2015    Procedure: VENTRICULOSCOPY;  Surgeon: Vasile Boyd MD;  [facility]        MEDICATIONS: "   Current Outpatient Prescriptions:      guaiFENesin (MUCINEX) 600 MG 12 hr tablet, Take 2 tablets (1,200 mg) by mouth 2 times daily as needed for congestion, Disp: 40 tablet, Rfl: 0     amoxicillin (AMOXIL) 875 MG tablet, Take 1 tablet (875 mg) by mouth 2 times daily for 10 days, Disp: 20 tablet, Rfl: 0     ketorolac (TORADOL) 10 MG tablet, Take 1 tablet (10 mg) by mouth every 6 hours as needed (Patient not taking: Reported on 4/5/2018), Disp: 20 tablet, Rfl: 0     ACETAMINOPHEN PO, Take 1,000 mg by mouth every 6 hours as needed for pain, Disp: , Rfl:      diphenhydrAMINE (BENADRYL) 25 MG tablet, Take 50mg ONE HOUR PRIOR to your MRI and then every 6 hours for two doses following your scan and then as directed (Patient not taking: Reported on 4/5/2018), Disp: 10 tablet, Rfl: 0     Ascorbic Acid (VITAMIN C PO), , Disp: , Rfl:      Cisrqnssr-ZIS-RA-APAP (YANET-SELTZER PLUS COLD & FLU PO), , Disp: , Rfl:      ALLERGIES:    Allergies   Allergen Reactions     Gadolinium Itching     Redness/Itching immediately with Gadolinium injection.  Patient reacts even with premedication.  [facility] 4-1--2016     Neupogen [Filgrastim]         SOCIAL HISTORY:   Social History     Social History     Marital status: Single     Spouse name: N/A     Number of children: N/A     Years of education: N/A     Occupational History     Not on file.     Social History Main Topics     Smoking status: Current Every Day Smoker     Packs/day: 0.25     Years: 1.00     Types: Cigarettes     Last attempt to quit: 12/9/2014     Smokeless tobacco: Never Used      Comment: Mom smokes outside.     Alcohol use 0.0 oz/week     0 Standard drinks or equivalent per week      Comment: occasionally     Drug use: No     Sexual activity: Yes     Partners: Female     Other Topics Concern     Not on file     Social History Narrative    Currently staying in the [place] with mom. Otherwise living in [place]. Home is in [place]. He states that he lost all of his friends  "when he got cancer as they were afraid they might get it. He denies any illicite drugs but states when this is all done he is going to celebrate with a beer.         FAMILY HISTORY:   Family History   Problem Relation Age of Onset     Family History Negative Other         EXAM:Vitals: /72 (BP Location: Left arm, Cuff Size: Adult Regular)  Temp 96.8  F (36  C) (Temporal)  Ht 5' 7.56\" (1.716 m)  Wt 134 lb (60.8 kg)  BMI 20.64 kg/m2  BMI= Body mass index is 20.64 kg/(m^2).    General appearance: Patient is alert and fully cooperative with history & exam.  No sign of distress is noted during the visit.     Psychiatric: Affect is pleasant & appropriate.  Patient appears motivated to improve health.     Respiratory: Breathing is regular & unlabored while sitting.     HEENT: Hearing is intact to spoken word.  Speech is clear.  No gross evidence of visual impairment that would impact ambulation.     Vascular: DP & PT pulses are intact & regular bilaterally.  No significant edema or varicosities noted.  CFT and skin temperature is normal to both lower extremities.     Neurologic: Lower extremity sensation is intact to light touch.  No evidence of weakness or contracture in the lower extremities.  No evidence of neuropathy.    Dermatologic: Skin is intact to both lower extremities with normal texture, turgor and tone.  A nucleated hyperkeratotic mass is noted about the right plantar medial first metatarsal head.  Pinpoint bleeding noted upon debridement.  Loss of skin lines noted.  Exquisitely uncomfortable for him with palpation even light palpation.    Musculoskeletal: Patient is ambulatory without assistive device or brace.  No gross ankle deformity noted.  No foot or ankle joint effusion is noted.      ASSESSMENT:       ICD-10-CM    1. Verruca plantaris B07.0         PLAN:  Reviewed patient's chart in epic.  Discussed treatment options. Treatment options include multiple applications of freezing, cantharidin, " salicylic acid with occlusive dressing, prescription antiviral topicals, or surgical excision or ablation.     After discussing these options and potential outcomes and post op care the patient wishes to pursue topical treatments.  I pared the lesion/s to a bleeding base and sent was instructed to apply over-the-counter topical liquid wart remover under occlusive dressing 24 hours per day 7 days per week as tolerated. Follow up with me in 2-3 weeks for debridement for comfort, reevaluation and further treatment if needed.  I explained this may take multiple applications to be of benefit. Topical treatments must remain consistent 24/7 until resolved, discontinuing treatment for a short period of time will eliminate efficacy. All questions were answered to their satisfaction. Written instructions were dispensed.    We discussed surgical excision as well however this would require up to 1 month of nonweightbearing.  He would like to try again with topical treatments first.    Ronaldo Golden DPM

## 2018-04-09 NOTE — PROGRESS NOTES
Is call this patient if he is not available, he approved talking with his mother about the results.  Please let them know that he has no signs of any bronchial or pneumonia type infections.  He should continue the medications that I started last week.  Likely this is a viral infection as he and I talked about at both this visit and last week's visit and will need time management to improve.

## 2018-04-09 NOTE — NURSING NOTE
"Chief Complaint   Patient presents with     Consult     Right foot callus/wart; new pt       Initial /72 (BP Location: Left arm, Cuff Size: Adult Regular)  Temp 96.8  F (36  C) (Temporal)  Ht 5' 7.56\" (1.716 m)  Wt 134 lb (60.8 kg)  BMI 20.64 kg/m2 Estimated body mass index is 20.64 kg/(m^2) as calculated from the following:    Height as of this encounter: 5' 7.56\" (1.716 m).    Weight as of this encounter: 134 lb (60.8 kg).  BP completed using cuff size: regular  Medication Reconciliation: complete    Amanda Omalley CMA, April 9, 2018    "

## 2018-04-09 NOTE — LETTER
"    4/9/2018         RE: Steven Romeo  [address] [facility] [address]        Dear Colleague,    Thank you for referring your patient, Steven Romeo, to the [facility]. Please see a copy of my visit note below.    HPI:  Steven Romeo is a 22 year old male who is seen in consultation at the request of Rita Krause PA-C.    Pt presents for eval of:   (Onset, Location, L/R, Character, Treatments, Injury if yes)    2015 chemo and radiation     Onset Fall 2017, plantar Right 1st met callus/plantar wart.  Constant, dull ache, sharp and throbbing w/pressure, \"feels like stepping on a small rock\", pain 2  OTC treatments for wart, clips away w/toenail clipper    Works at [employer].    BMI is normal.    Review of Systems:  Patient denies fever, chills, rash, wound, stiffness, limping, numbness, weakness, heart burn, blood in stool, chest pain with activity, calf pain when walking, shortness of breath with activity, chronic cough, easy bleeding/bruising, swelling of ankles, excessive thirst, fatigue, depression, anxiety.       PAST MEDICAL HISTORY:   Past Medical History:   Diagnosis Date     Cancer (H)      Hydrocephalus 2015    See MRI/CT     Infectious mononucleosis     age 7     Other chronic pain     headaches     Pineal tumor 2015    see MRI/CT     Pneumonia 9/13/2011     Pneumothorax on left 11/2014    spontaneous     Spontaneous pneumothorax 11/9/2014        PAST SURGICAL HISTORY:   Past Surgical History:   Procedure Laterality Date     INSERT CHEST TUBE Left 11/10/2014    Procedure: INSERT CHEST TUBE;  Surgeon: Damaso Westbrook MD;  Location: [facility] OR     REMOVE PORT VASCULAR ACCESS Right 10/12/2015    Procedure: REMOVE PORT VASCULAR ACCESS;  Surgeon: Renato Arroyo MD;  Location: [facility] PED[facility] SED[facility]      THORACOSCOPIC BLEBECTOMY Left 11/13/2014    Procedure: THORACOSCOPIC BLEBECTOMY;  Surgeon: Damaso Westbrook MD;  Location: [facility] OR     THORACOSCOPIC PLEURODESIS Left " 11/13/2014    Procedure: THORACOSCOPIC PLEURODESIS;  Surgeon: Damaso Westbrook MD;  Location:  OR     VENTRICULOSCOPY Right 3/25/2015    Procedure: VENTRICULOSCOPY;  Surgeon: Vasile Boyd MD;  [facility] OR        MEDICATIONS:   Current Outpatient Prescriptions:      guaiFENesin (MUCINEX) 600 MG 12 hr tablet, Take 2 tablets (1,200 mg) by mouth 2 times daily as needed for congestion, Disp: 40 tablet, Rfl: 0     amoxicillin (AMOXIL) 875 MG tablet, Take 1 tablet (875 mg) by mouth 2 times daily for 10 days, Disp: 20 tablet, Rfl: 0     ketorolac (TORADOL) 10 MG tablet, Take 1 tablet (10 mg) by mouth every 6 hours as needed (Patient not taking: Reported on 4/5/2018), Disp: 20 tablet, Rfl: 0     ACETAMINOPHEN PO, Take 1,000 mg by mouth every 6 hours as needed for pain, Disp: , Rfl:      diphenhydrAMINE (BENADRYL) 25 MG tablet, Take 50mg ONE HOUR PRIOR to your MRI and then every 6 hours for two doses following your scan and then as directed (Patient not taking: Reported on 4/5/2018), Disp: 10 tablet, Rfl: 0     Ascorbic Acid (VITAMIN C PO), , Disp: , Rfl:      [id number] (YANET-SELTZER PLUS COLD & FLU PO), , Disp: , Rfl:      ALLERGIES:    Allergies   Allergen Reactions     Gadolinium Itching     Redness/Itching immediately with Gadolinium injection.  Patient reacts even with premedication.  [facility] 4-1--2016     Neupogen [Filgrastim]         SOCIAL HISTORY:   Social History     Social History     Marital status: Single     Spouse name: N/A     Number of children: N/A     Years of education: N/A     Occupational History     Not on file.     Social History Main Topics     Smoking status: Current Every Day Smoker     Packs/day: 0.25     Years: 1.00     Types: Cigarettes     Last attempt to quit: 12/9/2014     Smokeless tobacco: Never Used      Comment: Mom smokes outside.     Alcohol use 0.0 oz/week     0 Standard drinks or equivalent per week      Comment: occasionally     Drug use: No     Sexual  "activity: Yes     Partners: Female     Other Topics Concern     Not on file     Social History Narrative    Currently staying in the [place] with mom. Otherwise living in [place]. Home is in [place]. He states that he lost all of his friends when he got cancer as they were afraid they might get it. He denies any illicite drugs but states when this is all done he is going to celebrate with a beer.         FAMILY HISTORY:   Family History   Problem Relation Age of Onset     Family History Negative Other         EXAM:Vitals: /72 (BP Location: Left arm, Cuff Size: Adult Regular)  Temp 96.8  F (36  C) (Temporal)  Ht 5' 7.56\" (1.716 m)  Wt 134 lb (60.8 kg)  BMI 20.64 kg/m2  BMI= Body mass index is 20.64 kg/(m^2).    General appearance: Patient is alert and fully cooperative with history & exam.  No sign of distress is noted during the visit.     Psychiatric: Affect is pleasant & appropriate.  Patient appears motivated to improve health.     Respiratory: Breathing is regular & unlabored while sitting.     HEENT: Hearing is intact to spoken word.  Speech is clear.  No gross evidence of visual impairment that would impact ambulation.     Vascular: DP & PT pulses are intact & regular bilaterally.  No significant edema or varicosities noted.  CFT and skin temperature is normal to both lower extremities.     Neurologic: Lower extremity sensation is intact to light touch.  No evidence of weakness or contracture in the lower extremities.  No evidence of neuropathy.    Dermatologic: Skin is intact to both lower extremities with normal texture, turgor and tone.  A nucleated hyperkeratotic mass is noted about the right plantar medial first metatarsal head.  Pinpoint bleeding noted upon debridement.  Loss of skin lines noted.  Exquisitely uncomfortable for him with palpation even light palpation.    Musculoskeletal: Patient is ambulatory without assistive device or brace.  No gross ankle deformity noted.  No foot or " ankle joint effusion is noted.      ASSESSMENT:       ICD-10-CM    1. Verruca plantaris B07.0         PLAN:  Reviewed patient's chart in epic.  Discussed treatment options. Treatment options include multiple applications of freezing, cantharidin, salicylic acid with occlusive dressing, prescription antiviral topicals, or surgical excision or ablation.     After discussing these options and potential outcomes and post op care the patient wishes to pursue topical treatments.  I pared the lesion/s to a bleeding base and sent was instructed to apply over-the-counter topical liquid wart remover under occlusive dressing 24 hours per day 7 days per week as tolerated. Follow up with me in 2-3 weeks for debridement for comfort, reevaluation and further treatment if needed.  I explained this may take multiple applications to be of benefit. Topical treatments must remain consistent 24/7 until resolved, discontinuing treatment for a short period of time will eliminate efficacy. All questions were answered to their satisfaction. Written instructions were dispensed.    We discussed surgical excision as well however this would require up to 1 month of nonweightbearing.  He would like to try again with topical treatments first.    Ronaldo Golden DPM            Again, thank you for allowing me to participate in the care of your patient.        Sincerely,        Ronaldo Golden DPM

## 2018-04-09 NOTE — PROGRESS NOTES
Warm-handoff    [facility] met with patient, by PCP request. [facility] informed and explained integrated health model, use of brief therapy interventions, as well as referrals and support services for ongoing long-term therapy.  Patient was with his SO. He states that he has anxiety and ptsd from medical trauma. He states that he will have panic symptoms and has frequent sweating. He had a brain tumor that was removed three years ago and he has had multiple surgeries for that and other medical conditions. Patient reports having daily nightmares and difficulty with sleep. He would like to start counseling and is also nervous about starting that as well. Patient scheduled an initial visit with this writer for tomorrow. He may eventually transition to Bray[facility] Barksdale to discuss EMDR.

## 2018-04-09 NOTE — LETTER
[address]   [address]  Tel. [phone] / Fax [phone]    4/9/2018          Steven Romeo  [address] [facility] [address]          TO WHOM IT MAY CONCERN:    Steven was seen in the clinic today for illness.  He continues to be ill and should avoid work.  He also had a procedure one on his foot and should be resting and elevating the foot for the next few days.  He should stay home and rest the next few days to recover.  He may return on Monday April 12th.     Sincerely,        Rita Krause PA-C

## 2018-04-09 NOTE — NURSING NOTE
"Chief Complaint   Patient presents with     URI     recheck        Initial /72  Pulse 88  Temp 96.8  F (36  C) (Temporal)  Resp 18  Wt 134 lb (60.8 kg)  SpO2 98%  BMI 20.64 kg/m2 Estimated body mass index is 20.64 kg/(m^2) as calculated from the following:    Height as of an earlier encounter on 4/9/18: 5' 7.56\" (1.716 m).    Weight as of this encounter: 134 lb (60.8 kg).  BP completed using cuff size: shailesh Quintanilla MA      "

## 2018-04-09 NOTE — MR AVS SNAPSHOT
After Visit Summary   4/9/2018    Steven Romeo    MRN: [id number]           Patient Information     Date Of Birth          1995        Visit Information        Provider Department      4/9/2018 11:00 AM Rita Krause PA-C [facility]        Today's Diagnoses     Viral URI with cough    -  1    SOB (shortness of breath) on exertion        Nightmares        FREDA (generalized anxiety disorder)        Panic attacks        PTSD (post-traumatic stress disorder)        Moderate recurrent major depression (H)           Follow-ups after your visit        Your next 10 appointments already scheduled     Apr 10, 2018  2:30 PM CDT   [facility] ALVINA Brian   [facility]    [address] [place] [address] [id number] [phone]            Apr 19, 2018 10:30 AM CDT   New Visit with Ronaldo Golden DPM   [facility]    [address] [place] [address] [phone] [phone]            Apr 30, 2018  9:45 AM CDT   Return Visit with Ronaldo Golden DPM   [facility]    [address] [place] [address] [id number] [phone]              Future tests that were ordered for you today     Open Future Orders        Priority Expected Expires Ordered    XR Chest 2 Views Routine 4/9/2018 4/9/2019 4/9/2018            Who to contact     If you have questions or need follow up information about today's clinic visit or your schedule please contact [facility] directly at [phone].  Normal or non-critical lab and imaging results will be communicated to you by MyChart, letter or phone within 4 business days after the clinic has received the results. If you do not hear from us within 7 days, please contact the clinic through MyChart or phone. If you have a critical or abnormal lab result, we will notify you by phone as soon as  possible.  Submit refill requests through [employer] or call your pharmacy and they will forward the refill request to us. Please allow 3 business days for your refill to be completed.          Additional Information About Your Visit        [employer] Information     [employer] gives you secure access to your electronic health record. If you see a primary care provider, you can also send messages to your care team and make appointments. If you have questions, please call your primary care clinic.  If you do not have a primary care provider, please call [phone] and they will assist you.        Care EveryWhere ID     This is your Care EveryWhere ID. This could be used by other organizations to access your [facility] medical records  [id number]        Your Vitals Were     Pulse Temperature Respirations Pulse Oximetry BMI (Body Mass Index)       88 96.8  F (36  C) (Temporal) 18 98% 20.64 kg/m2        Blood Pressure from Last 3 Encounters:   04/09/18 110/72   04/09/18 110/72   04/05/18 110/66    Weight from Last 3 Encounters:   04/09/18 134 lb (60.8 kg)   04/09/18 134 lb (60.8 kg)   04/05/18 134 lb (60.8 kg)               Primary Care Provider Office Phone # Fax #    Rita Krause PA-C [phone] [address] [place] [address]        Equal Access to Services     GEMMA JOHNS AH: Hadii aad ku hadasho Soomaali, waaxda luqadaha, qaybta kaalmada adeegyada, waxalex jamison. So [facility] [phone].    ATENCIÓN: Si habla español, tiene a garcia disposición servicios gratuitos de asistencia lingüística. Llame al [phone].    We comply with applicable federal civil rights laws and [place] laws. We do not discriminate on the basis of race, color, national origin, age, disability, sex, sexual orientation, or gender identity.            Thank you!     Thank you for choosing [facility]  for your care. Our goal is always to provide you with excellent care. Hearing  back from our patients is one way we can continue to improve our services. Please take a few minutes to complete the written survey that you may receive in the mail after your visit with us. Thank you!             Your Updated Medication List - Protect others around you: Learn how to safely use, store and throw away your medicines at www.disposemymeds.org.          This list is accurate as of 4/9/18  2:40 PM.  Always use your most recent med list.                   Brand Name Dispense Instructions for use Diagnosis    ACETAMINOPHEN PO      Take 1,000 mg by mouth every 6 hours as needed for pain        YANET-SELTZER PLUS COLD & FLU PO           amoxicillin 875 MG tablet    AMOXIL    20 tablet    Take 1 tablet (875 mg) by mouth 2 times daily for 10 days    Pineal germ cell tumor (H), Viral sinusitis       diphenhydrAMINE 25 MG tablet    BENADRYL    10 tablet    Take 50mg ONE HOUR PRIOR to your MRI and then every 6 hours for two doses following your scan and then as directed    Pineal tumor       guaiFENesin 600 MG 12 hr tablet    MUCINEX    40 tablet    Take 2 tablets (1,200 mg) by mouth 2 times daily as needed for congestion    Viral URI with cough       ketorolac 10 MG tablet    TORADOL    20 tablet    Take 1 tablet (10 mg) by mouth every 6 hours as needed        VITAMIN C PO

## 2018-04-30 ENCOUNTER — OFFICE VISIT (OUTPATIENT)
Dept: PODIATRY | Facility: CLINIC | Age: 23
End: 2018-04-30
Payer: COMMERCIAL

## 2018-04-30 VITALS
DIASTOLIC BLOOD PRESSURE: 70 MMHG | WEIGHT: 135 LBS | SYSTOLIC BLOOD PRESSURE: 122 MMHG | HEIGHT: 68 IN | TEMPERATURE: 96.8 F | BODY MASS INDEX: 20.46 KG/M2

## 2018-04-30 DIAGNOSIS — B07.0 VERRUCA PLANTARIS: Primary | ICD-10-CM

## 2018-04-30 PROCEDURE — 99213 OFFICE O/P EST LOW 20 MIN: CPT | Performed by: PODIATRIST

## 2018-04-30 ASSESSMENT — PAIN SCALES - GENERAL: PAINLEVEL: MODERATE PAIN (4)

## 2018-04-30 NOTE — PATIENT INSTRUCTIONS
SMOKING CESSATION  What's in cigarette smoke? - Cigarette smoke contains over 4,000 chemicals. Nicotine is one of the main ingredients which is an insecticide/herbicide. It is poisonous to our nervous system, increases blood clotting risk, and decreases the body's defenses to fight off infection. Another chemical is Carbon Monoxide is an asphyxiating gas that permanently binds to blood cells and blocks the transport of oxygen. This leads to tissue death and decreases your metabolism. Tar is a chemical that coats your lungs and trachea which impairs new oxygen coming in and carbon dioxide getting out of your body.   How does smoking impact surgery? - Smoking is particularly hazardous with regards to surgery. Surgery puts stress on the body and a smoker's body is already under strain from these chemicals. Putting the two together, especially for an elective surgery, could be a recipe for disaster. Smoking before and after surgery increases your risk of heart problems, slow wound healing, delayed bone healing, blood clots, wound infection and anesthesia complications.   What are the benefits of quitting? - In 20 minutes your blood pressure will drop back down to normal. In 8 hours the carbon monoxide (a toxic gas) levels in your blood stream will drop by half, and oxygen levels will return to normal. In 48 hours your chance of having a heart attack will have decreased. All nicotine will have left your body. Your sense of taste and smell will return to a normal level. In 72 hours your bronchial tubes will relax, and your energy levels will increase. In 2 weeks your circulation will increase, and it will continue to improve for the next 10 weeks.    Recommendations for elective surgery - Ideally, patients should quit smoking 8 weeks before and at least 2 weeks after elective surgery in order to avoid complications. Simply cutting back on the amount of cigarettes smoked per day does not offer any benefit or decrease the  risk of poor wound healing, heart problems, and infection. Smokers should also start taking Vitamin C and B for two weeks before surgery and two weeks after surgery.    Ways to Stop Smokin. Nicotine patches, lozenges, or gum  2. Support Groups  3. Medications (see below)    List of Medications:  1. Varenicline Tartrate (CHANTIX)   2. Bupropion HCL (WELLBUTRIN, ZYBAN) - note: make sure Wellbutrin is for smoking cessation and not other issues   3. Nicotine Patch (NICODERM)   4. Nicotine Inhaler (NICOTROL)   5. Nicotine Gum Nicotine Polacrilex   6. Nicotine Lozenge: Nicotine Polacrilex (COMMIT)   * [place] offers a smoking support group as well!  Please visit: [email or web address]  If you are interested in these, ask about getting a prescription or talk to your primary care doctor about what may be the best way for you to quit.       Wart treatment    1.  After bathing or soaking for 5 minutes, remove any loose or dead skin with a fingernail clipper, pumice stone, emery board, or 120 grit sandpaper.      2.  Place one drop of salicylic acid liquid or a patch on each wart. Try to apply this medication about 2 mm beyond the lesion and try not to get the medicine on the surrounding skin.     3.  Cover the treated area with strong tape to keep the medicine in place. You can use athletic tape, a Band-Aid, or duct tape.  Keep this in place all day.  If you skip a couple days, you will lose the benefit of the previous several days of treatment.     4.  Every few days remove the dead skin.  Reapply the salicylic acid each day, keeping it on place all day.     5.  If you have severe or intolerable skin irritation, skip a day between treatments. Then start treatment again. However, skipping treatments will slow down how quickly warts will go away.    6.  To permanently get rid of plantar warts will require 3-12 weeks of constant treatment and irritation.      7.  To move treatment along faster, but also  more painful, you may apply over the counter liquid nitrogen to the wart once weekly and do not apply salicylic acid that day, then resume as above.  If a blister forms 1-2 days after the liquid nitrogen application, pop the blister and apply a bandaid. Then reapply the salicylic acid the next day.     8.  Follow up as instructed.  If you develop complications, you must be seen in clinic to evaluate and treat the complication.

## 2018-04-30 NOTE — NURSING NOTE
"Chief Complaint   Patient presents with     RECHECK     Right foot wart, salicylic acid ; LOV 4/9/2018       Initial /70 (BP Location: Left arm, Cuff Size: Adult Regular)  Temp 96.8  F (36  C) (Temporal)  Ht 5' 7.56\" (1.716 m)  Wt 135 lb (61.2 kg)  BMI 20.79 kg/m2 Estimated body mass index is 20.79 kg/(m^2) as calculated from the following:    Height as of this encounter: 5' 7.56\" (1.716 m).    Weight as of this encounter: 135 lb (61.2 kg).  BP completed using cuff size: regular  Medication Reconciliation: complete    Amanda Omalley CMA, April 30, 2018  "

## 2018-04-30 NOTE — PROGRESS NOTES
"Chief Complaint   Patient presents with     RECHECK     Right foot wart, salicylic acid ; LOV 4/9/2018     BMI is normal.    HPI:  Steven Romeo is a 22 year old male who is seen in consultation at the request of Rita Krause PA-C.    Pt presents for eval of:   (Onset, Location, L/R, Character, Treatments, Injury if yes)    2015 chemo and radiation     Onset Fall 2017, plantar Right 1st met callus/plantar wart.  Constant, dull ache, sharp and throbbing w/pressure, \"feels like stepping on a small rock\", pain 2  OTC treatments for wart, clips away w/toenail clipper    Works at [employer].    EXAM:Vitals: /70 (BP Location: Left arm, Cuff Size: Adult Regular)  Temp 96.8  F (36  C) (Temporal)  Ht 5' 7.56\" (1.716 m)  Wt 135 lb (61.2 kg)  BMI 20.79 kg/m2  BMI= Body mass index is 20.79 kg/(m^2).    General appearance: Patient is alert and fully cooperative with history & exam.  No sign of distress is noted during the visit.     Psychiatric: Affect is pleasant & appropriate.  Patient appears motivated to improve health.     Respiratory: Breathing is regular & unlabored while sitting.     HEENT: Hearing is intact to spoken word.  Speech is clear.  No gross evidence of visual impairment that would impact ambulation.     Vascular: DP & PT pulses are intact & regular bilaterally.  No significant edema or varicosities noted.  CFT and skin temperature is normal to both lower extremities.     Neurologic: Lower extremity sensation is intact to light touch.  No evidence of weakness or contracture in the lower extremities.  No evidence of neuropathy.    Dermatologic: Skin is intact to both lower extremities with normal texture, turgor and tone.  A 2 cm nucleated hyperkeratotic mass is noted about the right plantar medial first metatarsal head.  Pinpoint bleeding noted upon debridement.  Loss of skin lines noted.  Exquisitely uncomfortable for him with palpation even light palpation.    Musculoskeletal: Patient is " ambulatory without assistive device or brace.  No gross ankle deformity noted.  No foot or ankle joint effusion is noted.      ASSESSMENT:       ICD-10-CM    1. Verruca plantaris B07.0         PLAN:    4/9/18  Reviewed patient's chart in epic.  Discussed treatment options. Treatment options include multiple applications of freezing, cantharidin, salicylic acid with occlusive dressing, prescription antiviral topicals, or surgical excision or ablation.     After discussing these options and potential outcomes and post op care the patient wishes to pursue topical treatments.  I pared the lesion/s to a bleeding base and sent was instructed to apply over-the-counter topical liquid wart remover under occlusive dressing 24 hours per day 7 days per week as tolerated. Follow up with me in 2-3 weeks for debridement for comfort, reevaluation and further treatment if needed.  I explained this may take multiple applications to be of benefit. Topical treatments must remain consistent 24/7 until resolved, discontinuing treatment for a short period of time will eliminate efficacy. All questions were answered to their satisfaction. Written instructions were dispensed.    We discussed surgical excision as well however this would require up to 1 month of nonweightbearing.  He would like to try again with topical treatments first.    4/30/2018  I debrided a minimal amount macerated tissue   discussed options.  He would like to continue topical application of salicylic acid at home and home debridement  I offered liquid nitrogen today however this could require nonweightbearing if it blisters  No work restrictions at [employer] [facility] [employer] [facility] [employer] now  Offered surgical excision however this would require 2-4 weeks of nonweightbearing until the surgical burn recovers.     Ronaldo Golden DPM

## 2018-04-30 NOTE — MR AVS SNAPSHOT
After Visit Summary   4/30/2018    Steven Romeo    MRN: [id number]           Patient Information     Date Of Birth          1995        Visit Information        Provider Department      4/30/2018 9:45 AM Ronaldo Golden DPM [facility]        Today's Diagnoses     Verruca plantaris    -  1      Care Instructions    SMOKING CESSATION  What's in cigarette smoke? - Cigarette smoke contains over 4,000 chemicals. Nicotine is one of the main ingredients which is an insecticide/herbicide. It is poisonous to our nervous system, increases blood clotting risk, and decreases the body's defenses to fight off infection. Another chemical is Carbon Monoxide is an asphyxiating gas that permanently binds to blood cells and blocks the transport of oxygen. This leads to tissue death and decreases your metabolism. Tar is a chemical that coats your lungs and trachea which impairs new oxygen coming in and carbon dioxide getting out of your body.   How does smoking impact surgery? - Smoking is particularly hazardous with regards to surgery. Surgery puts stress on the body and a smoker's body is already under strain from these chemicals. Putting the two together, especially for an elective surgery, could be a recipe for disaster. Smoking before and after surgery increases your risk of heart problems, slow wound healing, delayed bone healing, blood clots, wound infection and anesthesia complications.   What are the benefits of quitting? - In 20 minutes your blood pressure will drop back down to normal. In 8 hours the carbon monoxide (a toxic gas) levels in your blood stream will drop by half, and oxygen levels will return to normal. In 48 hours your chance of having a heart attack will have decreased. All nicotine will have left your body. Your sense of taste and smell will return to a normal level. In 72 hours your bronchial tubes will relax, and your energy levels will increase. In 2 weeks your  circulation will increase, and it will continue to improve for the next 10 weeks.    Recommendations for elective surgery - Ideally, patients should quit smoking 8 weeks before and at least 2 weeks after elective surgery in order to avoid complications. Simply cutting back on the amount of cigarettes smoked per day does not offer any benefit or decrease the risk of poor wound healing, heart problems, and infection. Smokers should also start taking Vitamin C and B for two weeks before surgery and two weeks after surgery.    Ways to Stop Smokin. Nicotine patches, lozenges, or gum  2. Support Groups  3. Medications (see below)    List of Medications:  1. Varenicline Tartrate (CHANTIX)   2. Bupropion HCL (WELLBUTRIN, ZYBAN) - note: make sure Wellbutrin is for smoking cessation and not other issues   3. Nicotine Patch (NICODERM)   4. Nicotine Inhaler (NICOTROL)   5. Nicotine Gum Nicotine Polacrilex   6. Nicotine Lozenge: Nicotine Polacrilex (COMMIT)   * [facility] offers a smoking support group as well!  Please visit: [email or web address]  If you are interested in these, ask about getting a prescription or talk to your primary care doctor about what may be the best way for you to quit.       Wart treatment    1.  After bathing or soaking for 5 minutes, remove any loose or dead skin with a fingernail clipper, pumice stone, emery board, or 120 grit sandpaper.      2.  Place one drop of salicylic acid liquid or a patch on each wart. Try to apply this medication about 2 mm beyond the lesion and try not to get the medicine on the surrounding skin.     3.  Cover the treated area with strong tape to keep the medicine in place. You can use athletic tape, a Band-Aid, or duct tape.  Keep this in place all day.  If you skip a couple days, you will lose the benefit of the previous several days of treatment.     4.  Every few days remove the dead skin.  Reapply the salicylic acid each day, keeping it on place  all day.     5.  If you have severe or intolerable skin irritation, skip a day between treatments. Then start treatment again. However, skipping treatments will slow down how quickly warts will go away.    6.  To permanently get rid of plantar warts will require 3-12 weeks of constant treatment and irritation.      7.  To move treatment along faster, but also more painful, you may apply over the counter liquid nitrogen to the wart once weekly and do not apply salicylic acid that day, then resume as above.  If a blister forms 1-2 days after the liquid nitrogen application, pop the blister and apply a bandaid. Then reapply the salicylic acid the next day.     8.  Follow up as instructed.  If you develop complications, you must be seen in clinic to evaluate and treat the complication.              Follow-ups after your visit        Your next 10 appointments already scheduled     Jun 04, 2018  9:45 AM CDT   [facility] D[facility]    [address] [place] [id number] [phone]              Who to contact     If you have questions or need follow up information about today's clinic visit or your schedule please contact [facility] directly at [phone].  Normal or non-critical lab and imaging results will be communicated to you by [employer], letter or phone within 4 business days after the clinic has received the results. If you do not hear from us within 7 days, please contact the clinic through [employer] or phone. If you have a critical or abnormal lab result, we will notify you by phone as soon as possible.  Submit refill requests through [employer] or call your pharmacy and they will forward the refill request to us. Please allow 3 business days for your refill to be completed.          Additional Information About Your Visit        [employer] Information     [employer] gives you secure access to your electronic health  "record. If you see a primary care provider, you can also send messages to your care team and make appointments. If you have questions, please call your primary care clinic.  If you do not have a primary care provider, please call [phone] and they will assist you.        Care EveryWhere ID     This is your Care EveryWhere ID. This could be used by other organizations to access your [facility] medical records  [id number]        Your Vitals Were     Temperature Height BMI (Body Mass Index)             96.8  F (36  C) (Temporal) 5' 7.56\" (1.716 m) 20.79 kg/m2          Blood Pressure from Last 3 Encounters:   04/30/18 122/70   04/09/18 110/72   04/09/18 110/72    Weight from Last 3 Encounters:   04/30/18 135 lb (61.2 kg)   04/09/18 134 lb (60.8 kg)   04/09/18 134 lb (60.8 kg)              Today, you had the following     No orders found for display       Primary Care Provider Office Phone # Fax #    Rita Krause PA-C [phone] [address] [place] [address]        Equal Access to Services     MAXINE JOHNS AH: Hadii aad ku hadasho Soomaali, waaxda luqadaha, qaybta kaalmada adeegyada, adeline clinein hayemersonn aylin rankin ah. So [facility] [phone].    ATENCIÓN: Si habla español, tiene a garcia disposición servicios gratuitos de asistencia lingüística. Stephany al [phone].    We comply with applicable federal civil rights laws and [place] laws. We do not discriminate on the basis of race, color, national origin, age, disability, sex, sexual orientation, or gender identity.            Thank you!     Thank you for choosing [facility]  for your care. Our goal is always to provide you with excellent care. Hearing back from our patients is one way we can continue to improve our services. Please take a few minutes to complete the written survey that you may receive in the mail after your visit with us. Thank you!             Your Updated Medication List - Protect others around " you: Learn how to safely use, store and throw away your medicines at www.disposemymeds.org.          This list is accurate as of 4/30/18 10:05 AM.  Always use your most recent med list.                   Brand Name Dispense Instructions for use Diagnosis    ACETAMINOPHEN PO      Take 1,000 mg by mouth every 6 hours as needed for pain        YANET-SELTZER PLUS COLD & FLU PO           diphenhydrAMINE 25 MG tablet    BENADRYL    10 tablet    Take 50mg ONE HOUR PRIOR to your MRI and then every 6 hours for two doses following your scan and then as directed    Pineal tumor       guaiFENesin 600 MG 12 hr tablet    MUCINEX    40 tablet    Take 2 tablets (1,200 mg) by mouth 2 times daily as needed for congestion    Viral URI with cough       ketorolac 10 MG tablet    TORADOL    20 tablet    Take 1 tablet (10 mg) by mouth every 6 hours as needed        VITAMIN C PO

## 2018-06-04 ENCOUNTER — OFFICE VISIT (OUTPATIENT)
Dept: PODIATRY | Facility: CLINIC | Age: 23
End: 2018-06-04
Payer: COMMERCIAL

## 2018-06-04 VITALS
DIASTOLIC BLOOD PRESSURE: 64 MMHG | BODY MASS INDEX: 20.46 KG/M2 | WEIGHT: 135 LBS | HEIGHT: 68 IN | TEMPERATURE: 96.9 F | SYSTOLIC BLOOD PRESSURE: 130 MMHG

## 2018-06-04 DIAGNOSIS — B07.0 VERRUCA PLANTARIS: Primary | ICD-10-CM

## 2018-06-04 PROCEDURE — 99213 OFFICE O/P EST LOW 20 MIN: CPT | Performed by: PODIATRIST

## 2018-06-04 ASSESSMENT — PAIN SCALES - GENERAL: PAINLEVEL: NO PAIN (0)

## 2018-06-04 NOTE — PATIENT INSTRUCTIONS
Wart treatment    1.  After bathing or soaking for 5 minutes, remove any loose, macerated or dead skin with a fingernail clipper, pumice stone, emery board, or 120 grit sandpaper.  People prefer Amope or Personal Pedi, a battery operated rotary skin fely.    2.  Place one drop of salicylic acid liquid on each wart. Try to apply this medication about 2 mm beyond the lesion and try not to get the medicine on the surrounding skin.     3.  Cover the treated area with strong tape to keep the medicine in place. You can use athletic tape, a Band-Aid, or duct tape.  Keep this in place all day.  If you skip a couple days, you will lose the benefit of the previous several days of treatment.     4.  Every few days remove the dead skin.  Reapply the salicylic acid each day, keeping it on place all day.     5.  If you have severe or intolerable skin irritation, skip a day between treatments. Then start treatment again. However, skipping treatments will slow down how quickly warts will go away.    6.  To permanently get rid of plantar warts will require 3-12 weeks of constant treatment and irritation.      7.  To move treatment along faster, but also more painful, you may apply over the counter liquid nitrogen to the wart once weekly and do not apply salicylic acid that day, then resume as above.  If a blister forms 1-2 days after the liquid nitrogen application, pop the blister and apply a bandaid. Then reapply the salicylic acid the next day.     8.  Follow up as instructed.  If you develop complications, you must be seen in clinic to evaluate and treat the complication.

## 2018-06-04 NOTE — PROGRESS NOTES
"Chief Complaint   Patient presents with     RECHECK     Right foot wart, salicylic acid ; LOV 4/30/2018     BMI is NORMAL.    HPI:  Steven Romeo is a 22 year old male who is seen in consultation at the request of Rita Krause PA-C.    Pt presents for eval of:   (Onset, Location, L/R, Character, Treatments, Injury if yes)    2015 chemo and radiation     Onset Fall 2017, plantar Right 1st met callus/plantar wart.  Constant, dull ache, sharp and throbbing w/pressure, \"feels like stepping on a small rock\", pain 2  OTC treatments for wart, clips away w/toenail clipper    Works at [employer].    EXAM:Vitals: /64 (BP Location: Left arm, Cuff Size: Adult Regular)  Temp 96.9  F (36.1  C) (Temporal)  Ht 5' 7.56\" (1.716 m)  Wt 135 lb (61.2 kg)  BMI 20.79 kg/m2  BMI= Body mass index is 20.79 kg/(m^2).    General appearance: Patient is alert and fully cooperative with history & exam.  No sign of distress is noted during the visit.     Psychiatric: Affect is pleasant & appropriate.  Patient appears motivated to improve health.     Respiratory: Breathing is regular & unlabored while sitting.     HEENT: Hearing is intact to spoken word.  Speech is clear.  No gross evidence of visual impairment that would impact ambulation.     Vascular: DP & PT pulses are intact & regular bilaterally.  No significant edema or varicosities noted.  CFT and skin temperature is normal to both lower extremities.     Neurologic: Lower extremity sensation is intact to light touch.  No evidence of weakness or contracture in the lower extremities.  No evidence of neuropathy.    Dermatologic: Skin is intact to both lower extremities with normal texture, turgor and tone.  A 1 cm nucleated hyperkeratotic mass is noted about the right plantar medial first metatarsal head.  Pinpoint bleeding noted upon debridement.  Loss of skin lines noted.  Exquisitely uncomfortable for him with palpation even light palpation.    Musculoskeletal: Patient is " ambulatory without assistive device or brace.  No gross ankle deformity noted.  No foot or ankle joint effusion is noted.      ASSESSMENT:       ICD-10-CM    1. Verruca plantaris B07.0         PLAN:    4/9/18  Reviewed patient's chart in epic.  Discussed treatment options. Treatment options include multiple applications of freezing, cantharidin, salicylic acid with occlusive dressing, prescription antiviral topicals, or surgical excision or ablation.     After discussing these options and potential outcomes and post op care the patient wishes to pursue topical treatments.  I pared the lesion/s to a bleeding base and sent was instructed to apply over-the-counter topical liquid wart remover under occlusive dressing 24 hours per day 7 days per week as tolerated. Follow up with me in 2-3 weeks for debridement for comfort, reevaluation and further treatment if needed.  I explained this may take multiple applications to be of benefit. Topical treatments must remain consistent 24/7 until resolved, discontinuing treatment for a short period of time will eliminate efficacy. All questions were answered to their satisfaction. Written instructions were dispensed.    We discussed surgical excision as well however this would require up to 1 month of nonweightbearing.  He would like to try again with topical treatments first.    4/30/2018  I debrided a minimal amount macerated tissue   discussed options.  He would like to continue topical application of salicylic acid at home and home debridement  I offered liquid nitrogen today however this could require nonweightbearing if it blisters  No work restrictions at [employer] now  Offered surgical excision however this would require 2-4 weeks of nonweightbearing until the surgical burn recovers.     6/4/2018  Recommend more aggressive salicylic acid use at home daily 24 hours per day under occlusive dressing.  May continue liquid nitrogen at home OTC  Discussed alternative options  with excision however this would require up to 4 weeks of nonweightbearing and he is unwilling to do that.  He will follow-up in the next month or if you would like to move forward with more aggressive treatments.      Ronaldo Golden DPM

## 2018-06-04 NOTE — MR AVS SNAPSHOT
After Visit Summary   6/4/2018    Steven Romeo    MRN: [id number]           Patient Information     Date Of Birth          1995        Visit Information        Provider Department      6/4/2018 9:45 AM Ronaldo Golden DPM [facility]        Today's Diagnoses     Verruca plantaris    -  1      Care Instructions    Wart treatment    1.  After bathing or soaking for 5 minutes, remove any loose, macerated or dead skin with a fingernail clipper, pumice stone, emery board, or 120 grit sandpaper.  People prefer Amope or Personal Pedi, a battery operated rotary skin fely.    2.  Place one drop of salicylic acid liquid on each wart. Try to apply this medication about 2 mm beyond the lesion and try not to get the medicine on the surrounding skin.     3.  Cover the treated area with strong tape to keep the medicine in place. You can use athletic tape, a Band-Aid, or duct tape.  Keep this in place all day.  If you skip a couple days, you will lose the benefit of the previous several days of treatment.     4.  Every few days remove the dead skin.  Reapply the salicylic acid each day, keeping it on place all day.     5.  If you have severe or intolerable skin irritation, skip a day between treatments. Then start treatment again. However, skipping treatments will slow down how quickly warts will go away.    6.  To permanently get rid of plantar warts will require 3-12 weeks of constant treatment and irritation.      7.  To move treatment along faster, but also more painful, you may apply over the counter liquid nitrogen to the wart once weekly and do not apply salicylic acid that day, then resume as above.  If a blister forms 1-2 days after the liquid nitrogen application, pop the blister and apply a bandaid. Then reapply the salicylic acid the next day.     8.  Follow up as instructed.  If you develop complications, you must be seen in clinic to evaluate and treat the complication.   "            Follow-ups after your visit        Your next 10 appointments already scheduled     Jul 02, 2018  9:30 AM CDT   [facility] with Ronaldo Golden D[facility]    [address] [place] [id number] [phone]              Who to contact     If you have questions or need follow up information about today's clinic visit or your schedule please contact [facility] directly at [phone].  Normal or non-critical lab and imaging results will be communicated to you by [employer], letter or phone within 4 business days after the clinic has received the results. If you do not hear from us within 7 days, please contact the clinic through [employer] or phone. If you have a critical or abnormal lab result, we will notify you by phone as soon as possible.  Submit refill requests through [employer] or call your pharmacy and they will forward the refill request to us. Please allow 3 business days for your refill to be completed.          Additional Information About Your Visit        [employer] Information     [employer] gives you secure access to your electronic health record. If you see a primary care provider, you can also send messages to your care team and make appointments. If you have questions, please call your primary care clinic.  If you do not have a primary care provider, please call [phone] and they will assist you.        Care EveryWhere ID     This is your Care EveryWhere ID. This could be used by other organizations to access your [place] medical records  [id number]        Your Vitals Were     Temperature Height BMI (Body Mass Index)             96.9  F (36.1  C) (Temporal) 5' 7.56\" (1.716 m) 20.79 kg/m2          Blood Pressure from Last 3 Encounters:   06/04/18 130/64   04/30/18 122/70   04/09/18 110/72    Weight from Last 3 Encounters:   06/04/18 135 lb (61.2 kg)   04/30/18 135 lb (61.2 kg)   04/09/18 134 lb (60.8 kg)         "      Today, you had the following     No orders found for display       Primary Care Provider Office Phone # Fax #    Rita Krause PA-C [phone]       [address] [place] [address]        Equal Access to Services     GEMMA JOHNS : Hadii dori ku haddruo Soomaali, waaxda luqadaha, qaybta kaalmada adeegyada, waxalex jaden adewanda prince laSarababs jamison. So [facility] [phone].    ATENCIÓN: Si habla español, tiene a garcia disposición servicios gratuitos de asistencia lingüística. Llame al [phone].    We comply with applicable federal civil rights laws and [place] laws. We do not discriminate on the basis of race, color, national origin, age, disability, sex, sexual orientation, or gender identity.            Thank you!     Thank you for choosing [facility]  for your care. Our goal is always to provide you with excellent care. Hearing back from our patients is one way we can continue to improve our services. Please take a few minutes to complete the written survey that you may receive in the mail after your visit with us. Thank you!             Your Updated Medication List - Protect others around you: Learn how to safely use, store and throw away your medicines at www.disposemymeds.org.          This list is accurate as of 6/4/18 10:00 AM.  Always use your most recent med list.                   Brand Name Dispense Instructions for use Diagnosis    ACETAMINOPHEN PO      Take 1,000 mg by mouth every 6 hours as needed for pain        YANET-SELTZER PLUS COLD & FLU PO           diphenhydrAMINE 25 MG tablet    BENADRYL    10 tablet    Take 50mg ONE HOUR PRIOR to your MRI and then every 6 hours for two doses following your scan and then as directed    Pineal tumor       guaiFENesin 600 MG 12 hr tablet    MUCINEX    40 tablet    Take 2 tablets (1,200 mg) by mouth 2 times daily as needed for congestion    Viral URI with cough       ketorolac 10 MG tablet    TORADOL    20 tablet    Take  1 tablet (10 mg) by mouth every 6 hours as needed        VITAMIN C PO

## 2018-07-02 ENCOUNTER — OFFICE VISIT (OUTPATIENT)
Dept: PODIATRY | Facility: CLINIC | Age: 23
End: 2018-07-02
Payer: COMMERCIAL

## 2018-07-02 VITALS
HEIGHT: 68 IN | SYSTOLIC BLOOD PRESSURE: 120 MMHG | DIASTOLIC BLOOD PRESSURE: 72 MMHG | BODY MASS INDEX: 19.7 KG/M2 | WEIGHT: 130 LBS | TEMPERATURE: 95.4 F

## 2018-07-02 DIAGNOSIS — B07.0 VERRUCA PLANTARIS: Primary | ICD-10-CM

## 2018-07-02 PROCEDURE — 99213 OFFICE O/P EST LOW 20 MIN: CPT | Performed by: PODIATRIST

## 2018-07-02 ASSESSMENT — PAIN SCALES - GENERAL: PAINLEVEL: MILD PAIN (2)

## 2018-07-02 NOTE — PATIENT INSTRUCTIONS
Follow-up as needed    Wart treatment    1.  After bathing or soaking for 5 minutes, remove any loose, macerated or dead skin with a fingernail clipper, pumice stone, emery board, or 120 grit sandpaper.  People prefer Amope or Personal Pedi, a battery operated rotary skin fely.    2.  Place one drop of salicylic acid liquid on each wart. Try to apply this medication about 2 mm beyond the lesion and try not to get the medicine on the surrounding skin.     3.  Cover the treated area with strong tape to keep the medicine in place. You can use athletic tape, a Band-Aid, or duct tape.  Keep this in place all day.  If you skip a couple days, you will lose the benefit of the previous several days of treatment.     4.  Every few days remove the dead skin.  Reapply the salicylic acid each day, keeping it on place all day.     5.  If you have severe or intolerable skin irritation, skip a day between treatments. Then start treatment again. However, skipping treatments will slow down how quickly warts will go away.    6.  To permanently get rid of plantar warts will require 3-12 weeks of constant treatment and irritation.      7.  To move treatment along faster, but also more painful, you may apply over the counter liquid nitrogen to the wart once weekly and do not apply salicylic acid that day, then resume as above.  If a blister forms 1-2 days after the liquid nitrogen application, pop the blister and apply a bandaid. Then reapply the salicylic acid the next day.     8.  Follow up as instructed.  If you develop complications, you must be seen in clinic to evaluate and treat the complication.

## 2018-07-02 NOTE — MR AVS SNAPSHOT
After Visit Summary   7/2/2018    Steven Romeo    MRN: [id number]           Patient Information     Date Of Birth          1995        Visit Information        Provider Department      7/2/2018 9:30 AM Ronaldo Golden DPM [facility]        Today's Diagnoses     Verruca plantaris    -  1      Care Instructions    Follow-up as needed    Wart treatment    1.  After bathing or soaking for 5 minutes, remove any loose, macerated or dead skin with a fingernail clipper, pumice stone, emery board, or 120 grit sandpaper.  People prefer Amope or Personal Pedi, a battery operated rotary skin fely.    2.  Place one drop of salicylic acid liquid on each wart. Try to apply this medication about 2 mm beyond the lesion and try not to get the medicine on the surrounding skin.     3.  Cover the treated area with strong tape to keep the medicine in place. You can use athletic tape, a Band-Aid, or duct tape.  Keep this in place all day.  If you skip a couple days, you will lose the benefit of the previous several days of treatment.     4.  Every few days remove the dead skin.  Reapply the salicylic acid each day, keeping it on place all day.     5.  If you have severe or intolerable skin irritation, skip a day between treatments. Then start treatment again. However, skipping treatments will slow down how quickly warts will go away.    6.  To permanently get rid of plantar warts will require 3-12 weeks of constant treatment and irritation.      7.  To move treatment along faster, but also more painful, you may apply over the counter liquid nitrogen to the wart once weekly and do not apply salicylic acid that day, then resume as above.  If a blister forms 1-2 days after the liquid nitrogen application, pop the blister and apply a bandaid. Then reapply the salicylic acid the next day.     8.  Follow up as instructed.  If you develop complications, you must be seen in clinic to evaluate and treat  "the complication.              Follow-ups after your visit        Who to contact     If you have questions or need follow up information about today's clinic visit or your schedule please contact [facility] directly at [phone].  Normal or non-critical lab and imaging results will be communicated to you by MyChart, letter or phone within 4 business days after the clinic has received the results. If you do not hear from us within 7 days, please contact the clinic through MyChart or phone. If you have a critical or abnormal lab result, we will notify you by phone as soon as possible.  Submit refill requests through [employer] or call your pharmacy and they will forward the refill request to us. Please allow 3 business days for your refill to be completed.          Additional Information About Your Visit        [employer] Information     [employer] gives you secure access to your electronic health record. If you see a primary care provider, you can also send messages to your care team and make appointments. If you have questions, please call your primary care clinic.  If you do not have a primary care provider, please call [phone] and they will assist you.        Care EveryWhere ID     This is your Care EveryWhere ID. This could be used by other organizations to access your [facility] medical records  [id number]        Your Vitals Were     Temperature Height BMI (Body Mass Index)             95.4  F (35.2  C) (Temporal) 5' 7.56\" (1.716 m) 20.02 kg/m2          Blood Pressure from Last 3 Encounters:   07/02/18 120/72   06/04/18 130/64   04/30/18 122/70    Weight from Last 3 Encounters:   07/02/18 130 lb (59 kg)   06/04/18 135 lb (61.2 kg)   04/30/18 135 lb (61.2 kg)              Today, you had the following     No orders found for display       Primary Care Provider Office Phone # Fax #    Rita Krause PA-C [phone] [address] [place] [address]        Equal Access to " Services     [facility]: Hadii aad ku haddrutrey Leidylance, waaxda luqadaha, qaybta kaalmada molly, adeline rankin . So [facility] [phone].    ATENCIÓN: Si habla arnoldo, tiene a garcia disposición servicios gratuitos de asistencia lingüística. Llame al [phone].    We comply with applicable federal civil rights laws and [place] laws. We do not discriminate on the basis of race, color, national origin, age, disability, sex, sexual orientation, or gender identity.            Thank you!     Thank you for choosing [facility]  for your care. Our goal is always to provide you with excellent care. Hearing back from our patients is one way we can continue to improve our services. Please take a few minutes to complete the written survey that you may receive in the mail after your visit with us. Thank you!             Your Updated Medication List - Protect others around you: Learn how to safely use, store and throw away your medicines at www.disposemymeds.org.          This list is accurate as of 7/2/18  9:49 AM.  Always use your most recent med list.                   Brand Name Dispense Instructions for use Diagnosis    ACETAMINOPHEN PO      Take 1,000 mg by mouth every 6 hours as needed for pain        YANET-SELTZER PLUS COLD & FLU PO           diphenhydrAMINE 25 MG tablet    BENADRYL    10 tablet    Take 50mg ONE HOUR PRIOR to your MRI and then every 6 hours for two doses following your scan and then as directed    Pineal tumor       guaiFENesin 600 MG 12 hr tablet    MUCINEX    40 tablet    Take 2 tablets (1,200 mg) by mouth 2 times daily as needed for congestion    Viral URI with cough       ketorolac 10 MG tablet    TORADOL    20 tablet    Take 1 tablet (10 mg) by mouth every 6 hours as needed        VITAMIN C PO

## 2018-07-02 NOTE — PROGRESS NOTES
"Chief Complaint   Patient presents with     RECHECK     Right foot wart, salicylic acid ; LOV 6/4/2018     BMI is normal.    HPI:  Steven Romeo is a 22 year old male who is seen in consultation at the request of Rita Krause PA-C.    Pt presents for eval of:   (Onset, Location, L/R, Character, Treatments, Injury if yes)    2015 chemo and radiation     Onset Fall 2017, plantar Right 1st met callus/plantar wart.  Constant, dull ache, sharp and throbbing w/pressure, \"feels like stepping on a small rock\", pain 2  OTC treatments for wart, clips away w/toenail clipper    Works at [employer].    EXAM:Vitals: /72  Temp 95.4  F (35.2  C) (Temporal)  Ht 5' 7.56\" (1.716 m)  Wt 130 lb (59 kg)  BMI 20.02 kg/m2  BMI= Body mass index is 20.02 kg/(m^2).    General appearance: Patient is alert and fully cooperative with history & exam.  No sign of distress is noted during the visit.     Psychiatric: Affect is pleasant & appropriate.  Patient appears motivated to improve health.     Respiratory: Breathing is regular & unlabored while sitting.     HEENT: Hearing is intact to spoken word.  Speech is clear.  No gross evidence of visual impairment that would impact ambulation.     Vascular: DP & PT pulses are intact & regular bilaterally.  No significant edema or varicosities noted.  CFT and skin temperature is normal to both lower extremities.     Neurologic: Lower extremity sensation is intact to light touch.  No evidence of weakness or contracture in the lower extremities.  No evidence of neuropathy.    Dermatologic: Skin is intact to both lower extremities with normal texture, turgor and tone.  A 1 cm nucleated hyperkeratotic mass is noted about the right plantar medial first metatarsal head.  Pinpoint bleeding noted upon debridement.  Loss of skin lines noted.  Exquisitely uncomfortable for him with palpation even light palpation.    Musculoskeletal: Patient is ambulatory without assistive device or brace.  No " gross ankle deformity noted.  No foot or ankle joint effusion is noted.      ASSESSMENT:       ICD-10-CM    1. Verruca plantaris B07.0         PLAN:    4/9/18  Reviewed patient's chart in epic.  Discussed treatment options. Treatment options include multiple applications of freezing, cantharidin, salicylic acid with occlusive dressing, prescription antiviral topicals, or surgical excision or ablation.     After discussing these options and potential outcomes and post op care the patient wishes to pursue topical treatments.  I pared the lesion/s to a bleeding base and sent was instructed to apply over-the-counter topical liquid wart remover under occlusive dressing 24 hours per day 7 days per week as tolerated. Follow up with me in 2-3 weeks for debridement for comfort, reevaluation and further treatment if needed.  I explained this may take multiple applications to be of benefit. Topical treatments must remain consistent 24/7 until resolved, discontinuing treatment for a short period of time will eliminate efficacy. All questions were answered to their satisfaction. Written instructions were dispensed.    We discussed surgical excision as well however this would require up to 1 month of nonweightbearing.  He would like to try again with topical treatments first.    4/30/2018  I debrided a minimal amount macerated tissue   discussed options.  He would like to continue topical application of salicylic acid at home and home debridement  I offered liquid nitrogen today however this could require nonweightbearing if it blisters  No work restrictions at [employer] now  Offered surgical excision however this would require 2-4 weeks of nonweightbearing until the surgical burn recovers.     6/4/2018  Recommend more aggressive salicylic acid use at home daily 24 hours per day under occlusive dressing.  May continue liquid nitrogen at home OTC  Discussed alternative options with excision however this would require up to 4  weeks of nonweightbearing and he is unwilling to do that.  He will follow-up in the next month or if you would like to move forward with more aggressive treatments.    7/2/2018  He is doing a good job with topical application of liquid nitrogen once weekly at home followed by daily 24 hour application of occlusive dressing and salicylic acid.  He has gained considerable change about the skin is localized properly.  Minimal debridement occurred today as he is doing a good job debriding macerated hyperkeratosis.  Offered liquid nitrogen to him today but he refuses as he must stand on his feet at work.  He understands his options and will follow-up in a month or 2 if you would like to move forward with more aggressive liquid nitrogen.  All questions were answered.  Follow-up as needed      Ronaldo Golden DPM

## 2018-08-06 ENCOUNTER — OFFICE VISIT (OUTPATIENT)
Dept: PODIATRY | Facility: CLINIC | Age: 23
End: 2018-08-06
Payer: COMMERCIAL

## 2018-08-06 VITALS
TEMPERATURE: 96.6 F | HEIGHT: 68 IN | BODY MASS INDEX: 20.46 KG/M2 | SYSTOLIC BLOOD PRESSURE: 120 MMHG | WEIGHT: 135 LBS | DIASTOLIC BLOOD PRESSURE: 82 MMHG

## 2018-08-06 DIAGNOSIS — B07.0 VERRUCA PLANTARIS: Primary | ICD-10-CM

## 2018-08-06 PROCEDURE — 17110 DESTRUCTION B9 LES UP TO 14: CPT | Performed by: PODIATRIST

## 2018-08-06 ASSESSMENT — PAIN SCALES - GENERAL: PAINLEVEL: NO PAIN (0)

## 2018-08-06 NOTE — PROGRESS NOTES
"Chief Complaint   Patient presents with     RECHECK     Right plantar wart; LOV 7/2/2018     BMI is normal.    HPI:  Steven Romeo is a 22 year old male who is seen in consultation at the request of Rita Krause PA-C.    Pt presents for eval of:   (Onset, Location, L/R, Character, Treatments, Injury if yes)    2015 chemo and radiation     Onset Fall 2017, plantar Right 1st met callus/plantar wart.  Constant, dull ache, sharp and throbbing w/pressure, \"feels like stepping on a small rock\", pain 2  OTC treatments for wart, clips away w/toenail clipper    Works at [employer].    EXAM:Vitals: /82 (BP Location: Left arm, Cuff Size: Adult Regular)  Temp 96.6  F (35.9  C) (Temporal)  Ht 5' 7.56\" (1.716 m)  Wt 135 lb (61.2 kg)  BMI 20.79 kg/m2  BMI= Body mass index is 20.79 kg/(m^2).    General appearance: Patient is alert and fully cooperative with history & exam.  No sign of distress is noted during the visit.     Psychiatric: Affect is pleasant & appropriate.  Patient appears motivated to improve health.     Respiratory: Breathing is regular & unlabored while sitting.     HEENT: Hearing is intact to spoken word.  Speech is clear.  No gross evidence of visual impairment that would impact ambulation.     Vascular: DP & PT pulses are intact & regular bilaterally.  No significant edema or varicosities noted.  CFT and skin temperature is normal to both lower extremities.     Neurologic: Lower extremity sensation is intact to light touch.  No evidence of weakness or contracture in the lower extremities.  No evidence of neuropathy.    Dermatologic: Skin is intact to both lower extremities with normal texture, turgor and tone.  A 1 cm flat skin mass is noted about the right plantar medial first metatarsal head.  Pinpoint bleeding noted upon debridement.  Loss of skin lines noted.  Exquisitely uncomfortable for him with palpation even light palpation.  Overall however this is much improved.    Musculoskeletal: " Patient is ambulatory without assistive device or brace.  No gross ankle deformity noted.  No foot or ankle joint effusion is noted.      ASSESSMENT:       ICD-10-CM    1. Verruca plantaris B07.0 DESTRUCT BENIGN LESION, UP TO 14        PLAN:    4/9/18  Reviewed patient's chart in epic.  Discussed treatment options. Treatment options include multiple applications of freezing, cantharidin, salicylic acid with occlusive dressing, prescription antiviral topicals, or surgical excision or ablation.     After discussing these options and potential outcomes and post op care the patient wishes to pursue topical treatments.  I pared the lesion/s to a bleeding base and sent was instructed to apply over-the-counter topical liquid wart remover under occlusive dressing 24 hours per day 7 days per week as tolerated. Follow up with me in 2-3 weeks for debridement for comfort, reevaluation and further treatment if needed.  I explained this may take multiple applications to be of benefit. Topical treatments must remain consistent 24/7 until resolved, discontinuing treatment for a short period of time will eliminate efficacy. All questions were answered to their satisfaction. Written instructions were dispensed.    We discussed surgical excision as well however this would require up to 1 month of nonweightbearing.  He would like to try again with topical treatments first.    4/30/2018  I debrided a minimal amount macerated tissue   discussed options.  He would like to continue topical application of salicylic acid at home and home debridement  I offered liquid nitrogen today however this could require nonweightbearing if it blisters  No work restrictions at [employer] [facility] [employer] [facility] [employer] now  Offered surgical excision however this would require 2-4 weeks of nonweightbearing until the surgical burn recovers.     6/4/2018  Recommend more aggressive salicylic acid use at home daily 24 hours per day under occlusive dressing.  May continue liquid nitrogen at  home OTC  Discussed alternative options with excision however this would require up to 4 weeks of nonweightbearing and he is unwilling to do that.  He will follow-up in the next month or if you would like to move forward with more aggressive treatments.    7/2/2018  He is doing a good job with topical application of liquid nitrogen once weekly at home followed by daily 24 hour application of occlusive dressing and salicylic acid.  He has gained considerable change about the skin is localized properly.  Minimal debridement occurred today as he is doing a good job debriding macerated hyperkeratosis.  Offered liquid nitrogen to him today but he refuses as he must stand on his feet at work.  He understands his options and will follow-up in a month or 2 if you would like to move forward with more aggressive liquid nitrogen.  All questions were answered.  Follow-up as needed    8/6/2018  Upon examination there is still some small amount of bleeding and the entire lesion is far less than 1 cm.  Normal skin lines appear to be impinging on the central lesion measuring about 3 mm of area with abnormal skin lines.  Therefore we discussed another application of liquid nitrogen versus home treatment versus discontinuing treatment.  After these discussions and review of risks and how much she is invested on this he would like to attempt one last treatment of liquid nitrogen.    We discussed alternative treatment options and obtained informed consent as well as discussed risks.  He is willing to manage these risks at this time.  I then applied 3 applications of liquid nitrogen for 10 seconds each.  A bandage was applied.  Follow-up in 1 month with any continued symptoms or abnormal skin otherwise as needed.  All questions were answered.      Ronaldo Golden DPM

## 2018-08-06 NOTE — MR AVS SNAPSHOT
After Visit Summary   8/6/2018    Steven Romeo    MRN: [id number]           Patient Information     Date Of Birth          1995        Visit Information        Provider Department      8/6/2018 10:30 AM Ronaldo Golden DPM [facility]        Today's Diagnoses     Verruca plantaris    -  1      Care Instructions    Wart treatment    1.  After bathing or soaking for 5 minutes, remove any loose, macerated or dead skin with a fingernail clipper, pumice stone, emery board, or 120 grit sandpaper.  People prefer Amope or Personal Pedi, a battery operated rotary skin fely.    2.  Place one drop of salicylic acid liquid on each wart. Try to apply this medication about 2 mm beyond the lesion and try not to get the medicine on the surrounding skin.     3.  Cover the treated area with strong tape to keep the medicine in place. You can use athletic tape, a Band-Aid, or duct tape.  Keep this in place all day.  If you skip a couple days, you will lose the benefit of the previous several days of treatment.     4.  Every few days remove the dead skin.  Reapply the salicylic acid each day, keeping it on place all day.     5.  If you have severe or intolerable skin irritation, skip a day between treatments. Then start treatment again. However, skipping treatments will slow down how quickly warts will go away.    6.  To permanently get rid of plantar warts will require 3-12 weeks of constant treatment and irritation.      7.  To move treatment along faster, but also more painful, you may apply over the counter liquid nitrogen to the wart once weekly and do not apply salicylic acid that day, then resume as above.  If a blister forms 1-2 days after the liquid nitrogen application, pop the blister and apply a bandaid. Then reapply the salicylic acid the next day.     8.  Follow up as instructed.  If you develop complications, you must be seen in clinic to evaluate and treat the complication.   "            Follow-ups after your visit        Who to contact     If you have questions or need follow up information about today's clinic visit or your schedule please contact [facility] directly at [phone].  Normal or non-critical lab and imaging results will be communicated to you by MyChart, letter or phone within 4 business days after the clinic has received the results. If you do not hear from us within 7 days, please contact the clinic through MyChart or phone. If you have a critical or abnormal lab result, we will notify you by phone as soon as possible.  Submit refill requests through [employer] or call your pharmacy and they will forward the refill request to us. Please allow 3 business days for your refill to be completed.          Additional Information About Your Visit        [employer] Information     [employer] gives you secure access to your electronic health record. If you see a primary care provider, you can also send messages to your care team and make appointments. If you have questions, please call your primary care clinic.  If you do not have a primary care provider, please call [phone] and they will assist you.        Care EveryWhere ID     This is your Care EveryWhere ID. This could be used by other organizations to access your [facility] medical records  [id number]        Your Vitals Were     Temperature Height BMI (Body Mass Index)             96.6  F (35.9  C) (Temporal) 5' 7.56\" (1.716 m) 20.79 kg/m2          Blood Pressure from Last 3 Encounters:   08/06/18 120/82   07/02/18 120/72   06/04/18 130/64    Weight from Last 3 Encounters:   08/06/18 135 lb (61.2 kg)   07/02/18 130 lb (59 kg)   06/04/18 135 lb (61.2 kg)              We Performed the Following     DESTRUCT BENIGN LESION, UP TO 14        Primary Care Provider Office Phone # Fax #    Rita Krause PA-C [phone] [address] [place] [address]        Equal Access to Services     " GEMMA JOHNS : Hadii aad ku rosie العلي, waaxda luqadaha, qaybta kaalmada codyjoseshira, adeline lubna herculessharadayde rankin . [facility] [phone].    ATENCIÓN: Si habla español, tiene a garcia disposición servicios gratuitos de asistencia lingüística. Llame al [phone].    We comply with applicable federal civil rights laws and [place] laws. We do not discriminate on the basis of race, color, national origin, age, disability, sex, sexual orientation, or gender identity.            Thank you!     Thank you for choosing [facility]  for your care. Our goal is always to provide you with excellent care. Hearing back from our patients is one way we can continue to improve our services. Please take a few minutes to complete the written survey that you may receive in the mail after your visit with us. Thank you!             Your Updated Medication List - Protect others around you: Learn how to safely use, store and throw away your medicines at www.disposemymeds.org.          This list is accurate as of 8/6/18 11:08 AM.  Always use your most recent med list.                   Brand Name Dispense Instructions for use Diagnosis    ACETAMINOPHEN PO      Take 1,000 mg by mouth every 6 hours as needed for pain        YANET-SELTZER PLUS COLD & FLU PO           diphenhydrAMINE 25 MG tablet    BENADRYL    10 tablet    Take 50mg ONE HOUR PRIOR to your MRI and then every 6 hours for two doses following your scan and then as directed    Pineal tumor       guaiFENesin 600 MG 12 hr tablet    MUCINEX    40 tablet    Take 2 tablets (1,200 mg) by mouth 2 times daily as needed for congestion    Viral URI with cough       ketorolac 10 MG tablet    TORADOL    20 tablet    Take 1 tablet (10 mg) by mouth every 6 hours as needed        VITAMIN C PO

## 2019-03-02 ENCOUNTER — HOSPITAL ENCOUNTER (EMERGENCY)
Facility: CLINIC | Age: 24
Discharge: HOME | End: 2019-03-02
Attending: FAMILY MEDICINE | Admitting: FAMILY MEDICINE
Payer: COMMERCIAL

## 2019-03-02 VITALS
SYSTOLIC BLOOD PRESSURE: 110 MMHG | RESPIRATION RATE: 20 BRPM | BODY MASS INDEX: 20.98 KG/M2 | OXYGEN SATURATION: 100 % | WEIGHT: 136.2 LBS | HEART RATE: 57 BPM | DIASTOLIC BLOOD PRESSURE: 64 MMHG | TEMPERATURE: 97.9 F

## 2019-03-02 DIAGNOSIS — K52.9 GASTROENTERITIS: ICD-10-CM

## 2019-03-02 LAB
ALBUMIN SERPL-MCNC: 4.8 G/DL (ref 3.4–5)
ALP SERPL-CCNC: 73 U/L (ref 40–150)
ALT SERPL W P-5'-P-CCNC: 19 U/L (ref 0–70)
ANION GAP SERPL CALCULATED.3IONS-SCNC: 9 MMOL/L (ref 3–14)
AST SERPL W P-5'-P-CCNC: 16 U/L (ref 0–45)
BASOPHILS # BLD AUTO: 0 10E9/L (ref 0–0.2)
BASOPHILS NFR BLD AUTO: 0.5 %
BILIRUB SERPL-MCNC: 0.6 MG/DL (ref 0.2–1.3)
BUN SERPL-MCNC: 7 MG/DL (ref 7–30)
CALCIUM SERPL-MCNC: 9.8 MG/DL (ref 8.5–10.1)
CHLORIDE SERPL-SCNC: 104 MMOL/L (ref 94–109)
CO2 SERPL-SCNC: 27 MMOL/L (ref 20–32)
CREAT SERPL-MCNC: 0.85 MG/DL (ref 0.66–1.25)
DIFFERENTIAL METHOD BLD: NORMAL
EOSINOPHIL NFR BLD AUTO: 0.7 %
ERYTHROCYTE [DISTWIDTH] IN BLOOD BY AUTOMATED COUNT: 12.3 % (ref 10–15)
GFR SERPL CREATININE-BSD FRML MDRD: >90 ML/MIN/{1.73_M2}
GLUCOSE SERPL-MCNC: 87 MG/DL (ref 70–99)
HCT VFR BLD AUTO: 46 % (ref 40–53)
HGB BLD-MCNC: 15.7 G/DL (ref 13.3–17.7)
IMM GRANULOCYTES # BLD: 0 10E9/L (ref 0–0.4)
IMM GRANULOCYTES NFR BLD: 0.5 %
LIPASE SERPL-CCNC: 109 U/L (ref 73–393)
LYMPHOCYTES # BLD AUTO: 3.1 10E9/L (ref 0.8–5.3)
LYMPHOCYTES NFR BLD AUTO: 40.3 %
MCH RBC QN AUTO: 29.8 PG (ref 26.5–33)
MCHC RBC AUTO-ENTMCNC: 34.1 G/DL (ref 31.5–36.5)
MCV RBC AUTO: 87 FL (ref 78–100)
MONOCYTES # BLD AUTO: 0.6 10E9/L (ref 0–1.3)
MONOCYTES NFR BLD AUTO: 7.5 %
NEUTROPHILS # BLD AUTO: 3.9 10E9/L (ref 1.6–8.3)
NEUTROPHILS NFR BLD AUTO: 50.5 %
NRBC # BLD AUTO: 0 10*3/UL
NRBC BLD AUTO-RTO: 0 /100
PLATELET # BLD AUTO: 291 10E9/L (ref 150–450)
POTASSIUM SERPL-SCNC: 3.7 MMOL/L (ref 3.4–5.3)
PROT SERPL-MCNC: 8.4 G/DL (ref 6.8–8.8)
RBC # BLD AUTO: 5.27 10E12/L (ref 4.4–5.9)
SODIUM SERPL-SCNC: 140 MMOL/L (ref 133–144)
WBC # BLD AUTO: 7.7 10E9/L (ref 4–11)

## 2019-03-02 PROCEDURE — 25000132 ZZH RX MED GY IP 250 OP 250 PS 637: Performed by: FAMILY MEDICINE

## 2019-03-02 PROCEDURE — 80053 COMPREHEN METABOLIC PANEL: CPT | Performed by: FAMILY MEDICINE

## 2019-03-02 PROCEDURE — 96374 THER/PROPH/DIAG INJ IV PUSH: CPT | Performed by: FAMILY MEDICINE

## 2019-03-02 PROCEDURE — 96375 TX/PRO/DX INJ NEW DRUG ADDON: CPT | Performed by: FAMILY MEDICINE

## 2019-03-02 PROCEDURE — 83690 ASSAY OF LIPASE: CPT | Performed by: FAMILY MEDICINE

## 2019-03-02 PROCEDURE — 99285 EMERGENCY DEPT VISIT HI MDM: CPT | Mod: 25 | Performed by: FAMILY MEDICINE

## 2019-03-02 PROCEDURE — 25000128 H RX IP 250 OP 636: Performed by: FAMILY MEDICINE

## 2019-03-02 PROCEDURE — 99285 EMERGENCY DEPT VISIT HI MDM: CPT | Mod: Z6 | Performed by: FAMILY MEDICINE

## 2019-03-02 PROCEDURE — 96361 HYDRATE IV INFUSION ADD-ON: CPT | Performed by: FAMILY MEDICINE

## 2019-03-02 PROCEDURE — 25000131 ZZH RX MED GY IP 250 OP 636 PS 637: Performed by: FAMILY MEDICINE

## 2019-03-02 PROCEDURE — 85025 COMPLETE CBC W/AUTO DIFF WBC: CPT | Performed by: FAMILY MEDICINE

## 2019-03-02 RX ORDER — HYDROMORPHONE HYDROCHLORIDE 1 MG/ML
0.5 INJECTION, SOLUTION INTRAMUSCULAR; INTRAVENOUS; SUBCUTANEOUS
Status: DISCONTINUED | OUTPATIENT
Start: 2019-03-02 | End: 2019-03-02 | Stop reason: HOSPADM

## 2019-03-02 RX ORDER — DIPHENOXYLATE HCL/ATROPINE 2.5-.025MG
1 TABLET ORAL ONCE
Status: COMPLETED | OUTPATIENT
Start: 2019-03-02 | End: 2019-03-02

## 2019-03-02 RX ORDER — ONDANSETRON 2 MG/ML
4 INJECTION INTRAMUSCULAR; INTRAVENOUS EVERY 30 MIN PRN
Status: DISCONTINUED | OUTPATIENT
Start: 2019-03-02 | End: 2019-03-02 | Stop reason: HOSPADM

## 2019-03-02 RX ORDER — ONDANSETRON 4 MG/1
4 TABLET, ORALLY DISINTEGRATING ORAL EVERY 6 HOURS PRN
Qty: 10 TABLET | Refills: 0 | Status: SHIPPED | OUTPATIENT
Start: 2019-03-02 | End: 2019-03-05

## 2019-03-02 RX ORDER — ONDANSETRON 4 MG/1
4 TABLET, ORALLY DISINTEGRATING ORAL ONCE
Status: COMPLETED | OUTPATIENT
Start: 2019-03-02 | End: 2019-03-02

## 2019-03-02 RX ADMIN — ONDANSETRON 4 MG: 2 INJECTION INTRAMUSCULAR; INTRAVENOUS at 12:35

## 2019-03-02 RX ADMIN — SODIUM CHLORIDE 1000 ML: 9 INJECTION, SOLUTION INTRAVENOUS at 13:22

## 2019-03-02 RX ADMIN — SODIUM CHLORIDE 1000 ML: 9 INJECTION, SOLUTION INTRAVENOUS at 12:38

## 2019-03-02 RX ADMIN — DIPHENOXYLATE HYDROCHLORIDE AND ATROPINE SULFATE 1 TABLET: 2.5; .025 TABLET ORAL at 14:07

## 2019-03-02 RX ADMIN — Medication 0.5 MG: at 12:39

## 2019-03-02 RX ADMIN — ONDANSETRON 4 MG: 4 TABLET, ORALLY DISINTEGRATING ORAL at 14:21

## 2019-03-02 ASSESSMENT — ENCOUNTER SYMPTOMS
DIZZINESS: 0
CHILLS: 1
DYSURIA: 0
EYE REDNESS: 0
LIGHT-HEADEDNESS: 1
CHEST TIGHTNESS: 0
NECK PAIN: 0
POLYDIPSIA: 0
SHORTNESS OF BREATH: 0
COUGH: 0
APPETITE CHANGE: 1
NERVOUS/ANXIOUS: 1
DIARRHEA: 1
VOMITING: 1
SORE THROAT: 0
HEADACHES: 1
FEVER: 0
EYE PAIN: 0
BACK PAIN: 0
FREQUENCY: 0
NAUSEA: 1
WEAKNESS: 1
ACTIVITY CHANGE: 0
NUMBNESS: 0
ABDOMINAL PAIN: 1

## 2019-03-02 NOTE — ED PROVIDER NOTES
History     Chief Complaint   Patient presents with     Nausea, Vomiting, & Diarrhea     HPI  Steven Romeo is a 23 year old male with history of anxiety, panic attacks, PTSD, germ cell tumor status post chemotherapy-cure, hydrocephalus with ventriculostomy last checked 3/25/15.  Presents with sudden onset of nausea vomiting and diarrhea last night which persisted and today.  He feels weak and dehydrated.  This is not a recurring problem.  He denies any headache or vision changes.    Allergies:  Allergies   Allergen Reactions     Gadolinium Itching     Redness/Itching immediately with Gadolinium injection.  Patient reacts even with premedication.  ksa 4-1--2016     Neupogen [Filgrastim]      Tobacco dependence and depression.  Problem List:    Patient Active Problem List    Diagnosis Date Noted     Nightmares 04/09/2018     Priority: Medium     FREDA (generalized anxiety disorder) 04/09/2018     Priority: Medium     Panic attacks 04/09/2018     Priority: Medium     PTSD (post-traumatic stress disorder) 04/09/2018     Priority: Medium     Callus of foot-right 04/05/2018     Priority: Medium     Status post chemotherapy 07/01/2015     Priority: Medium     Health Care Home 06/30/2015     Priority: Medium     Status:  Declined  Care Coordinator:  Elvie Walsh    See Letters for [facility] Care Plan  Date:  June 30, 2015         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     Priority: Medium     Hydrocephalus      Priority: Medium     S/p third ventriculostomy 3/25/15       Tobacco abuse 11/09/2014     Priority: Medium     Moderate recurrent major depression (H) 12/03/2013     Priority: Medium        Past Medical History:    Past Medical History:   Diagnosis Date     Cancer (H)      Hydrocephalus 2015     Infectious mononucleosis      Other chronic pain      Pineal tumor 2015     Pneumonia 9/13/2011     Pneumothorax on left 11/2014     Spontaneous pneumothorax 11/9/2014       Past Surgical History:    Past Surgical History:    Procedure Laterality Date     INSERT CHEST TUBE Left 11/10/2014    Procedure: INSERT CHEST TUBE;  Surgeon: Damaso Westbrook MD;  Location: [facility]     REMOVE PORT VASCULAR ACCESS Right 10/12/2015    Procedure: REMOVE PORT VASCULAR ACCESS;  Surgeon: Renato Arroyo MD;  Location: [facility]      THORACOSCOPIC BLEBECTOMY Left 2014    Procedure: THORACOSCOPIC BLEBECTOMY;  Surgeon: Damaso eWstbrook MD;  Location: [facility] OR     THORACOSCOPIC PLEURODESIS Left 2014    Procedure: THORACOSCOPIC PLEURODESIS;  Surgeon: Damaso Westbrook MD;  Location: [facility]     VENTRICULOSCOPY Right 3/25/2015    Procedure: VENTRICULOSCOPY;  Surgeon: Vasile Boyd MD;  Location: [facility]       Family History:    Family History   Problem Relation Age of Onset     Family History Negative Other        Social History:  Marital Status:  Single [1]  Social History     Tobacco Use     Smoking status: Current Every Day Smoker     Packs/day: 0.50     Years: 1.00     Pack years: 0.50     Types: Cigarettes     Last attempt to quit: 2014     Years since quittin.2     Smokeless tobacco: Never Used     Tobacco comment: Mom smokes outside.   Substance Use Topics     Alcohol use: Yes     Alcohol/week: 0.0 oz     Comment: occasionally     Drug use: Yes     Types: Methamphetamines        Medications:      ACETAMINOPHEN PO   Ascorbic Acid (VITAMIN C PO)   diphenhydrAMINE (BENADRYL) 25 MG tablet   ondansetron (ZOFRAN ODT) 4 MG ODT tab   guaiFENesin (MUCINEX) 600 MG 12 hr tablet   ketorolac (TORADOL) 10 MG tablet   [id number] (YANET-SELTZER PLUS COLD & FLU PO)         Review of Systems   Constitutional: Positive for appetite change and chills. Negative for activity change and fever.   HENT: Negative for congestion and sore throat.    Eyes: Negative for pain, redness and visual disturbance.   Respiratory: Negative for cough, chest tightness and shortness of breath.    Cardiovascular: Negative for chest pain.    Gastrointestinal: Positive for abdominal pain, diarrhea, nausea and vomiting.        Crampy generalized abdominal pain   Endocrine: Negative for polydipsia and polyuria.   Genitourinary: Negative for dysuria and frequency.   Musculoskeletal: Negative for back pain and neck pain.   Skin: Negative for rash.   Neurological: Positive for weakness, light-headedness and headaches. Negative for dizziness and numbness.        Had a headache ever since he started vomiting area   Psychiatric/Behavioral: The patient is nervous/anxious.    All other systems reviewed and are negative.      Physical Exam   BP: 139/79  Heart Rate: 63  Temp: 97.9  F (36.6  C)  Resp: 20  Weight: 61.8 kg (136 lb 3.2 oz)  SpO2: 99 %      Physical Exam   Constitutional: He is oriented to person, place, and time.   Alert pale thin ill-appearing 23-year-old.  Is afebrile and vital signs otherwise stable.  No tachycardia./79   Temp 97.9  F (36.6  C) (Oral)   Resp 20   Wt 61.8 kg (136 lb 3.2 oz)   SpO2 99%   BMI 20.98 kg/m    He appears dehydrated with dry mucous membranes.   HENT:   Head: Normocephalic and atraumatic.   Right Ear: External ear normal.   Left Ear: External ear normal.   Nose: Nose normal.   Mouth/Throat: Oropharynx is clear and moist.   Eyes: Conjunctivae and EOM are normal. Pupils are equal, round, and reactive to light.   Neck: Normal range of motion. Neck supple.   Cardiovascular: Normal rate, regular rhythm, normal heart sounds and intact distal pulses.   Pulmonary/Chest: Effort normal and breath sounds normal.   Abdominal: Soft. Bowel sounds are normal.   There is some discomfort with any palpation of his abdomen.  There is no peritoneal signs of rebound or guarding.  There is no localized tenderness.  Only minor discomfort.   Musculoskeletal: Normal range of motion.   Neurological: He is alert and oriented to person, place, and time.   Skin: Skin is warm and dry.   Psychiatric: His speech is normal and behavior is  normal. Judgment and thought content normal. His mood appears anxious. Cognition and memory are normal.   Nursing note and vitals reviewed.      ED Course        Procedures               Critical Care time:  none        1:44 PM--checked on patient.  He is feeling much better.  He has had 2 L of fluid and 1 dose of Dilaudid and Zofran.  He is still having a lot of cramping and I will give him a Lomotil and then discharge him home when his last liter of fluids is in.  Discussed this with the patient and his family and they are comfortable with this evaluation treatment and discharge plan and all his questions were answered to his satisfaction he is discharged in improved condition.       Results for orders placed or performed during the hospital encounter of 03/02/19 (from the past 24 hour(s))   CBC with platelets differential   Result Value Ref Range    WBC 7.7 4.0 - 11.0 10e9/L    RBC Count 5.27 4.4 - 5.9 10e12/L    Hemoglobin 15.7 13.3 - 17.7 g/dL    Hematocrit 46.0 40.0 - 53.0 %    MCV 87 78 - 100 fl    MCH 29.8 26.5 - 33.0 pg    MCHC 34.1 31.5 - 36.5 g/dL    RDW 12.3 10.0 - 15.0 %    Platelet Count 291 150 - 450 10e9/L    Diff Method Automated Method     % Neutrophils 50.5 %    % Lymphocytes 40.3 %    % Monocytes 7.5 %    % Eosinophils 0.7 %    % Basophils 0.5 %    % Immature Granulocytes 0.5 %    Nucleated RBCs 0 0 /100    Absolute Neutrophil 3.9 1.6 - 8.3 10e9/L    Absolute Lymphocytes 3.1 0.8 - 5.3 10e9/L    Absolute Monocytes 0.6 0.0 - 1.3 10e9/L    Absolute Basophils 0.0 0.0 - 0.2 10e9/L    Abs Immature Granulocytes 0.0 0 - 0.4 10e9/L    Absolute Nucleated RBC 0.0    Comprehensive metabolic panel   Result Value Ref Range    Sodium 140 133 - 144 mmol/L    Potassium 3.7 3.4 - 5.3 mmol/L    Chloride 104 94 - 109 mmol/L    Carbon Dioxide 27 20 - 32 mmol/L    Anion Gap 9 3 - 14 mmol/L    Glucose 87 70 - 99 mg/dL    Urea Nitrogen 7 7 - 30 mg/dL    Creatinine 0.85 0.66 - 1.25 mg/dL    GFR Estimate >90 >60  mL/min/[1.73_m2]    GFR Estimate If Black >90 >60 mL/min/[1.73_m2]    Calcium 9.8 8.5 - 10.1 mg/dL    Bilirubin Total 0.6 0.2 - 1.3 mg/dL    Albumin 4.8 3.4 - 5.0 g/dL    Protein Total 8.4 6.8 - 8.8 g/dL    Alkaline Phosphatase 73 40 - 150 U/L    ALT 19 0 - 70 U/L    AST 16 0 - 45 U/L   Lipase   Result Value Ref Range    Lipase 109 73 - 393 U/L       Medications   0.9% sodium chloride BOLUS (0 mLs Intravenous Stopped 3/2/19 1321)     Followed by   0.9% sodium chloride BOLUS (1,000 mLs Intravenous New Bag 3/2/19 1322)   ondansetron (ZOFRAN) injection 4 mg (4 mg Intravenous Given 3/2/19 1235)   HYDROmorphone (PF) (DILAUDID) injection 0.5 mg (0.5 mg Intravenous Given 3/2/19 1239)   diphenoxylate-atropine (LOMOTIL) 2.5-0.025 MG per tablet 1 tablet (not administered)     2:12 PM--patient feeling much better and requests discharge home.  He has Zofran at home already.  Prescription was sent to the pharmacy.  He was given 1 Lomotil tablet here but I did not recommend any ongoing use.  Assessments & Plan (with Medical Decision Making)   [facility]--23-year-old male who presents emergency room with severe nausea vomiting and diarrhea which started last night and through the night.  There is been no blood in the vomit or diarrhea.  He has generalized crampy abdominal pain with this.  This is not a recurring problem.  There is no associated fever.  The person with him is not ill.  No travel.  Nothing that was suspicious eaten yesterday.  He feels very weak shaky and dehydrated.  Because of this an IV was established and his blood work checked.  He was given 2 L of fluid, Zofran for his nausea and Dilaudid for his headache and stooling.  He is feeling much better and discharged home in improved condition.  He was also given 1 Lomotil tablet which helped his cramping and stooling.  I did not give him any more.  He has Zofran at home.  Encourage clear liquids and a brat diet.  Patient verbalized understanding and is discharged in  improved condition      I have reviewed the nursing notes.    I have reviewed the findings, diagnosis, plan and need for follow up with the patient.             Medication List      Started    ondansetron 4 MG ODT tab  Commonly known as:  ZOFRAN ODT  4 mg, Oral, EVERY 6 HOURS PRN            Final diagnoses:   Gastroenteritis       3/2/2019   [facility] EMERGENCY DEPARTMENT     Marlene, Joon CROWLEY MD  03/02/19 5308

## 2019-03-02 NOTE — ED TRIAGE NOTES
Has been having diarrhea and nausea today, states threw up last night. States his stomach is sore.

## 2019-03-02 NOTE — ED AVS SNAPSHOT
[facility] Emergency Department  [address] [place] [address]  Phone:  [phone]  Fax:  [phone]                                    Steven Romeo   MRN: [id number]    Department:  [facility] Emergency Department   Date of Visit:  3/2/2019           After Visit Summary Signature Page    I have received my discharge instructions, and my questions have been answered. I have discussed any challenges I see with this plan with the nurse or doctor.    ..........................................................................................................................................  Patient/Patient Representative Signature      ..........................................................................................................................................  Patient Representative Print Name and Relationship to Patient    ..................................................               ................................................  Date                                   Time    ..........................................................................................................................................  Reviewed by Signature/Title    ...................................................              ..............................................  Date                                               Time          22EPIC Rev 08/18

## 2019-08-13 ENCOUNTER — TELEPHONE (OUTPATIENT)
Dept: OPHTHALMOLOGY | Facility: CLINIC | Age: 24
End: 2019-08-13

## 2019-08-13 NOTE — TELEPHONE ENCOUNTER
Spoke to mother at 1128  Patient reported vision change to mother last night  Both eyes tunnel vision per mother from son-- been going on for awhile.   Worsening past month  Closing one eye more often per mother-- worsening in past month or so  Able to read each eye individually  Worsening light sensitivity   No eye pain  No headaches    Note to Dr. Nation/facilitator to assist in scheduling f/u exam with Dr. Mary.    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 Yes    By Dallas Mary    Date: 8/13/2019    Are there any new or worsening symptoms? Yes: Eyes are getting worse      Action Taken: Message routed to:  Other: P OPHTHALMOLOGY ADULT [facility]

## 2019-08-15 ENCOUNTER — OFFICE VISIT (OUTPATIENT)
Dept: OPHTHALMOLOGY | Facility: CLINIC | Age: 24
End: 2019-08-15
Attending: OPHTHALMOLOGY
Payer: COMMERCIAL

## 2019-08-15 DIAGNOSIS — H50.05 ALTERNATING ESOTROPIA: Primary | ICD-10-CM

## 2019-08-15 DIAGNOSIS — G91.8 OTHER HYDROCEPHALUS (H): ICD-10-CM

## 2019-08-15 DIAGNOSIS — H53.10 SUBJECTIVE VISUAL DISTURBANCE: Primary | ICD-10-CM

## 2019-08-15 DIAGNOSIS — H53.10 SUBJECTIVE VISUAL DISTURBANCE: ICD-10-CM

## 2019-08-15 DIAGNOSIS — D44.5 PINEAL GERM CELL TUMOR (H): ICD-10-CM

## 2019-08-15 PROCEDURE — 92083 EXTENDED VISUAL FIELD XM: CPT | Mod: ZF | Performed by: OPHTHALMOLOGY

## 2019-08-15 PROCEDURE — G0463 HOSPITAL OUTPT CLINIC VISIT: HCPCS | Mod: ZF | Performed by: TECHNICIAN/TECHNOLOGIST

## 2019-08-15 PROCEDURE — 92133 CPTRZD OPH DX IMG PST SGM ON: CPT | Mod: ZF | Performed by: OPHTHALMOLOGY

## 2019-08-15 ASSESSMENT — CONF VISUAL FIELD
METHOD: COUNTING FINGERS
OS_NORMAL: 1
OD_NORMAL: 1

## 2019-08-15 ASSESSMENT — VISUAL ACUITY
OD_SC: 20/20
METHOD: SNELLEN - LINEAR
OS_SC: 20/20

## 2019-08-15 ASSESSMENT — TONOMETRY
IOP_METHOD: ICARE
OS_IOP_MMHG: 23
OD_IOP_MMHG: 22

## 2019-08-15 NOTE — PROGRESS NOTES
Assessment & Plan     Steven Romeo is a 24 year old male with the following diagnoses:   1. Alternating esotropia    2. Subjective visual disturbance    3. Other hydrocephalus    4. Pineal germ cell tumor (H)         Mr. Romeo is a 24 year old man presenting for follow up of double vision secondary to a brain tumor. He was last seen 8/11/2017 for follow up of double vision. At that visit he was having double vision when looking far to the left or far to the right. He still has double vision in the lateral vision in each eye and it seems to be closer to his central vision compared to his prior visit 2 years ago. He thought it might be monocular, but based on his history might be binocular. He also describes it as a wandering image or a ghosting effect. He also has glare from the headlights that seems to be stable since last visit. He has occasional headaches but those seem to be unchanged. He has had difficulty driving both during the day and at night. He also gets spooked by moving objects in his peripheral field.     POH: light sensitivity  PMH: germinoma near pineal tx with radiation and chemotherapy  FH: none    On exam today his visual acuity without glasses is 20/20 in each eye. His intraocular pressure is 22 in the right eye and 23 in the left eye. His pupils are round and briskly react to light. There is no afferent pupillary defect. His visual fields are full to count fingers in each eye. His confrontational visual fields are full in each eye. His ishihara color vision is 11/11 in each eye. His lids and lashes are normal in each eye. His conjunctiva and sclera is normal in each eye. His cornea is clear in each eye. His anterior chamber is deep and quiet in each eye. His anterior chamber is deep and quiet in each eye. His iris is dilated in each eye. His lens is clear in each eye. His CDR is 0.3 in each eye with clear disc boarders and symmetric mild pallor.     Visual field testing today  showed:  -mild central defect in the right eye  -no pattern of diffuse punctate defects in the left eye    OCT of the rNFL today showed:  119 (6/18/2015) to 97 today in the right eye  116 (6/18/2015) to 96 in the left eye    OCT of the macula today showed:  thinning of the retina compared to last time (6/18/2015) in each eye       It is my impression that the papilledema has not recurred and there is mild evidence of rNFL and macular thinning from old damage to the nerve secondary to increased intracranial pressure.  He has a subtle esotropia and right hypertropia.  This is worse in lateral gazes. I cannot correct him in all gazes.  We discussed patching vs. Prisms.      Follow up 1 year sooner as needed for worsening symptoms.          Attending Physician Attestation:  Complete documentation of historical and exam elements from today's encounter can be found in the full encounter summary report (not reduplicated in this progress note).  I personally obtained the chief complaint(s) and history of present illness.  I confirmed and edited as necessary the review of systems, past medical/surgical history, family history, social history, and examination findings as documented by others; and I examined the patient myself.  I personally reviewed the relevant tests, images, and reports as documented above.  I formulated and edited as necessary the assessment and plan and discussed the findings and management plan with the patient and family. - Dallas Díaz, MS4

## 2019-08-15 NOTE — NURSING NOTE
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  Associated symptoms include Negative for double vision.              Comments     Patient states decrease in peripheral visual fields. Visual field is getting more constricted.   No double vision.     MARGARETTE Ngo 8/15/2019 8:40 AM

## 2019-09-22 ENCOUNTER — HOSPITAL ENCOUNTER (EMERGENCY)
Facility: CLINIC | Age: 24
Discharge: HOME | End: 2019-09-22
Attending: FAMILY MEDICINE | Admitting: FAMILY MEDICINE
Payer: COMMERCIAL

## 2019-09-22 VITALS
DIASTOLIC BLOOD PRESSURE: 66 MMHG | TEMPERATURE: 96.8 F | SYSTOLIC BLOOD PRESSURE: 102 MMHG | OXYGEN SATURATION: 99 % | RESPIRATION RATE: 16 BRPM

## 2019-09-22 DIAGNOSIS — S61.011A LACERATION OF RIGHT THUMB WITHOUT FOREIGN BODY WITHOUT DAMAGE TO NAIL, INITIAL ENCOUNTER: ICD-10-CM
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
## 2019-09-22 DIAGNOSIS — Z23 NEED FOR PROPHYLACTIC VACCINATION AND INOCULATION AGAINST CHOLERA ALONE: ICD-10-CM

## 2019-09-22 PROCEDURE — 90715 TDAP VACCINE 7 YRS/> IM: CPT | Performed by: FAMILY MEDICINE

## 2019-09-22 PROCEDURE — 12001 RPR S/N/AX/GEN/TRNK 2.5CM/<: CPT | Mod: Z6 | Performed by: FAMILY MEDICINE

## 2019-09-22 PROCEDURE — 12001 RPR S/N/AX/GEN/TRNK 2.5CM/<: CPT | Performed by: FAMILY MEDICINE

## 2019-09-22 PROCEDURE — 25000128 H RX IP 250 OP 636: Performed by: FAMILY MEDICINE

## 2019-09-22 PROCEDURE — 90471 IMMUNIZATION ADMIN: CPT

## 2019-09-22 PROCEDURE — 27210282 ZZH ADHESIVE DERMABOND SKIN: Performed by: FAMILY MEDICINE

## 2019-09-22 PROCEDURE — 99283 EMERGENCY DEPT VISIT LOW MDM: CPT | Mod: 25 | Performed by: FAMILY MEDICINE

## 2019-09-22 RX ADMIN — CLOSTRIDIUM TETANI TOXOID ANTIGEN (FORMALDEHYDE INACTIVATED), CORYNEBACTERIUM DIPHTHERIAE TOXOID ANTIGEN (FORMALDEHYDE INACTIVATED), BORDETELLA PERTUSSIS TOXOID ANTIGEN (GLUTARALDEHYDE INACTIVATED), BORDETELLA PERTUSSIS FILAMENTOUS HEMAGGLUTININ ANTIGEN (FORMALDEHYDE INACTIVATED), BORDETELLA PERTUSSIS PERTACTIN ANTIGEN, AND BORDETELLA PERTUSSIS FIMBRIAE 2/3 ANTIGEN 0.5 ML: 5; 2; 2.5; 5; 3; 5 INJECTION, SUSPENSION INTRAMUSCULAR at 01:08

## 2019-09-22 NOTE — ED PROVIDER NOTES
ED Provider Note     Steven Romeo  [id number]  September 22, 2019      CC:     Chief Complaint   Patient presents with     Laceration          History is obtained from the patient.    HPI: Steven Romeo is a 24 year old male presenting with laceration to the dorsal aspect of the right thumb.  Patient cut his finger on the edge of a coffee can.  He was making a windmill using the coffee can and cut himself.  He has a history of pineal tumor, and is gone through chemotherapy.  He has a history of anxiety.  Patient is accompanied by his mother.  His last tetanus shot was in 2007.  Patient states that it was bleeding quite heavily and he went through for napkins.    PMH/Problem List:   Past Medical History:   Diagnosis Date     Cancer (H)      Hydrocephalus 2015     Infectious mononucleosis      Other chronic pain      Pineal tumor 2015     Pneumonia 9/13/2011     Pneumothorax on left 11/2014     Spontaneous pneumothorax 11/9/2014       PSH:   Past Surgical History:   Procedure Laterality Date     INSERT CHEST TUBE Left 11/10/2014    Procedure: INSERT CHEST TUBE;  Surgeon: Damaso Westbrook MD;  [facility]     REMOVE PORT VASCULAR ACCESS Right 10/12/2015    Procedure: REMOVE PORT VASCULAR ACCESS;  Surgeon: Renato Arroyo MD;  [facility]      THORACOSCOPIC BLEBECTOMY Left 11/13/2014    Procedure: THORACOSCOPIC BLEBECTOMY;  Surgeon: Damaso Westbrook MD;  [facility]     THORACOSCOPIC PLEURODESIS Left 11/13/2014    Procedure: THORACOSCOPIC PLEURODESIS;  Surgeon: Damaso Westbrook MD;  [facility]     VENTRICULOSCOPY Right 3/25/2015    Procedure: VENTRICULOSCOPY;  Surgeon: Vasile Boyd MD;  Location: [facility]       MEDS:   No current facility-administered medications on file prior to encounter.   Current Outpatient Medications on File Prior to Encounter:  ACETAMINOPHEN PO Take 1,000 mg by mouth every 6 hours as  needed for pain   Ascorbic Acid (VITAMIN C PO)    diphenhydrAMINE (BENADRYL) 25 MG tablet Take 50mg ONE HOUR PRIOR to your MRI and then every 6 hours for two doses following your scan and then as directed   guaiFENesin (MUCINEX) 600 MG 12 hr tablet Take 2 tablets (1,200 mg) by mouth 2 times daily as needed for congestion   ketorolac (TORADOL) 10 MG tablet Take 1 tablet (10 mg) by mouth every 6 hours as needed   Ahuzdfmyo-CHS-KC-APAP (YANET-SELTZER PLUS COLD & FLU PO)        Allergies: Gadolinium and Neupogen [filgrastim]    Triage and nursing notes were reviewed.    ROS: All other systems were reviewed and are negative    Physical Exam:  Vitals:    09/22/19 0037   BP: 107/60   Resp: 18   TempSrc: Oral   SpO2: 100%     GENERAL APPEARANCE: Alert, anxious, no distress  HEAD: atraumatic  EYES: PER  HENT: Normal external exam  RESP: Normal respiratory effort  EXT: The dorsum of the right thumb reveals a half a centimeter laceration transversely just proximal to the nailbed.  There is a slice and bleeding has been controlled  SKIN: as above      Labs/Imaging Results:  No results found for this or any previous visit (from the past 24 hour(s)).      Procedure Note: The patient's wound was washed, and dried.  The wound measures approximately 0.5 cm and was closed with skin glue      Impression:  Final diagnoses:   Laceration of right thumb         ED Course & Medical Decision Making (Plan):  Steven Romeo is a 24 year old male with a minor laceration to the dorsal aspect of the right thumb.  Patient was able to control the bleeding with pressure.  He did wash it at home.  The wound was closed with skin glue, and aftercare instructions were discussed.  Patient received a tetanus/pertussis update.    Written after-visit summary and instructions were given at the time of discharge.          Disclaimer: This note consists of words and symbols derived from keyboarding and dictation using voice recognition software.  As a result,  there may be errors that have gone undetected.  Please consider this when interpreting information found in this note.       Toi Mina MD  09/22/19 0100       Toi Mina MD  09/26/19 1574

## 2019-09-22 NOTE — ED AVS SNAPSHOT
[facility] Emergency Department  [address] [place] [address]  Phone:  [phone]  Fax:  [phone]                                    Steven Romeo   MRN: [id number]    Department:  [facility] Emergency Department   Date of Visit:  9/22/2019           After Visit Summary Signature Page    I have received my discharge instructions, and my questions have been answered. I have discussed any challenges I see with this plan with the nurse or doctor.    ..........................................................................................................................................  Patient/Patient Representative Signature      ..........................................................................................................................................  Patient Representative Print Name and Relationship to Patient    ..................................................               ................................................  Date                                   Time    ..........................................................................................................................................  Reviewed by Signature/Title    ...................................................              ..............................................  Date                                               Time          22EPIC Rev 08/18

## 2019-09-22 NOTE — ED TRIAGE NOTES
Pt presents with concerns of a laceration on the right thumb.  Pt states that he cut it on dirty metal about 45 minutes ago.  Pt did wash the laceration out prior to arrival.

## 2019-09-22 NOTE — DISCHARGE INSTRUCTIONS
You had a laceration to the thumb which we closed with glue.  Please see the attached handout.  Do not use ointments or bandage.  Wear gloves at work for the next couple of days to keep the thumb clean and dry.  Follow-up as needed.  You received a tetanus and pertussis update today.

## 2019-10-07 ENCOUNTER — HOSPITAL ENCOUNTER (EMERGENCY)
Facility: CLINIC | Age: 24
Discharge: HOME | End: 2019-10-07
Attending: FAMILY MEDICINE | Admitting: FAMILY MEDICINE
Payer: COMMERCIAL

## 2019-10-07 VITALS
OXYGEN SATURATION: 98 % | SYSTOLIC BLOOD PRESSURE: 120 MMHG | DIASTOLIC BLOOD PRESSURE: 70 MMHG | TEMPERATURE: 97 F | HEART RATE: 77 BPM | RESPIRATION RATE: 16 BRPM

## 2019-10-07 DIAGNOSIS — J01.00 ACUTE MAXILLARY SINUSITIS, RECURRENCE NOT SPECIFIED: ICD-10-CM

## 2019-10-07 DIAGNOSIS — J06.9 VIRAL URI WITH COUGH: ICD-10-CM

## 2019-10-07 PROCEDURE — 99282 EMERGENCY DEPT VISIT SF MDM: CPT | Performed by: FAMILY MEDICINE

## 2019-10-07 PROCEDURE — 99284 EMERGENCY DEPT VISIT MOD MDM: CPT | Mod: Z6 | Performed by: FAMILY MEDICINE

## 2019-10-07 RX ORDER — GUAIFENESIN 600 MG/1
1200 TABLET, EXTENDED RELEASE ORAL 2 TIMES DAILY PRN
Qty: 40 TABLET | Refills: 0 | COMMUNITY
Start: 2019-10-07 | End: 2021-05-21

## 2019-10-07 NOTE — LETTER
[facility]  Emergency Department  [address] [place], [place] [address] [phone] fax      10/7/2019      Steven Romeo  [address] [facility] [place] [address] [phone] (home)         TO WHOM IT MAY CONCERN:      Steven was seen in the Emergency Department on 10/7/2019.    Please excuse from work due to illness.    He may return, without restrictions, when improved.      Sincerely,        Prabhakar Potter MD  Emergency Physician

## 2019-10-07 NOTE — ED PROVIDER NOTES
History     Chief Complaint   Patient presents with     Sinusitis     HPI  Steven Romeo is a 24 year old male who presents to the ED with maxillary pain and pressure that worsened over the past couple of days.  He works outside doing lawn care and landscaping and its been quite rainy and miserable the past week or 2.  He thinks it caught up with him.  He is got a lot of posterior drainage and blowing out lots of yellowish mucus.  No fevers but overall feels rundown.  Called in sick to work today.  Here with his significant other.    Allergies:  Allergies   Allergen Reactions     Gadolinium Itching     Redness/Itching immediately with Gadolinium injection.  Patient reacts even with premedication.  ksa 4-1--2016     Neupogen [Filgrastim]        Problem List:    Patient Active Problem List    Diagnosis Date Noted     Nightmares 04/09/2018     Priority: Medium     FREDA (generalized anxiety disorder) 04/09/2018     Priority: Medium     Panic attacks 04/09/2018     Priority: Medium     PTSD (post-traumatic stress disorder) 04/09/2018     Priority: Medium     Callus of foot-right 04/05/2018     Priority: Medium     Status post chemotherapy 07/01/2015     Priority: Medium     Health Care Home 06/30/2015     Priority: Medium     Status:  Declined  Care Coordinator:  Elvie Walsh    See Letters for [facility] Care Plan  Date:  June 30, 2015         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     Priority: Medium     Hydrocephalus (H)      Priority: Medium     S/p third ventriculostomy 3/25/15       Tobacco abuse 11/09/2014     Priority: Medium     Moderate recurrent major depression (H) 12/03/2013     Priority: Medium        Past Medical History:    Past Medical History:   Diagnosis Date     Cancer (H)      Hydrocephalus (H) 2015     Infectious mononucleosis      Other chronic pain      Pineal tumor 2015     Pneumonia 9/13/2011     Pneumothorax on left 11/2014     Spontaneous pneumothorax 11/9/2014       Past Surgical History:    Past  Surgical History:   Procedure Laterality Date     INSERT CHEST TUBE Left 11/10/2014    Procedure: INSERT CHEST TUBE;  Surgeon: Damaso Westbrook MD;  [facility]     REMOVE PORT VASCULAR ACCESS Right 10/12/2015    Procedure: REMOVE PORT VASCULAR ACCESS;  Surgeon: Renato Arroyo MD;  [facility]      THORACOSCOPIC BLEBECTOMY Left 2014    Procedure: THORACOSCOPIC BLEBECTOMY;  Surgeon: Damaso Westbrook MD;  [facility]     THORACOSCOPIC PLEURODESIS Left 2014    Procedure: THORACOSCOPIC PLEURODESIS;  Surgeon: Damaso Westbrook MD;  [facility]     VENTRICULOSCOPY Right 3/25/2015    Procedure: VENTRICULOSCOPY;  Surgeon: Vasile Boyd MD;  [facility]       Family History:    Family History   Problem Relation Age of Onset     Family History Negative Other        Social History:  Marital Status:  Single [1]  Social History     Tobacco Use     Smoking status: Current Every Day Smoker     Packs/day: 0.25     Years: 1.00     Pack years: 0.25     Types: Cigarettes     Last attempt to quit: 2014     Years since quittin.8     Smokeless tobacco: Never Used   Substance Use Topics     Alcohol use: Yes     Alcohol/week: 0.0 standard drinks     Comment: occasionally     Drug use: Yes     Types: Methamphetamines        Medications:    ACETAMINOPHEN PO  amoxicillin-clavulanate (AUGMENTIN) 875-125 MG tablet  Ascorbic Acid (VITAMIN C PO)  diphenhydrAMINE (BENADRYL) 25 MG tablet  guaiFENesin (MUCINEX) 600 MG 12 hr tablet  ketorolac (TORADOL) 10 MG tablet  Reuzqiljp-OYQ-CJ-APAP (YANET-SELTZER PLUS COLD & FLU PO)          Review of Systems   All other systems reviewed and are negative.      Physical Exam   BP: 128/79  Pulse: 71  Temp: 97  F (36.1  C)  Resp: 18  SpO2: 100 %      Physical Exam  Constitutional:       General: He is in acute distress (mild).   HENT:      Nose: Congestion present.      Comments: Cloudy discharge bilaterally.  Moderate tenderness of the  maxillary sinus.     Mouth/Throat:      Mouth: Mucous membranes are moist.      Pharynx: Oropharynx is clear.   Cardiovascular:      Rate and Rhythm: Normal rate and regular rhythm.   Pulmonary:      Effort: Pulmonary effort is normal.      Breath sounds: Wheezing (few faint, smoker) and rhonchi (few faint) present.   Skin:     General: Skin is warm.   Neurological:      General: No focal deficit present.      Mental Status: He is alert.   Psychiatric:         Mood and Affect: Mood normal.         ED Course  (with Medical Decision Making)    24-year-old smoker with 2-day history of increasing maxillary pain and pressure with posterior drainage and thick yellow mucus up.  Has had some problems with sinus infections in the past.  Works outside in the cold rain and dust.    Think he does have an acute maxillary sinusitis and when to treat him with Augmentin.  Add Mucinex and suggested nasal saline irrigation.  Recheck in clinic if persistent problems.  Work note written.              Procedures               Critical Care time:  none               No results found for this or any previous visit (from the past 24 hour(s)).    Medications - No data to display    Assessments & Plan     I have reviewed the nursing notes.    I have reviewed the findings, diagnosis, plan and need for follow up with the patient.       New Prescriptions    AMOXICILLIN-CLAVULANATE (AUGMENTIN) 875-125 MG TABLET    Take 1 tablet by mouth 2 times daily for 10 days       Final diagnoses:   Acute maxillary sinusitis, recurrence not specified       10/7/2019   [facility] EMERGENCY DEPARTMENT     Prabhakar Betancourt MD  10/07/19 0645

## 2019-10-07 NOTE — ED TRIAGE NOTES
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

## 2019-10-07 NOTE — DISCHARGE INSTRUCTIONS
Take the Augmentin twice a day as directed.  The Mucinex as needed to help thin the secretions.  Saline nasal irrigation can be quite helpful.  NeilMed is one brand that makes a kit which includes an irrigation bottle and 50 pre-mixed saline packets.  Use distilled water, not tap water.   Tylenol/ibuprofen as needed for discomfort.  It was nice visiting with both of you this morning.  I hope you feel better soon.  Go home and rest.    Thank you for choosing [facility]. We appreciate the opportunity to meet your urgent medical needs. Please let us know if we could have done anything to make your stay more satisfying.    After discharge, please closely monitor for any new or worsening symptoms. Return to the Emergency Department if you develop any acute worsening signs or symptoms.    If you had lab work, cultures or imaging studies done during your stay, the final results may still be pending. We will call you if your plan of care needs to change. However, if you are not improving as expected, please follow up with your primary care provider or clinic.     Start any prescription medications that were prescribed to you and take them as directed.     Please see additional handouts that may be pertinent to your condition.

## 2019-10-07 NOTE — ED AVS SNAPSHOT
[facility] Emergency Department  [address] [place] [address]  Phone:  [phone]  Fax:  [phone]                                    Steven Romeo   MRN: [id number]    Department:  [facility] Emergency Department   Date of Visit:  10/7/2019           After Visit Summary Signature Page    I have received my discharge instructions, and my questions have been answered. I have discussed any challenges I see with this plan with the nurse or doctor.    ..........................................................................................................................................  Patient/Patient Representative Signature      ..........................................................................................................................................  Patient Representative Print Name and Relationship to Patient    ..................................................               ................................................  Date                                   Time    ..........................................................................................................................................  Reviewed by Signature/Title    ...................................................              ..............................................  Date                                               Time          22EPIC Rev 08/18

## 2019-11-05 ENCOUNTER — HEALTH MAINTENANCE LETTER (OUTPATIENT)
Age: 24
End: 2019-11-05

## 2019-12-31 ENCOUNTER — HOSPITAL ENCOUNTER (EMERGENCY)
Facility: CLINIC | Age: 24
Discharge: HOME | End: 2019-12-31
Attending: EMERGENCY MEDICINE | Admitting: EMERGENCY MEDICINE
Payer: COMMERCIAL

## 2019-12-31 ENCOUNTER — APPOINTMENT (OUTPATIENT)
Dept: CT IMAGING | Facility: CLINIC | Age: 24
End: 2019-12-31
Attending: EMERGENCY MEDICINE
Payer: COMMERCIAL

## 2019-12-31 VITALS
OXYGEN SATURATION: 100 % | RESPIRATION RATE: 16 BRPM | DIASTOLIC BLOOD PRESSURE: 70 MMHG | TEMPERATURE: 98.4 F | SYSTOLIC BLOOD PRESSURE: 118 MMHG | HEART RATE: 70 BPM

## 2019-12-31 DIAGNOSIS — R51.9 LEFT-SIDED HEADACHE: ICD-10-CM

## 2019-12-31 PROCEDURE — 25800030 ZZH RX IP 258 OP 636: Performed by: EMERGENCY MEDICINE

## 2019-12-31 PROCEDURE — 70450 CT HEAD/BRAIN W/O DYE: CPT

## 2019-12-31 PROCEDURE — 96375 TX/PRO/DX INJ NEW DRUG ADDON: CPT | Performed by: EMERGENCY MEDICINE

## 2019-12-31 PROCEDURE — 25000128 H RX IP 250 OP 636: Performed by: EMERGENCY MEDICINE

## 2019-12-31 PROCEDURE — 99284 EMERGENCY DEPT VISIT MOD MDM: CPT | Mod: 25 | Performed by: EMERGENCY MEDICINE

## 2019-12-31 PROCEDURE — 99284 EMERGENCY DEPT VISIT MOD MDM: CPT | Mod: Z6 | Performed by: EMERGENCY MEDICINE

## 2019-12-31 PROCEDURE — 96365 THER/PROPH/DIAG IV INF INIT: CPT | Performed by: EMERGENCY MEDICINE

## 2019-12-31 RX ORDER — MAGNESIUM SULFATE 1 G/100ML
1 INJECTION INTRAVENOUS ONCE
Status: COMPLETED | OUTPATIENT
Start: 2019-12-31 | End: 2019-12-31

## 2019-12-31 RX ORDER — DEXAMETHASONE SODIUM PHOSPHATE 10 MG/ML
10 INJECTION, SOLUTION INTRAMUSCULAR; INTRAVENOUS ONCE
Status: COMPLETED | OUTPATIENT
Start: 2019-12-31 | End: 2019-12-31

## 2019-12-31 RX ORDER — KETOROLAC TROMETHAMINE 30 MG/ML
30 INJECTION, SOLUTION INTRAMUSCULAR; INTRAVENOUS ONCE
Status: COMPLETED | OUTPATIENT
Start: 2019-12-31 | End: 2019-12-31

## 2019-12-31 RX ADMIN — KETOROLAC TROMETHAMINE 30 MG: 30 INJECTION, SOLUTION INTRAMUSCULAR at 19:57

## 2019-12-31 RX ADMIN — MAGNESIUM SULFATE HEPTAHYDRATE 1 G: 1 INJECTION, SOLUTION INTRAVENOUS at 22:01

## 2019-12-31 RX ADMIN — DEXAMETHASONE SODIUM PHOSPHATE 10 MG: 10 INJECTION, SOLUTION INTRAMUSCULAR; INTRAVENOUS at 21:58

## 2019-12-31 RX ADMIN — SODIUM CHLORIDE, POTASSIUM CHLORIDE, SODIUM LACTATE AND CALCIUM CHLORIDE 1000 ML: 600; 310; 30; 20 INJECTION, SOLUTION INTRAVENOUS at 21:11

## 2019-12-31 NOTE — ED AVS SNAPSHOT
[facility] Emergency Department  [address] [place] [address]  Phone:  [phone]  Fax:  [phone]                                    Steven Romeo   MRN: [id number]    Department:  [facility] Emergency Department   Date of Visit:  12/31/2019           After Visit Summary Signature Page    I have received my discharge instructions, and my questions have been answered. I have discussed any challenges I see with this plan with the nurse or doctor.    ..........................................................................................................................................  Patient/Patient Representative Signature      ..........................................................................................................................................  Patient Representative Print Name and Relationship to Patient    ..................................................               ................................................  Date                                   Time    ..........................................................................................................................................  Reviewed by Signature/Title    ...................................................              ..............................................  Date                                               Time          22EPIC Rev 08/18

## 2020-01-01 ASSESSMENT — ENCOUNTER SYMPTOMS
WEAKNESS: 0
SEIZURES: 0
TREMORS: 0
LIGHT-HEADEDNESS: 0
NUMBNESS: 0
HEADACHES: 1
DIZZINESS: 0
FACIAL ASYMMETRY: 0
SPEECH DIFFICULTY: 0

## 2020-01-01 NOTE — DISCHARGE INSTRUCTIONS
Head CT is normal.   In addition the paranasal sinuses are clear with no signs for sinus infection triggering headache.  Headache is a typical for migraine headache.  You have received a medication through the IV that helped with some menstrual migraine headache pain.  Upon returning home recommend that you maintain good hydration, sleep, avoid strenuous activity for 24 hours.  In addition the headache could also been triggered by eyestrain has been working on detailed electronics.

## 2020-01-02 NOTE — ED PROVIDER NOTES
History     Chief Complaint   Patient presents with     Headache     HPI  Steven Romeo is a 24 year old male who presents with left-sided headache.  Patient's primary concern is that he has had hydrocephalus in the past requiring shunting also has had excision pineal tumor.  Has had no associated neurologic symptoms.  Headache is behind left temple frontal and parietal.  Stays well localized.  Has had some light sensitivity and nausea.  He has had no falls or head trauma.  No associated fever or chills.  No nuchal rigidity.  Is complaining of no motor, sensory or coordination changes.  Headache is been intractable to over-the-counter medications tried at home such as Tylenol and ibuprofen.  Is been putting ice packs to the left forehead temporal area with some improvement.  Currently rates headache 6-8/10 intensity.  He has had no sinus congestion/URI symptoms.  No sore throat.  No neck discomfort.    Allergies:  Allergies   Allergen Reactions     Gadolinium Itching     Redness/Itching immediately with Gadolinium injection.  Patient reacts even with premedication.  [facility] 4-1--2016     Neupogen [Filgrastim]        Problem List:    Patient Active Problem List    Diagnosis Date Noted     Nightmares 04/09/2018     Priority: Medium     FREDA (generalized anxiety disorder) 04/09/2018     Priority: Medium     Panic attacks 04/09/2018     Priority: Medium     PTSD (post-traumatic stress disorder) 04/09/2018     Priority: Medium     Callus of foot-right 04/05/2018     Priority: Medium     Status post chemotherapy 07/01/2015     Priority: Medium     Health Care Home 06/30/2015     Priority: Medium     Status:  Declined  Care Coordinator:  Elvie Walsh    See Letters for [facility] Care Plan  Date:  June 30, 2015         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     Priority: Medium     Hydrocephalus (H)      Priority: Medium     S/p third ventriculostomy 3/25/15       Tobacco abuse 11/09/2014     Priority: Medium     Moderate recurrent  major depression (H) 2013     Priority: Medium        Past Medical History:    Past Medical History:   Diagnosis Date     Cancer (H)      Hydrocephalus (H)      Infectious mononucleosis      Other chronic pain      Pineal tumor 2015     Pneumonia 2011     Pneumothorax on left 2014     Spontaneous pneumothorax 2014       Past Surgical History:    Past Surgical History:   Procedure Laterality Date     INSERT CHEST TUBE Left 11/10/2014    Procedure: INSERT CHEST TUBE;  Surgeon: Damaso Westbrook MD;  [facility]     REMOVE PORT VASCULAR ACCESS Right 10/12/2015    Procedure: REMOVE PORT VASCULAR ACCESS;  Surgeon: Renato Arroyo MD;  [facility]      THORACOSCOPIC BLEBECTOMY Left 2014    Procedure: THORACOSCOPIC BLEBECTOMY;  Surgeon: Damaso Westbrook MD;  [facility]     THORACOSCOPIC PLEURODESIS Left 2014    Procedure: THORACOSCOPIC PLEURODESIS;  Surgeon: Damaso Westbrook MD;  [facility]     VENTRICULOSCOPY Right 3/25/2015    Procedure: VENTRICULOSCOPY;  Surgeon: Vasile Boyd MD;  [facility]       Family History:    Family History   Problem Relation Age of Onset     Family History Negative Other        Social History:  Marital Status:  Single [1]  Social History     Tobacco Use     Smoking status: Current Every Day Smoker     Packs/day: 0.25     Years: 1.00     Pack years: 0.25     Types: Cigarettes     Last attempt to quit: 2014     Years since quittin.0     Smokeless tobacco: Never Used   Substance Use Topics     Alcohol use: Yes     Alcohol/week: 0.0 standard drinks     Comment: occasionally     Drug use: Yes     Types: Methamphetamines        Medications:    ACETAMINOPHEN PO  Ascorbic Acid (VITAMIN C PO)  diphenhydrAMINE (BENADRYL) 25 MG tablet  guaiFENesin (MUCINEX) 600 MG 12 hr tablet  ketorolac (TORADOL) 10 MG tablet  Kqaabdner-FII-MF-APAP (YANET-SELTZER PLUS COLD & FLU PO)          Review of Systems    Neurological: Positive for headaches. Negative for dizziness, tremors, seizures, facial asymmetry, speech difficulty, weakness, light-headedness and numbness.   All other systems reviewed and are negative.      Physical Exam   BP: (!) 127/92  Pulse: 68  Temp: 98.4  F (36.9  C)  Resp: 18  SpO2: 100 %      Physical Exam  Vitals signs and nursing note reviewed. Exam conducted with a chaperone present.   Constitutional:       General: He is not in acute distress.     Appearance: He is not diaphoretic.   HENT:      Head: Atraumatic.      Nose: No congestion or rhinorrhea.      Mouth/Throat:      Mouth: Mucous membranes are moist.      Pharynx: No oropharyngeal exudate or posterior oropharyngeal erythema.   Eyes:      General: No scleral icterus.     Pupils: Pupils are equal, round, and reactive to light.   Neck:      Musculoskeletal: Normal range of motion and neck supple. No neck rigidity.   Cardiovascular:      Rate and Rhythm: Normal rate.      Pulses: Normal pulses.      Heart sounds: Normal heart sounds.   Pulmonary:      Effort: No respiratory distress.      Breath sounds: Normal breath sounds.   Abdominal:      General: Bowel sounds are normal.      Palpations: Abdomen is soft.      Tenderness: There is no abdominal tenderness.   Musculoskeletal:         General: No tenderness.   Lymphadenopathy:      Cervical: No cervical adenopathy.   Skin:     General: Skin is warm.      Findings: No rash.   Neurological:      General: No focal deficit present.      Mental Status: He is alert.      Cranial Nerves: Cranial nerves are intact.      Sensory: Sensation is intact.      Motor: Motor function is intact.      Coordination: Coordination is intact.      Gait: Gait is intact.      Deep Tendon Reflexes: Reflexes are normal and symmetric.         ED Course        Procedures          Results for orders placed or performed during the hospital encounter of 12/31/19 (from the past 48 hour(s))   CT Head w/o Contrast     Narrative    CT OF THE HEAD WITHOUT CONTRAST 12/31/2019 9:14 PM     COMPARISON: Brain MR 9/13/2017.    HISTORY: Severe left-sided headache. Hydrocephalus. Status post  resection of pineal germ cell tumor.    TECHNIQUE: Axial CT images of the head from the skull base to the  vertex were acquired without IV contrast.    FINDINGS: Postoperative changes consisting of a tiny right frontal  craniotomy again noted. The ventricles and basal cisterns are within  normal limits in configuration. There is no midline shift. There are  no extra-axial fluid collections.  Gray-white differentiation is well  maintained.    No intracranial hemorrhage, mass or recent infarct.    The visualized paranasal sinuses are well-aerated. There is no  mastoiditis. There are no fractures of the visualized bones.      Impression    IMPRESSION: Normal head CT. No evidence for hydrocephalus.      Radiation dose for this scan was reduced using automated exposure  control, adjustment of the mA and/or kV according to patient size, or  iterative reconstruction technique    SHELLIE LOPEZ MD                Medications   lactated ringers BOLUS 1,000 mL (0 mLs Intravenous Stopped 12/31/19 2158)   ketorolac (TORADOL) injection 30 mg (30 mg Intravenous Given 12/31/19 1957)   magnesium sulfate 1 gm in 100mL D5W intermittent infusion (0 g Intravenous Stopped 12/31/19 2234)   dexamethasone PF (DECADRON) injection 10 mg (10 mg Intravenous Given 12/31/19 2158)       Assessments & Plan (with Medical Decision Making)  Patient presents with severe left sided headache.  His primary concern coming in was that he has had previous hydrocephalus and pineal tumor resection.  He and his mother are requesting CT imaging to make sure there is no acute CNS cause for his headache.  He had a normal neurological examination but did display some photophobia.  He denies any trauma.  Is having no infectious symptoms.  With age and gender unlikely giant cell arteritis.  Had no  signs or symptoms suggestive for sinus infection, ear infection./  Head CT as noted above was normal.  Prior postoperative craniotomy findings noted.  Patient was reassured.  Feeling much better after magnesium sulfate, Toradol and Decadron.  Headache was virtually gone when he was discharged.     I have reviewed the nursing notes.    I have reviewed the findings, diagnosis, plan and need for follow up with the patient.      Discharge Medication List as of 12/31/2019 10:36 PM          Final diagnoses:   Left-sided headache       12/31/2019   [facility] EMERGENCY DEPARTMENT     Óscar Lewis DO  01/01/20 2146

## 2020-01-23 DIAGNOSIS — D44.5 PINEAL GERM CELL TUMOR (H): Primary | Chronic | ICD-10-CM

## 2020-02-11 ENCOUNTER — HOSPITAL ENCOUNTER (OUTPATIENT)
Dept: MRI IMAGING | Facility: CLINIC | Age: 25
Discharge: HOME | End: 2020-02-11
Attending: PEDIATRICS | Admitting: PEDIATRICS
Payer: COMMERCIAL

## 2020-02-11 ENCOUNTER — OFFICE VISIT (OUTPATIENT)
Dept: PEDIATRIC HEMATOLOGY/ONCOLOGY | Facility: CLINIC | Age: 25
End: 2020-02-11
Attending: PEDIATRICS
Payer: COMMERCIAL

## 2020-02-11 VITALS
BODY MASS INDEX: 20.65 KG/M2 | SYSTOLIC BLOOD PRESSURE: 110 MMHG | HEIGHT: 68 IN | WEIGHT: 136.24 LBS | DIASTOLIC BLOOD PRESSURE: 75 MMHG | OXYGEN SATURATION: 99 % | RESPIRATION RATE: 20 BRPM | HEART RATE: 66 BPM

## 2020-02-11 DIAGNOSIS — D44.5 PINEAL GERM CELL TUMOR (H): Chronic | ICD-10-CM

## 2020-02-11 DIAGNOSIS — D44.5 PINEAL GERM CELL TUMOR (H): Primary | ICD-10-CM

## 2020-02-11 PROCEDURE — G0463 HOSPITAL OUTPT CLINIC VISIT: HCPCS | Mod: 25

## 2020-02-11 PROCEDURE — 70551 MRI BRAIN STEM W/O DYE: CPT

## 2020-02-11 ASSESSMENT — MIFFLIN-ST. JEOR: SCORE: 1579.25

## 2020-02-11 ASSESSMENT — PAIN SCALES - GENERAL: PAINLEVEL: NO PAIN (0)

## 2020-02-11 ASSESSMENT — ENCOUNTER SYMPTOMS
RESPIRATORY NEGATIVE: 1
GASTROINTESTINAL NEGATIVE: 1
PSYCHIATRIC NEGATIVE: 1
ENDOCRINE NEGATIVE: 1
EYES NEGATIVE: 1
ALLERGIC/IMMUNOLOGIC NEGATIVE: 1
CONSTITUTIONAL NEGATIVE: 1
MUSCULOSKELETAL NEGATIVE: 1
HEMATOLOGIC/LYMPHATIC NEGATIVE: 1
NEUROLOGICAL NEGATIVE: 1
CARDIOVASCULAR NEGATIVE: 1

## 2020-02-11 NOTE — LETTER
2/11/2020      RE: Steven Romeo  [address] [facility] [id number]          Pediatric Hematology/Oncology Clinic Note     HPI-  Steven Rmoeo is a 24 year old male with a history of pineal germ cell tumor who presents to the clinic with his mother and siblings for a follow up, lab, and MRI results.    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 States that he is doing well at his job. Denies taking any new medications. No N/V, loss of appetite, unexplanied weigth change, unexplained fevers, cough, chest pain, abdominal pain, constipation, diarrhea, urinary symptoms, or rash.      History of blood vessel burst on his left eye on New Years. Was unsure of the exact cause but attributed the cause towards eye strain during snow shoveling. Denies trauma to the eye or eye infection. Had an eye scan at their local ED and the results was normal. No follow up with the ophthalmologist was scheduled. Symptoms resolved within few days and did not returned. It was the first time it had happened. Denies blurry vision, dizziness, or headaches. His brother has a picture on his cell phone and it seems to be subconjunctival hemorrhage.      He had a recent dental work done on December 2019. He was given IV sedation due his anxiety (PTSD) during the dental work. Currently has 6 crowns placed. Was planning on doing a root canal however states that his dental scan showed bone loss and the dentist did not feel comfortable proceeding with the procedure until seen by heme/onc provider. Mom is wondering if his dental problems related to his chemotherapy and radiotherapy.     His mood is normal. Was slightly crabby due to waiting but otherwise normal. Denies seeing mental health provider.     Steven and Steven's mom also wished to discuss about Steven's insurance status as he turns 26 years old.     Review of scan is shown to be normal. Negative for tumors. No evidence  "of recurring tumor and no evidence hydrocephalus.     Fam/Soc: There are obvious current family stressors. Mom remarks that she \"does not talk about [employer] anymore.\"    History was obtained from patient and patient's parent.       Allergies   Allergen Reactions     Gadolinium Itching     Redness/Itching immediately with Gadolinium injection.  Patient reacts even with premedication.  [facility] 4-1--2016     Neupogen [Filgrastim]        Current Outpatient Medications   Medication     ACETAMINOPHEN PO     Ascorbic Acid (VITAMIN C PO)     diphenhydrAMINE (BENADRYL) 25 MG tablet     Oeaahlcrw-IOE-FK-APAP (YANET-SELTZER PLUS COLD & FLU PO)     guaiFENesin (MUCINEX) 600 MG 12 hr tablet     ketorolac (TORADOL) 10 MG tablet     No current facility-administered medications for this visit.        Past Medical History:   Diagnosis Date     Cancer (H)     germinoma brain cancer     Hydrocephalus (H) 2015    See MRI/CT     Infectious mononucleosis     age 7     Other chronic pain     headaches     Pineal tumor 2015    see MRI/CT     Pneumonia 9/13/2011     Pneumothorax on left 11/2014    spontaneous     Spontaneous pneumothorax 11/9/2014       Past Surgical History:   Procedure Laterality Date     INSERT CHEST TUBE Left 11/10/2014    Procedure: INSERT CHEST TUBE;  Surgeon: Damaso Westbrook MD;  [facility]     REMOVE PORT VASCULAR ACCESS Right 10/12/2015    Procedure: REMOVE PORT VASCULAR ACCESS;  Surgeon: Renato Arroyo MD;  [facility]      THORACOSCOPIC BLEBECTOMY Left 11/13/2014    Procedure: THORACOSCOPIC BLEBECTOMY;  Surgeon: Damaso Westbrook MD;  [facility]     THORACOSCOPIC PLEURODESIS Left 11/13/2014    Procedure: THORACOSCOPIC PLEURODESIS;  Surgeon: Damaso Westbrook MD;  [facility]     VENTRICULOSCOPY Right 3/25/2015    Procedure: VENTRICULOSCOPY;  Surgeon: Vasile Boyd MD;  [facility]       Family History   Problem Relation Age of Onset     Family History Negative " "Other        Review of Systems   Constitutional: Negative.    HENT: Positive for dental problem.    Eyes: Negative.    Respiratory: Negative.    Cardiovascular: Negative.    Gastrointestinal: Negative.    Endocrine: Negative.    Genitourinary: Negative.    Musculoskeletal: Negative.    Skin: Negative.    Allergic/Immunologic: Negative.    Neurological: Negative.    Hematological: Negative.    Psychiatric/Behavioral: Negative.        /75 (BP Location: Left arm, Patient Position: Fowlers, Cuff Size: Adult Large)   Pulse 66   Resp 20   Ht 1.722 m (5' 7.8\")   Wt 61.8 kg (136 lb 3.9 oz)   SpO2 99%   BMI 20.84 kg/m       Physical Exam  Constitutional:       Appearance: Normal appearance.   HENT:      Head: Normocephalic and atraumatic.      Right Ear: Tympanic membrane normal.      Left Ear: Tympanic membrane normal.      Nose: Nose normal.      Mouth/Throat:      Mouth: Mucous membranes are moist.      Pharynx: Oropharynx is clear.      Comments: 6 upper crowns.   Eyes:      Extraocular Movements: Extraocular movements intact.      Conjunctiva/sclera: Conjunctivae normal.      Pupils: Pupils are equal, round, and reactive to light.   Neck:      Musculoskeletal: Normal range of motion and neck supple.   Cardiovascular:      Rate and Rhythm: Normal rate and regular rhythm.      Pulses: Normal pulses.      Heart sounds: Normal heart sounds.   Pulmonary:      Effort: Pulmonary effort is normal.      Breath sounds: Normal breath sounds.   Abdominal:      General: Abdomen is flat. Bowel sounds are normal.      Palpations: Abdomen is soft.   Musculoskeletal: Normal range of motion.   Skin:     General: Skin is warm and dry.      Capillary Refill: Capillary refill takes less than 2 seconds.   Neurological:      General: No focal deficit present.      Mental Status: He is alert and oriented to person, place, and time.   Psychiatric:         Mood and Affect: Mood normal.         Behavior: Behavior normal. "         Results for orders placed or performed during the hospital encounter of 02/11/20   MRI Brain w/o contrast     Status: None    Narrative     MR BRAIN W/O CONTRAST 2/11/2020 11:15 AM    Provided History: history of pineal germ cell tumor; Pineal germ cell  tumor (H).  ICD-10: Pineal germ cell tumor (H)    Comparison: CT head 12/31/2019, MR dated 9/13/2017, MR dated  3/11/2015.    Technique: Limited MRI of the brain with sagittal T1-weighted images,  axial T2 FLAIR and T2 fat-saturated images, and axial susceptibility  and diffusion-weighted images.    Findings:  Postsurgical changes right frontal craniotomy and pineal gland tumor  resection. Prior ventriculostomy tract visible right frontal lobe. No  new intracranial mass, midline shift or evidence of intracranial  hemorrhage. The ventricles are proportionate to the cerebral sulci. No  abnormal restriction on diffusion-weighted imaging. Major  intrathoracic vascular flow voids are patent.    No suspicious osseous lesion. The paranasal sinuses are clear. Small  fluid accumulations in the mastoid air cells on the left. The orbits  are grossly unremarkable.      Impression    Impression:  Postsurgical changes of prior pineal gland tumor resection. No  evidence of recurrent germ cell tumor on these noncontrast images. No  overt change from the prior examinations.    I have personally reviewed the examination and initial interpretation  and I agree with the findings.    RANI MAGALLANES MD       Impression:  1.   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 Bone density and dental loss 2/2 chemotherapy and radiation       Plan:  1. RTC in 1 for follow up, or sooner PRN.  2. Long term f/u clinic  3. Consider neuropsych testing        Zev Ortiz  Pediatric Resident, PGY-2  [facility]       Time spent with patient 45 minutes.   Time Spent on this Encounter   I spent 45  minutes on the unit/floor managing the care of Rene Romeo. Over 50% of my time was spent on the following:   - Counseling the patient and/or family regarding: diagnostic results and recommended follow-up  - Coordination of care with the: care coordinator/        Luis Alberto Melo MD      This document serves as a record of the services and decisions personally performed and made by Luis Alberto Melo MD. It was created on his behalf by Ezequiel Lazo a trained medical scribe. The creation of this document is based on the provider's statements to the medical scribe.    The documentation recorded by the scribe accurately reflects the services I personally performed and the decisions made by me.    Luis Alberto Melo    CC  Patient Care Team:  No Ref-Primary, Physician as PCP - Erica Andrea, RN as Nurse Coordinator (Neurosurgery)  Rita Krause PA-C as PCP (Family Practice)  Luis Alberto Melo MD as Referring Physician (Pediatric Hematology/Oncology)  Luis Alberto Melo MD as Referring Physician  Pamella Hale MD as MD (Dermatology)  Schwab, Briana, RN as Nurse Coordinator  Dilcia Ziegler, PhD LP (Psychology)  Lexie Calzada APRN CNP as Nurse Practitioner (Nurse Practitioner - Pediatrics)  Eugene Flores MD as Assigned PCP  RITA KRAUSE    Copy to patient  RENE ROMEO  [address] [facility] [id number]      Luis Alberto Melo MD

## 2020-02-11 NOTE — PROGRESS NOTES
Pediatric Hematology/Oncology Clinic Note     HPI-  Steven Romeo is a 24 year old male with a history of pineal germ cell tumor who presents to the clinic with his mother and siblings for a follow up, lab, and MRI results.    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 States that he is doing well at his job. Denies taking any new medications. No N/V, loss of appetite, unexplanied weigth change, unexplained fevers, cough, chest pain, abdominal pain, constipation, diarrhea, urinary symptoms, or rash.      History of blood vessel burst on his left eye on New Years. Was unsure of the exact cause but attributed the cause towards eye strain during snow shoveling. Denies trauma to the eye or eye infection. Had an eye scan at their local ED and the results was normal. No follow up with the ophthalmologist was scheduled. Symptoms resolved within few days and did not returned. It was the first time it had happened. Denies blurry vision, dizziness, or headaches. His brother has a picture on his cell phone and it seems to be subconjunctival hemorrhage.      He had a recent dental work done on December 2019. He was given IV sedation due his anxiety (PTSD) during the dental work. Currently has 6 crowns placed. Was planning on doing a root canal however states that his dental scan showed bone loss and the dentist did not feel comfortable proceeding with the procedure until seen by heme/onc provider. Mom is wondering if his dental problems related to his chemotherapy and radiotherapy.     His mood is normal. Was slightly crabby due to waiting but otherwise normal. Denies seeing mental health provider.     Steven and Steven's mom also wished to discuss about Steven's insurance status as he turns 26 years old.     Review of scan is shown to be normal. Negative for tumors. No evidence of recurring tumor and no evidence hydrocephalus.     Fam/Soc: There are obvious  "current family stressors. Mom remarks that she \"does not talk about [place] anymore.\"    History was obtained from patient and patient's parent.       Allergies   Allergen Reactions     Gadolinium Itching     Redness/Itching immediately with Gadolinium injection.  Patient reacts even with premedication.  [facility] 4-1--2016     Neupogen [Filgrastim]        Current Outpatient Medications   Medication     ACETAMINOPHEN PO     Ascorbic Acid (VITAMIN C PO)     diphenhydrAMINE (BENADRYL) 25 MG tablet     Bbkjyllpa-RWN-GN-APAP (YANET-SELTZER PLUS COLD & FLU PO)     guaiFENesin (MUCINEX) 600 MG 12 hr tablet     ketorolac (TORADOL) 10 MG tablet     No current facility-administered medications for this visit.        Past Medical History:   Diagnosis Date     Cancer (H)     germinoma brain cancer     Hydrocephalus (H) 2015    See MRI/CT     Infectious mononucleosis     age 7     Other chronic pain     headaches     Pineal tumor 2015    see MRI/CT     Pneumonia 9/13/2011     Pneumothorax on left 11/2014    spontaneous     Spontaneous pneumothorax 11/9/2014       Past Surgical History:   Procedure Laterality Date     INSERT CHEST TUBE Left 11/10/2014    Procedure: INSERT CHEST TUBE;  Surgeon: Damaso Westbrook MD;  Location: [facility] OR     REMOVE PORT VASCULAR ACCESS Right 10/12/2015    Procedure: REMOVE PORT VASCULAR ACCESS;  Surgeon: Renato Arroyo MD;  Location: [facility]      THORACOSCOPIC BLEBECTOMY Left 11/13/2014    Procedure: THORACOSCOPIC BLEBECTOMY;  Surgeon: Damaso Westbrook MD;  Location: [facility]     THORACOSCOPIC PLEURODESIS Left 11/13/2014    Procedure: THORACOSCOPIC PLEURODESIS;  Surgeon: Damaso Westbrook MD;  Location: [facility]     VENTRICULOSCOPY Right 3/25/2015    Procedure: VENTRICULOSCOPY;  Surgeon: Vasile Boyd MD;  Location: [facility]       Family History   Problem Relation Age of Onset     Family History Negative Other        Review of Systems   Constitutional: Negative.    HENT: Positive for " "dental problem.    Eyes: Negative.    Respiratory: Negative.    Cardiovascular: Negative.    Gastrointestinal: Negative.    Endocrine: Negative.    Genitourinary: Negative.    Musculoskeletal: Negative.    Skin: Negative.    Allergic/Immunologic: Negative.    Neurological: Negative.    Hematological: Negative.    Psychiatric/Behavioral: Negative.        /75 (BP Location: Left arm, Patient Position: Fowlers, Cuff Size: Adult Large)   Pulse 66   Resp 20   Ht 1.722 m (5' 7.8\")   Wt 61.8 kg (136 lb 3.9 oz)   SpO2 99%   BMI 20.84 kg/m      Physical Exam  Constitutional:       Appearance: Normal appearance.   HENT:      Head: Normocephalic and atraumatic.      Right Ear: Tympanic membrane normal.      Left Ear: Tympanic membrane normal.      Nose: Nose normal.      Mouth/Throat:      Mouth: Mucous membranes are moist.      Pharynx: Oropharynx is clear.      Comments: 6 upper crowns.   Eyes:      Extraocular Movements: Extraocular movements intact.      Conjunctiva/sclera: Conjunctivae normal.      Pupils: Pupils are equal, round, and reactive to light.   Neck:      Musculoskeletal: Normal range of motion and neck supple.   Cardiovascular:      Rate and Rhythm: Normal rate and regular rhythm.      Pulses: Normal pulses.      Heart sounds: Normal heart sounds.   Pulmonary:      Effort: Pulmonary effort is normal.      Breath sounds: Normal breath sounds.   Abdominal:      General: Abdomen is flat. Bowel sounds are normal.      Palpations: Abdomen is soft.   Musculoskeletal: Normal range of motion.   Skin:     General: Skin is warm and dry.      Capillary Refill: Capillary refill takes less than 2 seconds.   Neurological:      General: No focal deficit present.      Mental Status: He is alert and oriented to person, place, and time.   Psychiatric:         Mood and Affect: Mood normal.         Behavior: Behavior normal.         Results for orders placed or performed during the hospital encounter of 02/11/20   MRI " Brain w/o contrast     Status: None    Narrative     MR BRAIN W/O CONTRAST 2/11/2020 11:15 AM    Provided History: history of pineal germ cell tumor; Pineal germ cell  tumor (H).  ICD-10: Pineal germ cell tumor (H)    Comparison: CT head 12/31/2019, MR dated 9/13/2017, MR dated  3/11/2015.    Technique: Limited MRI of the brain with sagittal T1-weighted images,  axial T2 FLAIR and T2 fat-saturated images, and axial susceptibility  and diffusion-weighted images.    Findings:  Postsurgical changes right frontal craniotomy and pineal gland tumor  resection. Prior ventriculostomy tract visible right frontal lobe. No  new intracranial mass, midline shift or evidence of intracranial  hemorrhage. The ventricles are proportionate to the cerebral sulci. No  abnormal restriction on diffusion-weighted imaging. Major  intrathoracic vascular flow voids are patent.    No suspicious osseous lesion. The paranasal sinuses are clear. Small  fluid accumulations in the mastoid air cells on the left. The orbits  are grossly unremarkable.      Impression    Impression:  Postsurgical changes of prior pineal gland tumor resection. No  evidence of recurrent germ cell tumor on these noncontrast images. No  overt change from the prior examinations.    I have personally reviewed the examination and initial interpretation  and I agree with the findings.    RANI MAGALLANES MD       Impression:  1.  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 Bone density and dental loss 2/2 chemotherapy and radiation       Plan:  1. RTC in 1 for follow up, or sooner PRN.  2. Long term f/u clinic  3. Consider neuropsych testing        Zev Ortiz  Pediatric Resident, PGY-2  [facility]       Time spent with patient 45 minutes.   Time Spent on this Encounter   I spent 45 minutes on the unit/floor managing the care of Steven Romeo. Over 50% of my time was spent on the  following:   - Counseling the patient and/or family regarding: diagnostic results and recommended follow-up  - Coordination of care with the: care coordinator/        Luis Alberto Melo MD      This document serves as a record of the services and decisions personally performed and made by Luis Alberto Melo MD. It was created on his behalf by Ezequeil Lazo a trained medical scribe. The creation of this document is based on the provider's statements to the medical scribe.    The documentation recorded by the scribe accurately reflects the services I personally performed and the decisions made by me.    Luis Alberto Melo    CC  Patient Care Team:  No Ref-Primary, Physician as PCP - General  Erica Luna RN as Nurse Coordinator (Neurosurgery)  Rita Krause PAKathleenC as PCP (Family Practice)  Luis Alberto Melo MD as Referring Physician (Pediatric Hematology/Oncology)  Luis Alberto Melo MD as Referring Physician  Pamella Hale MD as MD (Dermatology)  Schwab, Briana, RN as Nurse Coordinator  Dilcia Ziegler, PhD LP (Psychology)  Lexie Calzada APRN CNP as Nurse Practitioner (Nurse Practitioner - Pediatrics)  Eugene Flores MD as Assigned PCP  RITA KRAUSE    Copy to patient  RENE SALAS  [address] [place] [address]

## 2020-02-11 NOTE — NURSING NOTE
"Chief Complaint   Patient presents with     RECHECK     Patient is here today for Pineal tumor follow up     /75 (BP Location: Left arm, Patient Position: Fowlers, Cuff Size: Adult Large)   Pulse 66   Resp 20   Ht 1.722 m (5' 7.8\")   Wt 61.8 kg (136 lb 3.9 oz)   SpO2 99%   BMI 20.84 kg/m      Alba Bernabe LPN LPN    February 11, 2020    "

## 2020-02-11 NOTE — LETTER
P PEDS CNS TUMOR/NEUROFIBROMATOSIS      [facility]  9th Floor  [address] [place] [address]  Phone: [phone]       DATE & TIME ISSUED: 2020   PATIENT NAME: Steven Romeo   : 1995  DX: Pineal Germ Cell Tumor      Steven Romeo was diagnosed with a Pineal Germ cell Tumor in . He was treated with surgery followed with cranial radiation (3060cGy) and 4 cycles of chemotherapy that included Etoposide and Cisplatin.   Steven now presents with significant dental issues that require major restoration.  Chemotherapy can affect tooth enamel and increase the risk of long-term dental problems. Additionally high dose radiation to the head and neck can damage tooth development. We believe the dental issues Steven now has are directly related to his treatment for his brain tumor. We request medical insurance be considered for coverage of his restorations as they are a direct results of his medical treatment he underwent for his brain tumor.    Thank you for your consideration in this matter.     Sincerely,             Luis Alberto Melo MD   Medical Director, Pediatric Neuro-oncology and Neurofibromatosis programs   Co-medical director, [facility] Pediatric Hematology Oncology

## 2020-02-24 DIAGNOSIS — D44.5 PINEAL GERM CELL TUMOR (H): Primary | Chronic | ICD-10-CM

## 2020-05-19 ENCOUNTER — TELEPHONE (OUTPATIENT)
Dept: PEDIATRIC HEMATOLOGY/ONCOLOGY | Facility: CLINIC | Age: 25
End: 2020-05-19

## 2020-05-19 NOTE — TELEPHONE ENCOUNTER
ROSELYN received call from Steven's mother, Presley noting that Steven is having multiple dental issues that are related to his radiation/chemotherapy treatment. She explained that they have a letter from Dr. Melo noting that his bone loss is related to his treatment which was submitted to insurance. Dental office completed work for Steven and Presley received a large bill noting that insurance denied coverage. ROSELYN encouraged Presley to complete appeal paperwork with the dental office. We discussed applying for MA for Steven as he will no longer be on parents insurance when he turns 26. She expressed feeling overwhelmed by all of the paperwork. ROSELYN listened, validated her concerns and encouraged her to follow-up with dental  staff to request help with the appeal. Presley denied any other needs/concerns at time of her phone call to ROSELYN. Social work will continue to assess needs and provide ongoing psychosocial support and access to resources.      LETA Padron, LICSW, OSW-C  Clinical    Pediatric Hematology Oncology   [facility]   Monday-Thursday   Phone: [phone]  Pager: [phone]    NO LETTER

## 2020-06-01 ENCOUNTER — HOSPITAL ENCOUNTER (EMERGENCY)
Facility: CLINIC | Age: 25
Discharge: HOME | End: 2020-06-01
Attending: PHYSICIAN ASSISTANT | Admitting: PHYSICIAN ASSISTANT
Payer: COMMERCIAL

## 2020-06-01 VITALS
HEART RATE: 70 BPM | OXYGEN SATURATION: 97 % | WEIGHT: 140 LBS | DIASTOLIC BLOOD PRESSURE: 85 MMHG | RESPIRATION RATE: 18 BRPM | BODY MASS INDEX: 21.42 KG/M2 | SYSTOLIC BLOOD PRESSURE: 127 MMHG | TEMPERATURE: 98.7 F

## 2020-06-01 DIAGNOSIS — J06.9 VIRAL URI WITH COUGH: ICD-10-CM

## 2020-06-01 LAB
DEPRECATED S PYO AG THROAT QL EIA: NEGATIVE
SPECIMEN SOURCE: NORMAL

## 2020-06-01 PROCEDURE — 40001204 ZZHCL STATISTIC STREP A RAPID: Performed by: PHYSICIAN ASSISTANT

## 2020-06-01 PROCEDURE — C9803 HOPD COVID-19 SPEC COLLECT: HCPCS | Performed by: PHYSICIAN ASSISTANT

## 2020-06-01 PROCEDURE — 87651 STREP A DNA AMP PROBE: CPT | Performed by: PHYSICIAN ASSISTANT

## 2020-06-01 PROCEDURE — 99283 EMERGENCY DEPT VISIT LOW MDM: CPT | Performed by: PHYSICIAN ASSISTANT

## 2020-06-01 PROCEDURE — 99284 EMERGENCY DEPT VISIT MOD MDM: CPT | Mod: Z6 | Performed by: PHYSICIAN ASSISTANT

## 2020-06-01 NOTE — LETTER
June 1, 2020      To Whom It May Concern:      Steven Romeo was seen in our Emergency Department today, 06/01/20.  I expect his condition to improve over the next number of days.  He cannot work at this time due to his illness.  He has been tested for COVID-19, and cannot return to work until his tests have returned.  Typically, this test takes 48-72 hours to return.  Please excuse him for the time that he has missed and will miss due to this illness.        Sincerely,            Myke Acuña PA-C

## 2020-06-01 NOTE — ED AVS SNAPSHOT
[facility] Emergency Department  [address] [place] [address]  Phone:  [phone]  Fax:  [phone]                                    Steven Romeo   MRN: [id number]    Department:  [facility] Emergency Department   Date of Visit:  6/1/2020           After Visit Summary Signature Page    I have received my discharge instructions, and my questions have been answered. I have discussed any challenges I see with this plan with the nurse or doctor.    ..........................................................................................................................................  Patient/Patient Representative Signature      ..........................................................................................................................................  Patient Representative Print Name and Relationship to Patient    ..................................................               ................................................  Date                                   Time    ..........................................................................................................................................  Reviewed by Signature/Title    ...................................................              ..............................................  Date                                               Time          22EPIC Rev 08/18

## 2020-06-02 LAB
SARS-COV-2 PCR COMMENT: NORMAL
SARS-COV-2 RNA SPEC QL NAA+PROBE: NEGATIVE
SARS-COV-2 RNA SPEC QL NAA+PROBE: NORMAL
SPECIMEN SOURCE: NORMAL
STREP GROUP A PCR: NOT DETECTED

## 2020-06-02 NOTE — ED TRIAGE NOTES
Patient with sore throat starting yesterday, today with headache and fever. Tylenol last 1 hour ago.

## 2020-06-02 NOTE — DISCHARGE INSTRUCTIONS
It was a pleasure working with you today!  I hope your condition improves rapidly!     Your strep test was negative.  Your symptoms and exam are consistent with a viral cause for your illness.  Unfortunately, antibiotics will not help this type of condition.  Good old-fashioned rest, pushing clear fluids, and eating a good diet is the most appropriate to help your body enriquez this.  It is okay to use ibuprofen or Tylenol to help with your throat discomfort.  Using an antihistamine can help dry up excessive nasal secretions.  Given the situation with COVID-19 in society, we did test you for this.  Your symptoms are not consistent with typical COVID-19 illness, but we do need to be careful in case you have it.  Your test will come back in the next 48 to 72 hours typically.  Please quarantine yourself and do not go to work until you receive the COVID-19 test results from the hospital.  I summarized some COVID-19 information below for your information if you are interested.          Your symptoms show that you may have coronavirus (COVID-19). This illness can cause fever, cough and trouble breathing. Many people get a mild case and get better on their own. Some people can get very sick.     Not all patients are tested for COVID-19. If you need to be tested, your care team will let you know.     How can I protect others?    Without a test, we can't know for sure that you have COVID-19. For safety, it's very important to follow these rules.    Stay home and away from others (self-isolate) until:  At least 10 days have passed since your symptoms started. And   You've had no fever--and no medicine that reduces fever--for 3 full days (72 hours). And    Your other symptoms have resolved (gotten better).     During this time:  Stay in your own room (and use your own bathroom), if you can.  Stay away from others in your home. No hugging, kissing or shaking hands.  No visitors.  Don't go to work, school or anywhere else.   Clean   high touch  surfaces often (doorknobs, counters, handles, etc.). Use a household cleaning spray or wipes.  Cover your mouth and nose with a mask, tissue or wash cloth to avoid spreading germs.  Wash your hands and face often. Use soap and water.  For more tips, go to https://www.cdc.gov/coronavirus/2019-ncov/downloads/10Things.pdf.    How can I take care of myself?    Get lots of rest. Drink extra fluids (unless a doctor has told you not to).     Take Tylenol (acetaminophen) for fever or pain. If you have liver or kidney problems, ask your family doctor if it's okay to take Tylenol.     Adults can take either:   650 mg (two 325 mg pills) every 4 to 6 hours, or   1,000 mg (two 500 mg pills) every 8 hours as needed.   Note: Don't take more than 3,000 mg in one day.   Acetaminophen is found in many medicines (both prescribed and over-the-counter medicines). Read all labels to be sure you don't take too much.   For children, check the Tylenol bottle for the right dose. The dose is based on the child's age or weight.    If you have other health problems (like cancer, heart failure, an organ transplant or severe kidney disease): Call your specialty clinic if you don't feel better in the next 2 days.    Know when to call 911: If your breathing is so bad that it keeps you from doing normal activities, call 911 or go to the emergency room. Tell them that you've been staying home and may have COVID-19.      What are the symptoms of COVID-19?   The most common symptoms are cough, fever and trouble breathing.   Less common symptoms include body aches, chills, diarrhea (loose, watery poops), fatigue (feeling very tired), headache, runny nose, sore throat and loss of smell.   COVID-19 can cause severe coughing (bronchitis) and lung infection (pneumonia).    How does it spread?   The virus may spread when a person coughs or sneezes into the air. The virus can travel about 6 feet this way, and it can live on surfaces.    Common   (household disinfectants) will kill the virus.    Who is at risk?  Anyone can catch COVID-19 if they're around someone who has the virus.    How can others protect themselves?   Stay away from people who have COVID-19 (or symptoms of COVID-19).  Wash hands often with soap and water. Or, use hand  with at least 60% alcohol.  Avoid touching the eyes, nose or mouth.   Wear a face mask when you go out in public, when sick or when caring for a sick person.      For more about COVID-19 and caring for yourself at home, please visit the CDC website at https://www.cdc.gov/coronavirus/2019-ncov/about/steps-when-sick.html.     To learn about care at [facility], go to [email or web address].    Below are the COVID-19 hotlines at the [place] Department of [facility] ([facility]). Interpreters are available.   For health questions: Call 564-816-7308 or 1-728.939.9676 (7 a.m. to 7 p.m.)  For questions about schools and childcare: Call 913-152-9325 or 1-251.909.8428 (7 a.m. to 7 p.m.)

## 2020-06-02 NOTE — ED PROVIDER NOTES
History     Chief Complaint   Patient presents with     Pharyngitis     Headache     HPI  Steven Romeo is a 24 year old male who presents for evaluation of URI symptoms starting yesterday.  Initially noted a sore throat and painful swallowing.  Later developed profuse rhinorrhea, congestion, generalized headache, and a cough occasionally productive of a clear phlegm.  He denies chest pain or dyspnea.  No lower extremity edema or calf pain.  Fever up to 100.5 at home per his report.  Took Tylenol at home to treat symptoms.  No recent travel history.  No abnormal exposures.  No positive COVID-19 exposures that he knows of.        Allergies:  Allergies   Allergen Reactions     Gadolinium Itching     Redness/Itching immediately with Gadolinium injection.  Patient reacts even with premedication.  [facility] 4-1--2016     Neupogen [Filgrastim]        Problem List:    Patient Active Problem List    Diagnosis Date Noted     Nightmares 04/09/2018     Priority: Medium     FREDA (generalized anxiety disorder) 04/09/2018     Priority: Medium     Panic attacks 04/09/2018     Priority: Medium     PTSD (post-traumatic stress disorder) 04/09/2018     Priority: Medium     Callus of foot-right 04/05/2018     Priority: Medium     Status post chemotherapy 07/01/2015     Priority: Medium     Health Care Home 06/30/2015     Priority: Medium     Status:  Declined  Care Coordinator:  Elvie Walsh    See Letters for [facility] Care Plan  Date:  June 30, 2015         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     Priority: Medium     Hydrocephalus (H)      Priority: Medium     S/p third ventriculostomy 3/25/15       Tobacco abuse 11/09/2014     Priority: Medium     Moderate recurrent major depression (H) 12/03/2013     Priority: Medium        Past Medical History:    Past Medical History:   Diagnosis Date     Cancer (H)      Hydrocephalus (H) 2015     Infectious mononucleosis      Other chronic pain      Pineal tumor 2015     Pneumonia 9/13/2011      Pneumothorax on left 2014     Spontaneous pneumothorax 2014       Past Surgical History:    Past Surgical History:   Procedure Laterality Date     INSERT CHEST TUBE Left 11/10/2014    Procedure: INSERT CHEST TUBE;  Surgeon: Damaso Westbrook MD;  [facility]     REMOVE PORT VASCULAR ACCESS Right 10/12/2015    Procedure: REMOVE PORT VASCULAR ACCESS;  Surgeon: Renato Arroyo MD;  [facility]      THORACOSCOPIC BLEBECTOMY Left 2014    Procedure: THORACOSCOPIC BLEBECTOMY;  Surgeon: Damaso Westbrook MD;  [facility]     THORACOSCOPIC PLEURODESIS Left 2014    Procedure: THORACOSCOPIC PLEURODESIS;  Surgeon: Damaso Westbrook MD;  [facility]     VENTRICULOSCOPY Right 3/25/2015    Procedure: VENTRICULOSCOPY;  Surgeon: Vasile Boyd MD;  [facility]       Family History:    Family History   Problem Relation Age of Onset     Family History Negative Other        Social History:  Marital Status:  Single [1]  Social History     Tobacco Use     Smoking status: Current Every Day Smoker     Packs/day: 0.25     Years: 1.00     Pack years: 0.25     Types: Cigarettes     Last attempt to quit: 2014     Years since quittin.4     Smokeless tobacco: Never Used     Tobacco comment: e cig   Substance Use Topics     Alcohol use: Yes     Alcohol/week: 0.0 standard drinks     Comment: occasionally     Drug use: Yes     Types: Methamphetamines        Medications:    ACETAMINOPHEN PO  Ascorbic Acid (VITAMIN C PO)  diphenhydrAMINE (BENADRYL) 25 MG tablet  guaiFENesin (MUCINEX) 600 MG 12 hr tablet  ketorolac (TORADOL) 10 MG tablet  Kfubuqgge-IUZ-OJ-APAP (YANET-SELTZER PLUS COLD & FLU PO)          Review of Systems   All other systems reviewed and are negative.      Physical Exam   BP: (!) 141/86  Pulse: 84  Heart Rate: 84  Temp: 99.5  F (37.5  C)  Resp: 18  Weight: 63.5 kg (140 lb)  SpO2: 98 %      Physical Exam  Vitals signs and nursing note reviewed.    Constitutional:       General: He is not in acute distress.     Appearance: He is not diaphoretic.   HENT:      Head: Normocephalic and atraumatic.      Right Ear: Tympanic membrane, ear canal and external ear normal. No drainage, swelling or tenderness.      Left Ear: Tympanic membrane, ear canal and external ear normal. No drainage, swelling or tenderness.      Nose: Nose normal.      Mouth/Throat:      Mouth: Mucous membranes are moist.      Dentition: Normal dentition. No dental caries.      Tongue: No lesions.      Palate: No mass and lesions.      Pharynx: Oropharynx is clear. Posterior oropharyngeal erythema present. No pharyngeal swelling, oropharyngeal exudate or uvula swelling.      Tonsils: No tonsillar exudate or tonsillar abscesses. 1+ on the right. 1+ on the left.   Eyes:      General: No scleral icterus.        Right eye: No discharge.         Left eye: No discharge.      Conjunctiva/sclera: Conjunctivae normal.      Pupils: Pupils are equal, round, and reactive to light.   Neck:      Musculoskeletal: Normal range of motion and neck supple.      Thyroid: No thyromegaly.   Cardiovascular:      Rate and Rhythm: Normal rate and regular rhythm.      Heart sounds: Normal heart sounds. No murmur.   Pulmonary:      Effort: Pulmonary effort is normal. No respiratory distress.      Breath sounds: Normal breath sounds. No wheezing or rales.   Chest:      Chest wall: No tenderness.   Abdominal:      General: Bowel sounds are normal. There is no distension.      Palpations: Abdomen is soft. There is no mass.      Tenderness: There is no abdominal tenderness. There is no guarding or rebound.   Musculoskeletal: Normal range of motion.         General: No tenderness or deformity.   Lymphadenopathy:      Cervical: No cervical adenopathy.   Skin:     General: Skin is warm and dry.      Capillary Refill: Capillary refill takes less than 2 seconds.      Findings: No erythema or rash.   Neurological:      Mental  Status: He is alert and oriented to person, place, and time.      Cranial Nerves: No cranial nerve deficit.   Psychiatric:         Behavior: Behavior normal.         Thought Content: Thought content normal.         ED Course        Procedures               Critical Care time:  none               Results for orders placed or performed during the hospital encounter of 06/01/20 (from the past 24 hour(s))   Streptococcus A Rapid Scr w Reflx to PCR    Specimen: Throat   Result Value Ref Range    Strep Specimen Description Throat     Streptococcus Group A Rapid Screen Negative NEG^Negative       Medications - No data to display    Assessments & Plan (with Medical Decision Making)  Viral URI with cough     24 year old male presents for evaluation of URI symptoms starting yesterday including sore throat, painful swallowing, rhinorrhea, congestion, generalized headache, fever up to 100.5, and a cough occasionally productive of clear phlegm.  No dyspnea or chest pain.  No abnormal exposures or travel history.  On exam blood pressure 141/86, temperature 99.5, pulse 84, respiration 18, oxygen saturation 98% on room air.  Patient appears well.  Oropharynx with no significant tonsillar hypertrophy, asymmetry, or exudate.  There is some mild erythema.  Remainder the exam is otherwise normal.  Lung fields are clear.  Rapid strep test negative.  COVID-19 swab pending.  Discussed with the patient that his symptoms are consistent with viral etiology.  Antibiotics not indicated.  I encouraged him to quarantine himself until his COVID-19 swab results return.  Patient information regarding COVID-19 discussed with him.  Although, this does not appear to be more consistent with a typical viral URI.  Symptomatic care measures reviewed with him including rest, cold/hot fluids, ibuprofen/Tylenol as needed for discomfort, and antihistamine therapy to help with the rhinorrhea.  Strict ED return instructions reviewed with the patient.  Note  provided for work.  He was in agreement.     I have reviewed the nursing notes.    I have reviewed the findings, diagnosis, plan and need for follow up with the patient.       New Prescriptions    No medications on file       Final diagnoses:   Viral URI with cough       Disclaimer: This note consists of symbols derived from keyboarding, dictation and/or voice recognition software. As a result, there may be errors in the script that have gone undetected. Please consider this when interpreting information found in this chart.      6/1/2020   Myke Acuña PA-C   [facility] EMERGENCY DEPARTMENT     Myke Acuña PA-C  06/01/20 2028

## 2020-11-22 ENCOUNTER — HEALTH MAINTENANCE LETTER (OUTPATIENT)
Age: 25
End: 2020-11-22

## 2021-02-18 ENCOUNTER — HOSPITAL ENCOUNTER (OUTPATIENT)
Dept: MRI IMAGING | Facility: CLINIC | Age: 26
Discharge: HOME | End: 2021-02-18
Attending: PEDIATRICS | Admitting: PEDIATRICS
Payer: COMMERCIAL

## 2021-02-18 DIAGNOSIS — D44.5 PINEAL GERM CELL TUMOR (H): Chronic | ICD-10-CM

## 2021-02-18 PROCEDURE — 70551 MRI BRAIN STEM W/O DYE: CPT

## 2021-02-19 ENCOUNTER — VIRTUAL VISIT (OUTPATIENT)
Dept: PEDIATRIC HEMATOLOGY/ONCOLOGY | Facility: CLINIC | Age: 26
End: 2021-02-19
Attending: NURSE PRACTITIONER
Payer: COMMERCIAL

## 2021-02-19 DIAGNOSIS — D44.5 PINEAL GERM CELL TUMOR (H): Primary | ICD-10-CM

## 2021-02-19 PROCEDURE — 99215 OFFICE O/P EST HI 40 MIN: CPT | Mod: GT | Performed by: NURSE PRACTITIONER

## 2021-02-19 ASSESSMENT — ENCOUNTER SYMPTOMS
PSYCHIATRIC NEGATIVE: 1
NEUROLOGICAL NEGATIVE: 1
CARDIOVASCULAR NEGATIVE: 1
EYES NEGATIVE: 1
ALLERGIC/IMMUNOLOGIC NEGATIVE: 1
HEMATOLOGIC/LYMPHATIC NEGATIVE: 1
CONSTITUTIONAL NEGATIVE: 1
MUSCULOSKELETAL NEGATIVE: 1
ENDOCRINE NEGATIVE: 1
RESPIRATORY NEGATIVE: 1
GASTROINTESTINAL NEGATIVE: 1

## 2021-02-19 NOTE — PROGRESS NOTES
" The patient has been notified of following:      \"This video visit will be conducted via a call between you and your physician/provider. We have found that certain health care needs can be provided without the need for an in-person physical exam.  This service lets us provide the care you need with a video conversation.  If a prescription is necessary we can send it directly to your pharmacy.  If lab work is needed we can place an order for that and you can then stop by our lab to have the test done at a later time.     Video visits are billed at different rates depending on your insurance coverage.  Please reach out to your insurance provider with any questions.     If during the course of the call the physician/provider feels a video visit is not appropriate, you will not be charged for this service.\"     Patient has given verbal consent for Video visit? Yes     Video-Visit Details     Type of service:  Video Visit     Video Start Time: 2:53 PM  Video End Time: 3:08 PM    Originating Location (pt. Location): Home     Distant Location (provider location):  [facility]      Platform used for Video Visit: [employer]       Pediatric Hematology/Oncology Video Note     CC:  Steven Romeo is a 25 year old male with a history of pineal germ cell tumor who presents  for a follow up and to review MRI results.      HPI:   He has been fairly healthy. He had a cold over the winter but no other major illness.  Denies taking any new medications. No N/V, loss of appetite, unexplained weight change, unexplained fevers, cough, chest pain, abdominal pain, constipation, diarrhea, urinary symptoms, or rash.      He has had a lot of dental work done beginning in December 2019.  Currently has 6 crowns placed.        His mood has been good.  He is dealing pretty well dealing with the isolation of COVID.  He has a lot to keep him busy. .       Fam/Soc:  He is living in [place].  He is not currently working.  Steven is going to " school for electrical engineering.  He is going to [employer].  He is attending school full time. He was able to get some government grants as long as he attends full-time.  He made the honor roll.    Imaging:  FINDINGS: Postoperative change consisting of a small right frontal bone destini hole and surgical tract leading through the anterolateral  aspect of the right frontal lobe for resection of the pineal germ cell tumor again noted. No evidence for residual or recurrent tumor on  these non-contrast-enhanced images. The ventricles and basal cisterns are within normal limits in configuration. There is no midline shift. There are no extra-axial fluid collections. There is no evidence for  acute stroke or for acute intracranial hemorrhage. Gray-white differentiation is well maintained.     There is no sinusitis or mastoiditis.                                                                      IMPRESSION: Normal brain MRI. Postoperative changes of the right  frontal skull and right frontal lobe again noted. No evidence for residual or recurrent pineal region tumor on these non-contrast-enhanced images.      History was obtained from patient.       Allergies   Allergen Reactions     Gadolinium Itching     Redness/Itching immediately with Gadolinium injection.  Patient reacts even with premedication.  [facility] 4-1--2016     Neupogen [Filgrastim]        Current Outpatient Medications   Medication     ACETAMINOPHEN PO     Ascorbic Acid (VITAMIN C PO)     diphenhydrAMINE (BENADRYL) 25 MG tablet     guaiFENesin (MUCINEX) 600 MG 12 hr tablet     ketorolac (TORADOL) 10 MG tablet     Zbssmnjgi-DNX-DJ-APAP (YANET-SELTZER PLUS COLD & FLU PO)     No current facility-administered medications for this visit.        Past Medical History:   Diagnosis Date     Cancer (H)     germinoma brain cancer     Hydrocephalus (H) 2015    See MRI/CT     Infectious mononucleosis     age 7     Other chronic pain     headaches     Pineal tumor 2015     see MRI/CT     Pneumonia 9/13/2011     Pneumothorax on left 11/2014    spontaneous     Spontaneous pneumothorax 11/9/2014       Past Surgical History:   Procedure Laterality Date     INSERT CHEST TUBE Left 11/10/2014    Procedure: INSERT CHEST TUBE;  Surgeon: Damaso Westbrook MD;  [facility]     REMOVE PORT VASCULAR ACCESS Right 10/12/2015    Procedure: REMOVE PORT VASCULAR ACCESS;  Surgeon: Renato Arroyo MD;  [facility]      THORACOSCOPIC BLEBECTOMY Left 11/13/2014    Procedure: THORACOSCOPIC BLEBECTOMY;  Surgeon: Damaso Westbrook MD;  [facility]     THORACOSCOPIC PLEURODESIS Left 11/13/2014    Procedure: THORACOSCOPIC PLEURODESIS;  Surgeon: Damaso Westbrook MD;  [facility]     VENTRICULOSCOPY Right 3/25/2015    Procedure: VENTRICULOSCOPY;  Surgeon: Vasile Boyd MD;  [facility]       Family History   Problem Relation Age of Onset     Family History Negative Other        Review of Systems   Constitutional: Negative.    HENT: Positive for dental problem.    Eyes: Negative.    Respiratory: Negative.    Cardiovascular: Negative.    Gastrointestinal: Negative.    Endocrine: Negative.    Genitourinary: Negative.    Musculoskeletal: Negative.    Skin: Negative.    Allergic/Immunologic: Negative.    Neurological: Negative.    Hematological: Negative.    Psychiatric/Behavioral: Negative.        There were no vitals taken for this visit.    Physical Exam:    GENERAL: Healthy, alert and no distress  EYES: Eyes grossly normal to inspection.  No discharge or erythema, or obvious scleral/conjunctival abnormalities.  HENT: Normal cephalic/atraumatic. Thin hair.  External ears, nose and mouth without ulcers or lesions.  No nasal drainage visible. 6 Crowns as part of dentition.  RESP: No audible wheeze, cough, or visible cyanosis.  No visible retractions or increased work of breathing.    SKIN: Visible skin clear. No significant rash, abnormal pigmentation or  lesions.  NEURO:  Mentation and speech appropriate for age.  PSYCH: Affect normal/bright, judgement and insight intact, normal speech and appearance well-groomed.    No results found for any visits on 02/19/21.    Impression:  1.  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 Bone density and dental loss 2/2 chemotherapy and radiation   3. Post-operative changes but no evidence for recurrent tumor.      Plae:  1. Reviewed MRI with Steven.  Shared images. 2. Discussed that he will RTC in  for imaging and long-term follow up  3. Discussed the Long term f/u clinic and the benefits reviewed. Stressed the importance of continued follow-up.      Total time spent on the following services on the date of the encounter:  Preparing to see patient, chart review, review of outside records, Ordering imaging.  Interpretation of imaging.  Performing a medically appropriate examination , Counseling and educating the patient, Documenting clinical information in the electronic or other health record  and Care coordination 40 minutes

## 2021-02-19 NOTE — NURSING NOTE
"Steven Romeo is a 25 year old male who is being evaluated via a billable video visit.      The patient has been notified of following:     \"This video visit will be conducted via a call between you and your physician/provider. We have found that certain health care needs can be provided without the need for an in-person physical exam.  This service lets us provide the care you need with a video conversation.  If a prescription is necessary we can send it directly to your pharmacy.  If lab work is needed we can place an order for that and you can then stop by our lab to have the test done at a later time.    If during the course of the call the physician/provider feels a video visit is not appropriate, you will not be charged for this service.\"     Patient has given verbal consent for Video visit? Yes    Patient would like the video invitation sent by: Text to cell phone: [phone]    Video Start Time: 215    Steven Romeo complains of    Chief Complaint   Patient presents with     RECHECK         I have reviewed and updated the patient's Past Medical History, Social History, Family History and Medication List.    ALLERGIES  Gadolinium and Neupogen [filgrastim]    "

## 2021-02-19 NOTE — LETTER
"2/19/2021      RE: Steven Romeo  [address] [facility] [address]        The patient has been notified of following:      \"This video visit will be conducted via a call between you and your physician/provider. We have found that certain health care needs can be provided without the need for an in-person physical exam.  This service lets us provide the care you need with a video conversation.  If a prescription is necessary we can send it directly to your pharmacy.  If lab work is needed we can place an order for that and you can then stop by our lab to have the test done at a later time.     Video visits are billed at different rates depending on your insurance coverage.  Please reach out to your insurance provider with any questions.     If during the course of the call the physician/provider feels a video visit is not appropriate, you will not be charged for this service.\"     Patient has given verbal consent for Video visit? Yes     Video-Visit Details     Type of service:  Video Visit     Video Start Time: 2:53 PM  Video End Time: 3:08 PM    Originating Location (pt. Location): Home     Distant Location (provider location):  [facility]      Platform used for Video Visit: [employer]       Pediatric Hematology/Oncology Video Note     CC:  Steven Romeo is a 25 year old male with a history of pineal germ cell tumor who presents  for a follow up and to review MRI results.      HPI:   He has been fairly healthy. He had a cold over the winter but no other major illness.  Denies taking any new medications. No N/V, loss of appetite, unexplained weight change, unexplained fevers, cough, chest pain, abdominal pain, constipation, diarrhea, urinary symptoms, or rash.      He has had a lot of dental work done beginning in December 2019.  Currently has 6 crowns placed.        His mood has been good.  He is dealing pretty well dealing with the isolation of COVID.  He has a lot to keep him busy. . "       Fam/Soc:  He is living in [place].  He is not currently working.  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  He is going to [employer].  He is attending school full time. He was able to get some government grants as long as he attends full-time.  He made the honor roll.    Imaging:  FINDINGS: Postoperative change consisting of a small right frontal bone destini hole and surgical tract leading through the anterolateral  aspect of the right frontal lobe for resection of the pineal germ cell tumor again noted. No evidence for residual or recurrent tumor on  these non-contrast-enhanced images. The ventricles and basal cisterns are within normal limits in configuration. There is no midline shift. There are no extra-axial fluid collections. There is no evidence for  acute stroke or for acute intracranial hemorrhage. Gray-white differentiation is well maintained.     There is no sinusitis or mastoiditis.                                                                      IMPRESSION: Normal brain MRI. Postoperative changes of the right  frontal skull and right frontal lobe again noted. No evidence for residual or recurrent pineal region tumor on these non-contrast-enhanced images.      History was obtained from patient.       Allergies   Allergen Reactions     Gadolinium Itching     Redness/Itching immediately with Gadolinium injection.  Patient reacts even with premedication.  [facility] 4-1--2016     Neupogen [Filgrastim]        Current Outpatient Medications   Medication     ACETAMINOPHEN PO     Ascorbic Acid (VITAMIN C PO)     diphenhydrAMINE (BENADRYL) 25 MG tablet     guaiFENesin (MUCINEX) 600 MG 12 hr tablet     ketorolac (TORADOL) 10 MG tablet     Iakilxwkf-IXY-ZP-APAP (YANET-SELTZER PLUS COLD & FLU PO)     No current facility-administered medications for this visit.        Past Medical History:   Diagnosis Date     Cancer (H)     germinoma brain cancer     Hydrocephalus (H) 2015    See MRI/CT      Infectious mononucleosis     age 7     Other chronic pain     headaches     Pineal tumor 2015    see MRI/CT     Pneumonia 9/13/2011     Pneumothorax on left 11/2014    spontaneous     Spontaneous pneumothorax 11/9/2014       Past Surgical History:   Procedure Laterality Date     INSERT CHEST TUBE Left 11/10/2014    Procedure: INSERT CHEST TUBE;  Surgeon: Damaso Westbrook MD;  [facility]     REMOVE PORT VASCULAR ACCESS Right 10/12/2015    Procedure: REMOVE PORT VASCULAR ACCESS;  Surgeon: Renato Arroyo MD;  [facility]      THORACOSCOPIC BLEBECTOMY Left 11/13/2014    Procedure: THORACOSCOPIC BLEBECTOMY;  Surgeon: Damaso Westbrook MD;  [facility]     THORACOSCOPIC PLEURODESIS Left 11/13/2014    Procedure: THORACOSCOPIC PLEURODESIS;  Surgeon: Damaso Westbrook MD;  [facility]     VENTRICULOSCOPY Right 3/25/2015    Procedure: VENTRICULOSCOPY;  Surgeon: Vasile Boyd MD;  [facility]       Family History   Problem Relation Age of Onset     Family History Negative Other        Review of Systems   Constitutional: Negative.    HENT: Positive for dental problem.    Eyes: Negative.    Respiratory: Negative.    Cardiovascular: Negative.    Gastrointestinal: Negative.    Endocrine: Negative.    Genitourinary: Negative.    Musculoskeletal: Negative.    Skin: Negative.    Allergic/Immunologic: Negative.    Neurological: Negative.    Hematological: Negative.    Psychiatric/Behavioral: Negative.        There were no vitals taken for this visit.    Physical Exam:    GENERAL: Healthy, alert and no distress  EYES: Eyes grossly normal to inspection.  No discharge or erythema, or obvious scleral/conjunctival abnormalities.  HENT: Normal cephalic/atraumatic. Thin hair.  External ears, nose and mouth without ulcers or lesions.  No nasal drainage visible. 6 Crowns as part of dentition.  RESP: No audible wheeze, cough, or visible cyanosis.  No visible retractions or increased work of  breathing.    SKIN: Visible skin clear. No significant rash, abnormal pigmentation or lesions.  NEURO:  Mentation and speech appropriate for age.  PSYCH: Affect normal/bright, judgement and insight intact, normal speech and appearance well-groomed.    No results found for any visits on 02/19/21.    Impression:  1.  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 Bone density and dental loss 2/2 chemotherapy and radiation   3. Post-operative changes but no evidence for recurrent tumor.      Plae:  1. Reviewed MRI with Steven.  Shared images. 2. Discussed that he will RTC in 1 for imaging and long-term follow up  3. Discussed the Long term f/u clinic and the benefits reviewed. Stressed the importance of continued follow-up.      Total time spent on the following services on the date of the encounter:  Preparing to see patient, chart review, review of outside records, Ordering imaging.  Interpretation of imaging.  Performing a medically appropriate examination , Counseling and educating the patient, Documenting clinical information in the electronic or other health record  and Care coordination 40 minutes      EFRAÍN Duong CNP

## 2021-05-21 ENCOUNTER — OFFICE VISIT (OUTPATIENT)
Dept: FAMILY MEDICINE | Facility: CLINIC | Age: 26
End: 2021-05-21
Payer: COMMERCIAL

## 2021-05-21 VITALS
HEART RATE: 66 BPM | SYSTOLIC BLOOD PRESSURE: 120 MMHG | WEIGHT: 138 LBS | BODY MASS INDEX: 20.92 KG/M2 | RESPIRATION RATE: 14 BRPM | DIASTOLIC BLOOD PRESSURE: 80 MMHG | TEMPERATURE: 98.1 F | HEIGHT: 68 IN | OXYGEN SATURATION: 100 %

## 2021-05-21 DIAGNOSIS — Z92.3 HISTORY OF RADIATION THERAPY: ICD-10-CM

## 2021-05-21 DIAGNOSIS — F41.1 GAD (GENERALIZED ANXIETY DISORDER): Primary | ICD-10-CM

## 2021-05-21 DIAGNOSIS — Z92.21 STATUS POST CHEMOTHERAPY: ICD-10-CM

## 2021-05-21 DIAGNOSIS — F41.0 PANIC ATTACKS: ICD-10-CM

## 2021-05-21 DIAGNOSIS — D44.5 PINEAL GERM CELL TUMOR (H): Chronic | ICD-10-CM

## 2021-05-21 DIAGNOSIS — G91.8 OTHER HYDROCEPHALUS (H): ICD-10-CM

## 2021-05-21 LAB
HGB BLD-MCNC: 15.7 G/DL (ref 13.3–17.7)
TSH SERPL DL<=0.005 MIU/L-ACNC: 0.71 MU/L (ref 0.4–4)

## 2021-05-21 PROCEDURE — 99204 OFFICE O/P NEW MOD 45 MIN: CPT | Performed by: FAMILY MEDICINE

## 2021-05-21 PROCEDURE — 36415 COLL VENOUS BLD VENIPUNCTURE: CPT | Performed by: FAMILY MEDICINE

## 2021-05-21 PROCEDURE — 84443 ASSAY THYROID STIM HORMONE: CPT | Performed by: FAMILY MEDICINE

## 2021-05-21 PROCEDURE — 85018 HEMOGLOBIN: CPT | Performed by: FAMILY MEDICINE

## 2021-05-21 RX ORDER — DULOXETIN HYDROCHLORIDE 30 MG/1
CAPSULE, DELAYED RELEASE ORAL
Qty: 46 CAPSULE | Refills: 0 | Status: SHIPPED | OUTPATIENT
Start: 2021-05-21 | End: 2022-03-04

## 2021-05-21 ASSESSMENT — ANXIETY QUESTIONNAIRES
2. NOT BEING ABLE TO STOP OR CONTROL WORRYING: SEVERAL DAYS
3. WORRYING TOO MUCH ABOUT DIFFERENT THINGS: SEVERAL DAYS
GAD7 TOTAL SCORE: 5
IF YOU CHECKED OFF ANY PROBLEMS ON THIS QUESTIONNAIRE, HOW DIFFICULT HAVE THESE PROBLEMS MADE IT FOR YOU TO DO YOUR WORK, TAKE CARE OF THINGS AT HOME, OR GET ALONG WITH OTHER PEOPLE: SOMEWHAT DIFFICULT
1. FEELING NERVOUS, ANXIOUS, OR ON EDGE: MORE THAN HALF THE DAYS
6. BECOMING EASILY ANNOYED OR IRRITABLE: NOT AT ALL
5. BEING SO RESTLESS THAT IT IS HARD TO SIT STILL: NOT AT ALL
7. FEELING AFRAID AS IF SOMETHING AWFUL MIGHT HAPPEN: NOT AT ALL

## 2021-05-21 ASSESSMENT — PAIN SCALES - GENERAL: PAINLEVEL: MILD PAIN (2)

## 2021-05-21 ASSESSMENT — MIFFLIN-ST. JEOR: SCORE: 1577.52

## 2021-05-21 ASSESSMENT — PATIENT HEALTH QUESTIONNAIRE - PHQ9
SUM OF ALL RESPONSES TO PHQ QUESTIONS 1-9: 3
5. POOR APPETITE OR OVEREATING: SEVERAL DAYS

## 2021-05-21 NOTE — RESULT ENCOUNTER NOTE
Steven,  Your results are normal.  Please let me know if you have any questions.    Sincerely,  Dr. Flores

## 2021-05-21 NOTE — NURSING NOTE
Health Maintenance Due   Topic Date Due     ANNUAL REVIEW OF HM ORDERS  Never done     ADVANCE CARE PLANNING  Never done     HPV IMMUNIZATION (1 - Male 2-dose series) Never done     COVID-19 Vaccine (1) Never done     PREVENTIVE CARE VISIT  04/19/2008     Maine Zamarripa, [facility]

## 2021-05-21 NOTE — PROGRESS NOTES
Assessment & Plan     ASSESSMENT/ORDERS:    ICD-10-CM    1. FREDA (generalized anxiety disorder)  F41.1 DULoxetine (CYMBALTA) 30 MG capsule     TSH with free T4 reflex     NEUROLOGY ADULT REFERRAL     Hemoglobin   2. Panic attacks  F41.0 NEUROLOGY ADULT REFERRAL     Hemoglobin   3. Pineal germ cell tumor (H)  D44.5 NEUROLOGY ADULT REFERRAL   4. Other hydrocephalus (H)  G91.8 NEUROLOGY ADULT REFERRAL   5. Status post chemotherapy  Z92.21 NEUROLOGY ADULT REFERRAL     PLAN:  1.  Patient is going to start duloxetine for anxiety and panic attacks. He will take one pill daily for two weeks, then increase to BID for two weeks and then will be seen again for follow up. Discussed that this may also help with neuropathy that he occasionally experiences.      2. Neuropsych testing referral ordered for evaluation because of history of brain tumor, s/p radiation and chemo to evaluate for etiology of anxiety and sleep disturbances.      3. Patient is going to have TSH and hgb done today because of history of chemo and radiation to evaluate for possible etiology contributing to his anxiety.     4. Discuss back pain at future visit.     5. Discussed HPV and COVID-19 vaccine today. His questions were all answered and he is going to think about these for the future.               Tobacco Cessation:   reports that he has been smoking. He has a 0.25 pack-year smoking history. He has never used smokeless tobacco.      Follow up plans:   Return in about 4 weeks (around 6/18/2021) for medication recheck.    Patient was seen and examined by myself and Eugene Flores MD. The note was then scribed by me.     Estella Herman, MS4  May 21, 2021    This patient was seen and examined by myself as well as the medical student.  The medical student scribed the note and I have reviewed it, edited it appropriately, and agree with the final documentation.     Eugene Flores MD  [facility]  "    New Patient/Transfer of Care    Steven is a 25 year old who presents for the following health issues     Patient has a history of spontaneous pneumothorax in 2014 s/p pleurodesis.     History of pineal germinoma, s/p chemo and focal radiation therapy. Also had secondary hydrocephalus, s/p shunt and drain placement at that time. Currently in completely long-term remission. He receives his care at the [facility]. Has yearly brain MRI done, last 2 months ago and was normal.     He does endorses a history of anxiety and reported PTSD and anxiety attacks. States that most of his anxiety revolves around medical and dental procedures, although it does spill over into his every day life. He has trouble sleeping at night and reports that his mind is constantly going. He has had numerous dental procedures done because of the chemo and radiation and reports that he needs to be sedated for previous procedures.     Review of Systems         Objective    /80   Pulse 66   Temp 98.1  F (36.7  C) (Temporal)   Resp 14   Ht 1.715 m (5' 7.5\")   Wt 62.6 kg (138 lb)   SpO2 100%   BMI 21.29 kg/m    Body mass index is 21.29 kg/m .  Physical Exam  Vitals signs and nursing note reviewed.   Constitutional:       Appearance: Normal appearance.   HENT:      Head: Normocephalic and atraumatic.      Right Ear: External ear normal.      Left Ear: External ear normal.      Nose: Nose normal.      Mouth/Throat:      Mouth: Mucous membranes are moist.      Pharynx: Oropharynx is clear.   Eyes:      General:         Right eye: No discharge.         Left eye: No discharge.      Extraocular Movements: Extraocular movements intact.      Conjunctiva/sclera: Conjunctivae normal.   Neck:      Musculoskeletal: Normal range of motion.   Cardiovascular:      Rate and Rhythm: Normal rate and regular rhythm.   Pulmonary:      Effort: Pulmonary effort is normal. No respiratory distress.      Breath sounds: Normal breath sounds. No " wheezing, rhonchi or rales.   Musculoskeletal: Normal range of motion.         General: No deformity.   Neurological:      General: No focal deficit present.      Mental Status: He is alert and oriented to person, place, and time.   Psychiatric:         Mood and Affect: Mood normal.         Behavior: Behavior normal.         Thought Content: Thought content normal.         Judgment: Judgment normal.

## 2021-05-22 ASSESSMENT — ANXIETY QUESTIONNAIRES: GAD7 TOTAL SCORE: 5

## 2021-06-02 ENCOUNTER — TELEPHONE (OUTPATIENT)
Dept: PEDIATRIC HEMATOLOGY/ONCOLOGY | Facility: CLINIC | Age: 26
End: 2021-06-02

## 2021-09-14 DIAGNOSIS — H53.40 VISUAL FIELD DEFECT: Primary | ICD-10-CM

## 2021-09-19 ENCOUNTER — HEALTH MAINTENANCE LETTER (OUTPATIENT)
Age: 26
End: 2021-09-19

## 2022-01-09 ENCOUNTER — HEALTH MAINTENANCE LETTER (OUTPATIENT)
Age: 27
End: 2022-01-09

## 2022-01-28 ENCOUNTER — HOSPITAL ENCOUNTER (EMERGENCY)
Facility: CLINIC | Age: 27
Discharge: HOME | End: 2022-01-28
Attending: FAMILY MEDICINE | Admitting: FAMILY MEDICINE
Payer: MEDICAID

## 2022-01-28 VITALS
DIASTOLIC BLOOD PRESSURE: 66 MMHG | TEMPERATURE: 98.2 F | BODY MASS INDEX: 19.7 KG/M2 | OXYGEN SATURATION: 99 % | RESPIRATION RATE: 16 BRPM | WEIGHT: 130 LBS | HEIGHT: 68 IN | HEART RATE: 74 BPM | SYSTOLIC BLOOD PRESSURE: 130 MMHG

## 2022-01-28 DIAGNOSIS — S76.211A INGUINAL STRAIN, RIGHT, INITIAL ENCOUNTER: ICD-10-CM

## 2022-01-28 LAB
ALBUMIN UR-MCNC: NEGATIVE MG/DL
AMORPH CRY #/AREA URNS HPF: ABNORMAL /HPF
APPEARANCE UR: ABNORMAL
BACTERIA #/AREA URNS HPF: ABNORMAL /HPF
BILIRUB UR QL STRIP: NEGATIVE
COLOR UR AUTO: YELLOW
GLUCOSE UR STRIP-MCNC: NEGATIVE MG/DL
HGB UR QL STRIP: ABNORMAL
KETONES UR STRIP-MCNC: NEGATIVE MG/DL
LEUKOCYTE ESTERASE UR QL STRIP: NEGATIVE
MUCOUS THREADS #/AREA URNS LPF: PRESENT /LPF
NITRATE UR QL: NEGATIVE
PH UR STRIP: 6 [PH] (ref 5–7)
RBC URINE: 2 /HPF
SP GR UR STRIP: 1.02 (ref 1–1.03)
UROBILINOGEN UR STRIP-MCNC: 2 MG/DL
WBC URINE: 1 /HPF

## 2022-01-28 PROCEDURE — 96372 THER/PROPH/DIAG INJ SC/IM: CPT | Performed by: FAMILY MEDICINE

## 2022-01-28 PROCEDURE — 99284 EMERGENCY DEPT VISIT MOD MDM: CPT | Performed by: FAMILY MEDICINE

## 2022-01-28 PROCEDURE — 81001 URINALYSIS AUTO W/SCOPE: CPT | Performed by: FAMILY MEDICINE

## 2022-01-28 PROCEDURE — 250N000011 HC RX IP 250 OP 636: Performed by: FAMILY MEDICINE

## 2022-01-28 RX ORDER — CYCLOBENZAPRINE HCL 10 MG
10 TABLET ORAL 3 TIMES DAILY PRN
Qty: 20 TABLET | Refills: 0 | Status: SHIPPED | OUTPATIENT
Start: 2022-01-28 | End: 2022-02-03

## 2022-01-28 RX ORDER — KETOROLAC TROMETHAMINE 30 MG/ML
60 INJECTION, SOLUTION INTRAMUSCULAR; INTRAVENOUS ONCE
Status: COMPLETED | OUTPATIENT
Start: 2022-01-28 | End: 2022-01-28

## 2022-01-28 RX ORDER — NAPROXEN 500 MG/1
500 TABLET ORAL 2 TIMES DAILY PRN
Qty: 20 TABLET | Refills: 0 | Status: SHIPPED | OUTPATIENT
Start: 2022-01-28

## 2022-01-28 RX ADMIN — KETOROLAC TROMETHAMINE 60 MG: 30 INJECTION, SOLUTION INTRAMUSCULAR at 04:12

## 2022-01-28 ASSESSMENT — MIFFLIN-ST. JEOR: SCORE: 1544.18

## 2022-01-28 NOTE — ED PROVIDER NOTES
History     Chief Complaint   Patient presents with     Groin Pain     HPI  Steven Romeo is a 26 year old male who presents with right groin pain that started after lifting some heavy things the other day.  Patient states that he was lifting something very heavy when he started to have the pain.  The pain got significantly worse.  Patient states that the pain is worse with movement, better if he presses on the area.  Denies any trouble urinating or bowel movements.  Pain does not radiate down either one of his legs.  Patient states Tylenol has not helped but ibuprofen did a little bit.    Allergies:  Allergies   Allergen Reactions     Gadolinium Itching     Redness/Itching immediately with Gadolinium injection.  Patient reacts even with premedication.  ksa 4-1--2016     Neupogen [Filgrastim]        Problem List:    Patient Active Problem List    Diagnosis Date Noted     History of radiation therapy (focal radiation of brain) 05/21/2021     Priority: Medium     Nightmares 04/09/2018     Priority: Medium     FREDA (generalized anxiety disorder) 04/09/2018     Priority: Medium     Panic attacks 04/09/2018     Priority: Medium     PTSD (post-traumatic stress disorder) 04/09/2018     Priority: Medium     Callus of foot-right 04/05/2018     Priority: Medium     Status post chemotherapy 07/01/2015     Priority: Medium     Health Care Home 06/30/2015     Priority: Medium     Status:  Declined  Care Coordinator:  Elvie Walsh    See Letters for [facility] Care Plan  Date:  June 30, 2015         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     Priority: Medium     Hydrocephalus (H)      Priority: Medium     S/p third ventriculostomy 3/25/15       Tobacco abuse 11/09/2014     Priority: Medium     Moderate recurrent major depression (H) 12/03/2013     Priority: Medium        Past Medical History:    Past Medical History:   Diagnosis Date     Cancer (H)      Hydrocephalus (H) 2015     Infectious mononucleosis      Other chronic pain       "Pineal tumor 2015     Pneumonia 2011     Pneumothorax on left 2014     Spontaneous pneumothorax 2014       Past Surgical History:    Past Surgical History:   Procedure Laterality Date     INSERT CHEST TUBE Left 11/10/2014    Procedure: INSERT CHEST TUBE;  Surgeon: Damaso Westbrook MD;  Location: [facility] OR     REMOVE PORT VASCULAR ACCESS Right 10/12/2015    Procedure: REMOVE PORT VASCULAR ACCESS;  Surgeon: Renato Arroyo MD;  Location: [facility]      THORACOSCOPIC BLEBECTOMY Left 2014    Procedure: THORACOSCOPIC BLEBECTOMY;  Surgeon: Damaso Westbroko MD;  Location: [facility] OR     THORACOSCOPIC PLEURODESIS Left 2014    Procedure: THORACOSCOPIC PLEURODESIS;  Surgeon: Damaso Westbrook MD;  Location: [facility] OR     VENTRICULOSCOPY Right 3/25/2015    Procedure: VENTRICULOSCOPY;  Surgeon: Vasile Boyd MD;  Location: [facility]       Family History:    Family History   Problem Relation Age of Onset     Family History Negative Other        Social History:  Marital Status:  Single [1]  Social History     Tobacco Use     Smoking status: Current Every Day Smoker     Packs/day: 0.25     Years: 1.00     Pack years: 0.25     Last attempt to quit: 2014     Years since quittin.1     Smokeless tobacco: Never Used     Tobacco comment: e cig   Substance Use Topics     Alcohol use: Yes     Alcohol/week: 0.0 standard drinks     Comment: occasionally     Drug use: Yes     Types: Marijuana     Comment: rarely        Medications:    cyclobenzaprine (FLEXERIL) 10 MG tablet  naproxen (NAPROSYN) 500 MG tablet  ACETAMINOPHEN PO  DULoxetine (CYMBALTA) 30 MG capsule          Review of Systems   All other systems reviewed and are negative.      Physical Exam   BP: (!) 144/86  Pulse: 80  Temp: 98.4  F (36.9  C)  Resp: 18  Height: 172.7 cm (5' 8\")  Weight: 59 kg (130 lb)  SpO2: 99 %      Physical Exam  Vitals and nursing note reviewed.   Constitutional:       General: He is not in acute distress.     " Appearance: Normal appearance. He is not ill-appearing or diaphoretic.   Genitourinary:     Penis: Normal.       Testes: Cremasteric reflex is present.         Right: Mass, tenderness, swelling, testicular hydrocele or varicocele not present. Right testis is descended. Cremasteric reflex is present.       Epididymis:      Right: Tenderness present.   Neurological:      Mental Status: He is alert.         ED Course                 Procedures      Results for orders placed or performed during the hospital encounter of 01/28/22 (from the past 24 hour(s))   UA with Microscopic reflex to Culture    Specimen: Urine, Clean Catch   Result Value Ref Range    Color Urine Yellow Colorless, Straw, Light Yellow, Yellow    Appearance Urine Slightly Cloudy (A) Clear    Glucose Urine Negative Negative mg/dL    Bilirubin Urine Negative Negative    Ketones Urine Negative Negative mg/dL    Specific Gravity Urine 1.023 1.003 - 1.035    Blood Urine Small (A) Negative    pH Urine 6.0 5.0 - 7.0    Protein Albumin Urine Negative Negative mg/dL    Urobilinogen Urine 2.0 Normal, 2.0 mg/dL    Nitrite Urine Negative Negative    Leukocyte Esterase Urine Negative Negative    Bacteria Urine Few (A) None Seen /HPF    Mucus Urine Present (A) None Seen /LPF    Amorphous Crystals Urine Few (A) None Seen /HPF    RBC Urine 2 <=2 /HPF    WBC Urine 1 <=5 /HPF    Narrative    Urine Culture not indicated       Medications   ketorolac (TORADOL) injection 60 mg (60 mg Intramuscular Given 1/28/22 0412)     26-year-old male presents with right groin pain that started after lifting some heavy weights.  Patient has reproducible pain with palpation in his groin area.  Patient has no dysuria or hematuria.  There are no radicular-like symptoms.  Urine was obtained and there is no significant red cells in the urine, I do not suspect a kidney stone.  With the pain being reproducible with palpation, I think this is likely some type of muscle strain.  I do not suspect  testicular torsion as patient has no testicular tenderness.  We will treat with some Naprosyn for the next few days and a muscle relaxant.  Patient will follow up if there is no improvement over the next few days.    Assessments & Plan (with Medical Decision Making)  Right groin strain     I have reviewed the nursing notes.    I have reviewed the findings, diagnosis, plan and need for follow up with the patient.      New Prescriptions    CYCLOBENZAPRINE (FLEXERIL) 10 MG TABLET    Take 1 tablet (10 mg) by mouth 3 times daily as needed for muscle spasms    NAPROXEN (NAPROSYN) 500 MG TABLET    Take 1 tablet (500 mg) by mouth 2 times daily as needed for moderate pain       Final diagnoses:   Inguinal strain, right, initial encounter       1/28/2022   [facility] EMERGENCY DEPT     Aquiles Bahena MD  01/28/22 2347

## 2022-01-28 NOTE — Clinical Note
Steven Romeo was seen and treated in our emergency department on 1/28/2022.  He may return to work on 01/28/2022.  Patient is limited to no lifting more than 15 pounds for the next 2 days.     If you have any questions or concerns, please don't hesitate to call.      Aquiles Bahena MD

## 2022-02-17 DIAGNOSIS — D44.5 PINEAL GERM CELL TUMOR (H): Primary | ICD-10-CM

## 2022-02-17 DIAGNOSIS — Z92.21 STATUS POST CHEMOTHERAPY: ICD-10-CM

## 2022-02-17 DIAGNOSIS — Z92.3 HISTORY OF RADIATION THERAPY: ICD-10-CM

## 2022-02-28 ENCOUNTER — LAB (OUTPATIENT)
Dept: LAB | Facility: CLINIC | Age: 27
End: 2022-02-28
Payer: MEDICAID

## 2022-02-28 ENCOUNTER — HOSPITAL ENCOUNTER (OUTPATIENT)
Dept: MRI IMAGING | Facility: CLINIC | Age: 27
Discharge: HOME | End: 2022-02-28
Attending: NURSE PRACTITIONER | Admitting: NURSE PRACTITIONER
Payer: MEDICAID

## 2022-02-28 DIAGNOSIS — Z92.21 STATUS POST CHEMOTHERAPY: ICD-10-CM

## 2022-02-28 DIAGNOSIS — Z92.3 HISTORY OF RADIATION THERAPY: ICD-10-CM

## 2022-02-28 DIAGNOSIS — D44.5 PINEAL GERM CELL TUMOR (H): ICD-10-CM

## 2022-02-28 LAB
ALBUMIN SERPL-MCNC: 4.3 G/DL (ref 3.4–5)
ALP SERPL-CCNC: 50 U/L (ref 40–150)
ALT SERPL W P-5'-P-CCNC: 18 U/L (ref 0–70)
ANION GAP SERPL CALCULATED.3IONS-SCNC: 4 MMOL/L (ref 3–14)
AST SERPL W P-5'-P-CCNC: 14 U/L (ref 0–45)
BASOPHILS # BLD AUTO: 0 10E3/UL (ref 0–0.2)
BASOPHILS NFR BLD AUTO: 1 %
BILIRUB SERPL-MCNC: 0.4 MG/DL (ref 0.2–1.3)
BUN SERPL-MCNC: 9 MG/DL (ref 7–30)
CALCIUM SERPL-MCNC: 9 MG/DL (ref 8.5–10.1)
CHLORIDE BLD-SCNC: 104 MMOL/L (ref 94–109)
CO2 SERPL-SCNC: 29 MMOL/L (ref 20–32)
CREAT SERPL-MCNC: 0.79 MG/DL (ref 0.66–1.25)
EOSINOPHIL # BLD AUTO: 0.1 10E3/UL (ref 0–0.7)
EOSINOPHIL NFR BLD AUTO: 1 %
ERYTHROCYTE [DISTWIDTH] IN BLOOD BY AUTOMATED COUNT: 12 % (ref 10–15)
GFR SERPL CREATININE-BSD FRML MDRD: >90 ML/MIN/1.73M2
GLUCOSE BLD-MCNC: 100 MG/DL (ref 70–99)
HBA1C MFR BLD: 5.5 % (ref 0–5.6)
HCT VFR BLD AUTO: 42.9 % (ref 40–53)
HGB BLD-MCNC: 14.8 G/DL (ref 13.3–17.7)
IMM GRANULOCYTES # BLD: 0 10E3/UL
IMM GRANULOCYTES NFR BLD: 1 %
LYMPHOCYTES # BLD AUTO: 2.2 10E3/UL (ref 0.8–5.3)
LYMPHOCYTES NFR BLD AUTO: 42 %
MCH RBC QN AUTO: 29.9 PG (ref 26.5–33)
MCHC RBC AUTO-ENTMCNC: 34.5 G/DL (ref 31.5–36.5)
MCV RBC AUTO: 87 FL (ref 78–100)
MONOCYTES # BLD AUTO: 0.4 10E3/UL (ref 0–1.3)
MONOCYTES NFR BLD AUTO: 9 %
NEUTROPHILS # BLD AUTO: 2.4 10E3/UL (ref 1.6–8.3)
NEUTROPHILS NFR BLD AUTO: 46 %
NRBC # BLD AUTO: 0 10E3/UL
NRBC BLD AUTO-RTO: 0 /100
PLATELET # BLD AUTO: 249 10E3/UL (ref 150–450)
POTASSIUM BLD-SCNC: 4 MMOL/L (ref 3.4–5.3)
PROT SERPL-MCNC: 7.4 G/DL (ref 6.8–8.8)
RBC # BLD AUTO: 4.95 10E6/UL (ref 4.4–5.9)
SODIUM SERPL-SCNC: 137 MMOL/L (ref 133–144)
T4 FREE SERPL-MCNC: 0.87 NG/DL (ref 0.76–1.46)
TSH SERPL DL<=0.005 MIU/L-ACNC: 1.79 MU/L (ref 0.4–4)
WBC # BLD AUTO: 5.1 10E3/UL (ref 4–11)

## 2022-02-28 PROCEDURE — 83036 HEMOGLOBIN GLYCOSYLATED A1C: CPT

## 2022-02-28 PROCEDURE — 36415 COLL VENOUS BLD VENIPUNCTURE: CPT

## 2022-02-28 PROCEDURE — 84439 ASSAY OF FREE THYROXINE: CPT

## 2022-02-28 PROCEDURE — 84443 ASSAY THYROID STIM HORMONE: CPT

## 2022-02-28 PROCEDURE — 80053 COMPREHEN METABOLIC PANEL: CPT

## 2022-02-28 PROCEDURE — 70551 MRI BRAIN STEM W/O DYE: CPT

## 2022-02-28 PROCEDURE — 85025 COMPLETE CBC W/AUTO DIFF WBC: CPT

## 2022-03-01 ENCOUNTER — VIRTUAL VISIT (OUTPATIENT)
Dept: PEDIATRIC HEMATOLOGY/ONCOLOGY | Facility: CLINIC | Age: 27
End: 2022-03-01
Attending: NURSE PRACTITIONER
Payer: MEDICAID

## 2022-03-01 DIAGNOSIS — D44.5 PINEAL GERM CELL TUMOR (H): Primary | ICD-10-CM

## 2022-03-01 DIAGNOSIS — Z92.21 STATUS POST CHEMOTHERAPY: ICD-10-CM

## 2022-03-01 DIAGNOSIS — Z92.3 HISTORY OF RADIATION THERAPY: ICD-10-CM

## 2022-03-01 PROCEDURE — G0463 HOSPITAL OUTPT CLINIC VISIT: HCPCS | Mod: PN,RTG | Performed by: NURSE PRACTITIONER

## 2022-03-01 PROCEDURE — 99215 OFFICE O/P EST HI 40 MIN: CPT | Mod: 95 | Performed by: NURSE PRACTITIONER

## 2022-03-01 NOTE — NURSING NOTE
"Steven Romeo is a 26 year old male who is being evaluated via a billable video visit.      The patient has been notified of following:     \"This video visit will be conducted via a call between you and your physician/provider. We have found that certain health care needs can be provided without the need for an in-person physical exam.  This service lets us provide the care you need with a video conversation.  If a prescription is necessary we can send it directly to your pharmacy.  If lab work is needed we can place an order for that and you can then stop by our lab to have the test done at a later time.    If during the course of the call the physician/provider feels a video visit is not appropriate, you will not be charged for this service.\"     Patient has given verbal consent for Video visit? Yes    Patient would like the video invitation sent by: Text to cell phone: [phone]    Video Start Time: 1:22 PM    Steven Romeo complains of    Chief Complaint   Patient presents with     New Patient     Pt here for LTFU       Data Unavailable  Data Unavailable      I have reviewed and updated the patient's Past Medical History, Social History, Family History and Medication List.    ALLERGIES  Gadolinium and Neupogen [filgrastim]    "

## 2022-03-01 NOTE — LETTER
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  Steven was treated according to [facility]CO phase 2 protocol with Cisplatin/Etoposide and reduced dose radiation, he comes to clinic today for his initial cancer survivorship visit.  He is being seen over video.    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  He was treated at the [facility] by Dr. Luis Alberto Melo.  He received chemotherapy as per the JCO phase 2 study looking at Cisplatin/Etoposide and reduced dose radiation.  He received the following chemotherapy:  1.  Cisplatin IV with a cumulative dose 400 mg/m2  2.  Etoposide IV with a cumulative dose of 2000 mg/m2    Steven received radiation therapy as follows:  1.  Pineal gland which started on 7/23/2015 and completed on 8/18/2015 in 17 fractions for a total dose of 3060 cGY (tomotherapy) with Dr. Clark here at the [facility]    Steven had the following surgeries:  Past Surgical History:   Procedure Laterality Date     INSERT CHEST TUBE Left 11/10/2014    Procedure: INSERT CHEST TUBE;  Surgeon: Damaso Westbrook MD;  Location: [facility] OR     REMOVE PORT VASCULAR ACCESS Right 10/12/2015    Procedure: REMOVE PORT VASCULAR ACCESS;  Surgeon: Renato Arroyo MD;  Location: [facility] SED[facility]      THORACOSCOPIC BLEBECTOMY Left 11/13/2014    Procedure: THORACOSCOPIC BLEBECTOMY;  Surgeon: Damaso Westbrook MD;  Location: [facility] OR     THORACOSCOPIC PLEURODESIS Left 11/13/2014    Procedure: THORACOSCOPIC PLEURODESIS;  Surgeon: Damaso Westbrook MD;  Location: [facility] OR     VENTRICULOSCOPY Right 3/25/2015    Procedure: VENTRICULOSCOPY;  Surgeon: Vasile Boyd MD;  Location: [facility] OR     Late effects known to date include:  1.  Papilledema (resolved)  2.  Double vision (ongoing)  3.  Intermittent esotropia (ongoing)  4.  Dental  "caries    History of Present Illness:  Steven is here over video.  He is unaccompanied.  He has been doing well.  He had an MRI brain in [facility] yesterday.  Of note, he gets MRIs without contrast due to his allergy.  He hasn't been to the eye dr in a while.  Still reports having the same double vision issue.  Occasionally his mom will note that his eyes look \"lazy.\"  He hasn't noticed himself but he says that his mom has mentioned it to him.  Has been having a lot of issues with his teeth.  Has a lot of crowns and looking to get some partial dentures now.  He has some bone loss in his jaw so they didn't want to try implants.  Last went to the dentist 1 month ago.  Has been going to school full time to learn electrical engineering.  Also working PT at [employer].  This is his last semester in school.  No issues with HA.  Has been tired but he thinks that is related to his busy schedule.  He sleeps 6-7 hours per night.  No dizziness.  No heart issues.  No N/V/D/C.  Appetite has been good.  Did have COVID in 1/2022.  Mild symptoms with 10 days of low grade fever and congestion.  No residual issues.  No recent fractures.      Review of systems:  General:   Good appetite, strong energy level, sleeping well.  No fever, no pain.  HEENT:  No changes in vision or hearing.  No headache.  No rhinorrhea.     Respiratory: No SOB.  No coughing or wheezing.  Cardiovascular: No chest pain or palpitations  Endocrine:  No hot/cold intolerance.  No increase thirst or urination.  GI:  No nausea, vomiting, diarrhea or constipation.  No abdominal pain.  : No difficulty with urination.  Skin: No rashes, bruises, petechiae or other skin lesions noted.  Neuro: No weakness or numbness.  MSK:  No change in ROM or function.  The remainder of review of systems is complete and negative.    PMH:   Past Medical History:   Diagnosis Date     Cancer (H)     germinoma brain cancer     Hydrocephalus (H) 2015    See MRI/CT     Infectious " mononucleosis     age 7     Other chronic pain     headaches     Pineal tumor 2015    see MRI/CT     Pneumonia 9/13/2011     Pneumothorax on left 11/2014    spontaneous     Spontaneous pneumothorax 11/9/2014       PFMH:   Family History   Problem Relation Age of Onset     Family History Negative Other        Social History: Lives in [place].  In school for electrical engineering with 1 semester left.  Working PT at [employer].    Current Medications: has a current medication list which includes the following prescription(s): acetaminophen, levofloxacin, and naproxen.    Physical Exam: There were no vitals taken for this visit.   GENERAL: Healthy, alert and no distress  EYES: Eyes grossly normal to inspection.  No discharge or erythema, or obvious scleral/conjunctival abnormalities.  HENT: Normal cephalic/atraumatic.  External ears, nose and mouth without ulcers or lesions.  No nasal drainage visible.  RESP: No audible wheeze, cough, or visible cyanosis.  No visible retractions or increased work of breathing.    SKIN: Visible skin clear. No significant rash, abnormal pigmentation or lesions.  NEURO: Cranial nerves grossly intact.  Mentation and speech appropriate for age.  PSYCH: Mentation appears normal, affect normal/bright, judgement and insight intact, normal speech and appearance well-groomed.      Labs:   Lab on 02/28/2022   Component Date Value Ref Range Status     Sodium 02/28/2022 137  133 - 144 mmol/L Final     Potassium 02/28/2022 4.0  3.4 - 5.3 mmol/L Final     Chloride 02/28/2022 104  94 - 109 mmol/L Final     Carbon Dioxide (CO2) 02/28/2022 29  20 - 32 mmol/L Final     Anion Gap 02/28/2022 4  3 - 14 mmol/L Final     Urea Nitrogen 02/28/2022 9  7 - 30 mg/dL Final     Creatinine 02/28/2022 0.79  0.66 - 1.25 mg/dL Final     Calcium 02/28/2022 9.0  8.5 - 10.1 mg/dL Final     Glucose 02/28/2022 100 (A) 70 - 99 mg/dL Final     Alkaline Phosphatase 02/28/2022 50  40 - 150 U/L Final     AST 02/28/2022 14  0 - 45  U/L Final     ALT 02/28/2022 18  0 - 70 U/L Final     Protein Total 02/28/2022 7.4  6.8 - 8.8 g/dL Final     Albumin 02/28/2022 4.3  3.4 - 5.0 g/dL Final     Bilirubin Total 02/28/2022 0.4  0.2 - 1.3 mg/dL Final     GFR Estimate 02/28/2022 >90  >60 mL/min/1.73m2 Final    Effective December 21, 2021 eGFRcr in adults is calculated using the 2021 CKD-EPI creatinine equation which includes age and gender (Tushar et al., NE, DOI: 10.1056/PVDExo9652701)     TSH 02/28/2022 1.79  0.40 - 4.00 mU/L Final     Free T4 02/28/2022 0.87  0.76 - 1.46 ng/dL Final     Hemoglobin A1C 02/28/2022 5.5  0.0 - 5.6 % Final    Normal <5.7%   Prediabetes 5.7-6.4%    Diabetes 6.5% or higher     Note: Adopted from ADA consensus guidelines.     WBC Count 02/28/2022 5.1  4.0 - 11.0 10e3/uL Final     RBC Count 02/28/2022 4.95  4.40 - 5.90 10e6/uL Final     Hemoglobin 02/28/2022 14.8  13.3 - 17.7 g/dL Final     Hematocrit 02/28/2022 42.9  40.0 - 53.0 % Final     MCV 02/28/2022 87  78 - 100 fL Final     MCH 02/28/2022 29.9  26.5 - 33.0 pg Final     MCHC 02/28/2022 34.5  31.5 - 36.5 g/dL Final     RDW 02/28/2022 12.0  10.0 - 15.0 % Final     Platelet Count 02/28/2022 249  150 - 450 10e3/uL Final     % Neutrophils 02/28/2022 46  % Final     % Lymphocytes 02/28/2022 42  % Final     % Monocytes 02/28/2022 9  % Final     % Eosinophils 02/28/2022 1  % Final     % Basophils 02/28/2022 1  % Final     % Immature Granulocytes 02/28/2022 1  % Final     NRBCs per 100 WBC 02/28/2022 0  <1 /100 Final     Absolute Neutrophils 02/28/2022 2.4  1.6 - 8.3 10e3/uL Final     Absolute Lymphocytes 02/28/2022 2.2  0.8 - 5.3 10e3/uL Final     Absolute Monocytes 02/28/2022 0.4  0.0 - 1.3 10e3/uL Final     Absolute Eosinophils 02/28/2022 0.1  0.0 - 0.7 10e3/uL Final     Absolute Basophils 02/28/2022 0.0  0.0 - 0.2 10e3/uL Final     Absolute Immature Granulocytes 02/28/2022 0.0  <=0.4 10e3/uL Final     Absolute NRBCs 02/28/2022 0.0  10e3/uL Final        Radiology:  MR BRAIN  W/O CONTRAST 2/28/2022 3:57 PM     INDICATION: Brain mass or lesion; ; Pineal germ cell tumor (H)  TECHNIQUE: Noncontrast MRI of the brain.  CONTRAST: None  COMPARISON: February 18, 2021     FINDINGS:  No abnormal restricted diffusion to suggest acute infarct.  Postsurgical changes right frontal craniotomy with surgical tract  through the anterolateral aspect of the right frontal lobe for  resection of a pineal germ cell tumor. No findings to suggest residual  or recurrent tumor on these noncontrast images. Brain signal  morphology otherwise normal. The ventricles and sulci are normal for  age. No evidence of acute hemorrhage, mass effect, or extra-axial  collection. No cerebellar tonsillar ectopia.     The globes are unremarkable. Impression enlarged paranasal sinuses are  unremarkable. Trace left mastoid. The right mastoid air cells are  unremarkable. The visualized skull base and calvarium are  unremarkable.                                                                      IMPRESSION:    1.  Postsurgical changes right frontal craniotomy with surgical tract  through the anterolateral aspect of the right frontal lobe for  resection of a pineal germ cell tumor. No findings to suggest residual  or recurrent tumor on these noncontrast images.   2.  No evidence of acute internal hemorrhage, mass effect, or  infarction     DIPAK MARTIN MD          Assessment:  Steven Romeo is a 26 year old  male with a pineal germ cell tumor treated according to JCO phase 2 protocol with Cisplatin and Etoposide/reduced radiation who comes to clinic today (video) for his initial cancer survivorship visit.  He had imaging yesterday that showed no evidence of disease recurrence or secondary neoplasm.  He was reviewed at our comprehensive neuro-oncology conference today and they agreed with the MRI findings.  He continues to have issues with double vision and dental caries.  The following are our long term follow up  recommendations:      Plan:     1.  RISK OF RECURRENCE:  Steven has a history of pineal GCT that completed therapy in 8/2015.  He has been off therapy more than 5 years with no signs of disease recurrence.  We discussed transitioning to every other year MRIs due to his radiation history and risk for secondary neoplasm.  We will plan for another MRI in 2024.    2.  PSYCHOSOCIAL EFFECTS:  Steven received cranial radiation and risk for learning issues.  He is currently in college and reports no concerns.  If he has any new issues with attention span, memory, processing speed then we would recommend he have formal neuropsychological testing at that time. He will also have a routine assessment with social work.    3.  RISK FOR HEARING LOSS:  Steven has a history of cranial radiation and Cisplatin chemotherapy with risk for hearing loss.  No current concerns with hearing.  Consider repeat hearing testing at future appt.    4.  RISK FOR RENAL TOXICITY: Steven has a history of cisplatin chemo and risk for toxicity to his kidneys.  CMP done yesterday was normal.  We will plan to recheck as needed.  Review of recent visits and BP was normal.  Will recheck urinary history at future appts.    5.  MALE ISSUES RELATED TO FERTILITY:  Steven has a history of heavy metal chemotherapy and radiation which can affect his fertility.  We discussed that if Steven would like to know his fertility status, we could perform a semen analysis in our andrology lab.    6.  RISK FOR SECONDARY NEOPLASMS: Steven has a history of cranial radiation and chemotherapy which increases the risk of secondary neoplasms.  He should have a yearly history for signs of anemia, infection and bleeding.  Consider yearly CBC until 10 years off therapy.  In addition, he should have an MRI brain every other year now due to risk for secondary brain tumors s/p radiation.  He should wear SPF 50 sunscreen when outdoors.  Do not smoke or use recreational drugs; drinking alcohol  only in moderation.      7. VISION:  Steven has a history of CNS surgery and radiation.  He has some residual issues with double vision.  He should have a yearly eye dr visit.  We will help him set one up for the near future.    8.  DENTAL:  Steven has a history of CNS radiation and risk for issues with dental caries and other dental abnormalities in the radiated tissue.  He should see the dentist every 6 months for check ups as well as doing diligent dental care at home.      Education:  Reviewed health links with Steven and mailed copies to the home.    It was a pleasure meeting Steven in our [facility] clinic today.  We appreciate the opportunity to participate in his care.  If you have any questions or concerns, I can be reached at [phone].  We ask that Steven return to the clinic in 1 year with labs.  We will plan for another MRI in 2 years.    Karina Mary MSN, APRN, CPNP-AC, CPON  Department of Pediatrics  Division of Hematology/Oncology    Review of the result(s) of each unique test - CBC, Hgb A1C, CMP, TSH, free T4, MRI brain  Discussion of management or test interpretation with external physician/other qualified healthcare professional/appropriate source - Discussion with Dr. Melo at neuro oncology conference 3/1/22  Total time spent on the following services on the date of the encounter:  Preparing to see patient, chart review, review of outside records, Ordering medications, test, procedures, chemotherapy, Referring or communicating with other healthcare professionals, Interpretation of labs, imaging and other tests, Performing a medically appropriate examination , Counseling and educating the patient/family/caregiver , Documenting clinical information in the electronic or other health record , Communicating results to the patient/family/caregiver , Care coordination  and Total time spent: 65 min with additional chart review for treatment summary      EFRAÍN Cota CNP

## 2022-03-04 ENCOUNTER — OFFICE VISIT (OUTPATIENT)
Dept: FAMILY MEDICINE | Facility: OTHER | Age: 27
End: 2022-03-04
Payer: MEDICAID

## 2022-03-04 VITALS
RESPIRATION RATE: 16 BRPM | OXYGEN SATURATION: 100 % | SYSTOLIC BLOOD PRESSURE: 112 MMHG | WEIGHT: 139.5 LBS | TEMPERATURE: 97 F | HEIGHT: 68 IN | BODY MASS INDEX: 21.14 KG/M2 | HEART RATE: 80 BPM | DIASTOLIC BLOOD PRESSURE: 72 MMHG

## 2022-03-04 DIAGNOSIS — N50.811 PAIN IN RIGHT TESTICLE: Primary | ICD-10-CM

## 2022-03-04 DIAGNOSIS — N50.89 SCROTAL MASS: ICD-10-CM

## 2022-03-04 PROCEDURE — 99214 OFFICE O/P EST MOD 30 MIN: CPT | Performed by: PHYSICIAN ASSISTANT

## 2022-03-04 RX ORDER — LEVOFLOXACIN 500 MG/1
500 TABLET, FILM COATED ORAL DAILY
Qty: 10 TABLET | Refills: 0 | Status: SHIPPED | OUTPATIENT
Start: 2022-03-04

## 2022-03-04 ASSESSMENT — PAIN SCALES - GENERAL: PAINLEVEL: MILD PAIN (2)

## 2022-03-04 ASSESSMENT — PATIENT HEALTH QUESTIONNAIRE - PHQ9
SUM OF ALL RESPONSES TO PHQ QUESTIONS 1-9: 1
SUM OF ALL RESPONSES TO PHQ QUESTIONS 1-9: 1
10. IF YOU CHECKED OFF ANY PROBLEMS, HOW DIFFICULT HAVE THESE PROBLEMS MADE IT FOR YOU TO DO YOUR WORK, TAKE CARE OF THINGS AT HOME, OR GET ALONG WITH OTHER PEOPLE: NOT DIFFICULT AT ALL

## 2022-03-04 NOTE — PROGRESS NOTES
Assessment & Plan     Pain in right testicle  Discussed with patient use of 10 day course of antibiotic for possible epididymitis given sensitivity to contact and improvement with ice. He will wear more supportive undergarments to help reduce discomfort. Reviewed possible adverse effects of the medication and advise he call if not tolerating.   - levofloxacin (LEVAQUIN) 500 MG tablet; Take 1 tablet (500 mg) by mouth daily    Scrotal mass  Will further evaluate the mass with US. Patient will call [facility] to have this completed as this is his preferred location. I will monitor for results.   - US Testicular & Scrotum w Doppler Ltd; Future     [facility] YAMILTEH Harris is a 26 year old who presents for the following health issues     History of Present Illness     Reason for visit:  Sore spot above right testicle  Symptom onset:  1-2 weeks ago  Symptoms include:  Mild pain if touched  Symptom intensity:  Mild  Symptom progression:  Improving  Had these symptoms before:  No  What makes it worse:  Bumping it or drying off after shower  What makes it better:  Tylenol and ice pack    He eats 0-1 servings of fruits and vegetables daily.He consumes 2 sweetened beverage(s) daily.He exercises with enough effort to increase his heart rate 30 to 60 minutes per day.  He exercises with enough effort to increase his heart rate 3 or less days per week.   He is taking medications regularly.       Patient is a 26 year old male who presents today with concerns about ongoing pain in the right groin/testicle. Pain began about 1 month ago while moving boxes out of his apartment. He was seen at the [facility] ED following this as he was concerned about a hernia. Review of this visit notes that no hernia present and was instructed on care for suspected muscular strain. About 2 weeks after this visit he noticed pain in the right testicle while showering. Said that it was quite  "tender to contact while toweling off. He found a hard spot above the right testicle. Seen at [facility] in [place] (I do not have documentation from this visit). Says that they did not feel the mass needed emergent evaluation.     Since this visit he has continued to experience increased sensitivity to the right testicle when bumped. He is in a monogamous relationship x7 years. Little concern for STI. 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 No changes with bowel movements or urination. Pain does not radiate. Improves with application of ice.     Review of Systems   Constitutional, HEENT, cardiovascular, pulmonary, gi and gu systems are negative, except as otherwise noted.      Objective    /72   Pulse 80   Temp 97  F (36.1  C) (Temporal)   Resp 16   Ht 1.72 m (5' 7.72\")   Wt 63.3 kg (139 lb 8 oz)   SpO2 100%   BMI 21.39 kg/m    Body mass index is 21.39 kg/m .  Physical Exam   GENERAL: healthy, alert and no distress  RESP: lungs clear to auscultation - no rales, rhonchi or wheezes  CV: regular rate and rhythm, normal S1 S2, no S3 or S4, no murmur, click or rub, no peripheral edema and peripheral pulses strong  ABDOMEN: soft, nontender, no hepatosplenomegaly, no masses and bowel sounds normal   (male): normal male genitalia without lesions or urethral discharge, no hernia, small soft mass superior to right testicle, tender to palpation, mobile, unable to illuminate           Answers for HPI/ROS submitted by the patient on 3/4/2022  If you checked off any problems, how difficult have these problems made it for you to do your work, take care of things at home, or get along with other people?: Not difficult at all  PHQ9 TOTAL SCORE: 1      "

## 2022-03-04 NOTE — PATIENT INSTRUCTIONS
Patient Education     Testicular Pain, Unclear Cause   You have had pain in one or both testicles. Based on your exam today, the exact cause of your pain is not certain. But your condition doesn't appear to be dangerous. Testicles are very sensitive. Even a small injury can cause quite a bit of pain. Other possible causes of testicular pain include kidney stones, cysts, mumps, inflammatory conditions, chronic conditions, hernia, infection, and a twisted testicle.  Certain tests may be done to rule out an underlying problem causing the pain. Nothing conclusive was found today. Most likely, the pain will go away on its own. If it doesn t, you may need more tests.    Home care  Medicine may be prescribed to help relieve pain and swelling. This may be an over-the-counter pain reliever or prescription pain medicine. Take all medicine as directed.  The following are general care guidelines:    To relieve pain and swelling, apply an ice pack wrapped in a thin towel for 10 minutes at a time. Continue this on and off for 1 to 2 days.    When lying down, place a small rolled towel under your scrotum. When moving around, wear a jockstrap (athletic supporter) or supportive underwear. These will help support and protect your testicles.    If it hurts to walk, walk as little as possible until you feel better.    Don't do any strenuous activity until you feel better.    Don't have sex until you feel better.    If you have severe pain in the testicle, seek care right away. Delay may lead to permanent loss of the testicle s function.  Follow-up care  Follow up with your healthcare provider, or as advised.  When to seek medical advice  Call your healthcare provider right away if any of these occur:    Fever of 100.4 F (38 C) or higher, or as directed by your provider    Worsening of the pain or severe pain    Swelling of the testicle or scrotum    A lump in the scrotum    Warm and red scrotum (signs of infection)    Nausea and  vomiting    Pain or swelling in abdomen    Trouble peeing    Numbness or weakness in the leg    Shrinking of the testicle    Blood in your urine  [employer] last reviewed this educational content on 9/1/2019 2000-2021 [employer]. All rights reserved. This information is not intended as a substitute for professional medical care. Always follow your healthcare professional's instructions.

## 2022-03-05 ASSESSMENT — PATIENT HEALTH QUESTIONNAIRE - PHQ9: SUM OF ALL RESPONSES TO PHQ QUESTIONS 1-9: 1

## 2022-03-07 ENCOUNTER — HOSPITAL ENCOUNTER (OUTPATIENT)
Dept: ULTRASOUND IMAGING | Facility: CLINIC | Age: 27
Discharge: HOME | End: 2022-03-07
Attending: PHYSICIAN ASSISTANT | Admitting: PHYSICIAN ASSISTANT
Payer: MEDICAID

## 2022-03-07 DIAGNOSIS — N50.89 SCROTAL MASS: ICD-10-CM

## 2022-03-07 PROCEDURE — 76870 US EXAM SCROTUM: CPT

## 2022-03-07 NOTE — PROGRESS NOTES
Steven is a 26 year old who is being evaluated via a billable video visit.      How would you like to obtain your AVS? MyChart  If the video visit is dropped, the invitation should be resent by: Send to e-mail at: No e-mail address on record  Will anyone else be joining your video visit? No    Video Start Time: 1:17pm      Video-Visit Details    Type of service:  Video Visit    Video End Time:1:46 pm    Originating Location (pt. 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  Steven was treated according to JCO phase 2 protocol with Cisplatin/Etoposide and reduced dose radiation, he comes to clinic today for his initial cancer survivorship visit.  He is being seen over video.    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  He was treated at the [facility] by Dr. Luis Alberto Melo.  He received chemotherapy as per the JCO phase 2 study looking at Cisplatin/Etoposide and reduced dose radiation.  He received the following chemotherapy:  1.  Cisplatin IV with a cumulative dose 400 mg/m2  2.  Etoposide IV with a cumulative dose of 2000 mg/m2    Steven received radiation therapy as follows:  1.  Pineal gland which started on 7/23/2015 and completed on 8/18/2015 in 17 fractions for a total dose of 3060 cGY (tomotherapy) with Dr. Eduardo donis at the [facility]    Steven had the following surgeries:  Past Surgical History:   Procedure Laterality Date     INSERT CHEST TUBE Left 11/10/2014    Procedure: INSERT CHEST TUBE;  Surgeon: Damaso Westbrook MD;  Location: [facility] OR     REMOVE PORT VASCULAR ACCESS Right 10/12/2015    Procedure: REMOVE PORT VASCULAR ACCESS;  Surgeon: Renato Arroyo  "MD Choco;  Location: [facility] PEDS SEDATION      THORACOSCOPIC BLEBECTOMY Left 11/13/2014    Procedure: THORACOSCOPIC BLEBECTOMY;  Surgeon: Damaso Westbrook MD;  Location: [facility] OR     THORACOSCOPIC PLEURODESIS Left 11/13/2014    Procedure: THORACOSCOPIC PLEURODESIS;  Surgeon: Damaso Westbrook MD;  Location: [facility] OR     VENTRICULOSCOPY Right 3/25/2015    Procedure: VENTRICULOSCOPY;  Surgeon: Vasile Boyd MD;  Location: [facility] OR     Late effects known to date include:  1.  Papilledema (resolved)  2.  Double vision (ongoing)  3.  Intermittent esotropia (ongoing)  4.  Dental caries    History of Present Illness:  Steven is here over video.  He is unaccompanied.  He has been doing well.  He had an MRI brain in [facility] yesterday.  Of note, he gets MRIs without contrast due to his allergy.  He hasn't been to the eye dr in a while.  Still reports having the same double vision issue.  Occasionally his mom will note that his eyes look \"lazy.\"  He hasn't noticed himself but he says that his mom has mentioned it to him.  Has been having a lot of issues with his teeth.  Has a lot of crowns and looking to get some partial dentures now.  He has some bone loss in his jaw so they didn't want to try implants.  Last went to the dentist 1 month ago.  Has been going to school full time to learn electrical engineering.  Also working PT at [employer].  This is his last semester in school.  No issues with HA.  Has been tired but he thinks that is related to his busy schedule.  He sleeps 6-7 hours per night.  No dizziness.  No heart issues.  No N/V/D/C.  Appetite has been good.  Did have COVID in 1/2022.  Mild symptoms with 10 days of low grade fever and congestion.  No residual issues.  No recent fractures.      Review of systems:  General:   Good appetite, strong energy level, sleeping well.  No fever, no pain.  HEENT:  No changes in vision or hearing.  No headache.  No rhinorrhea.     Respiratory: No SOB.  No coughing or " wheezing.  Cardiovascular: No chest pain or palpitations  Endocrine:  No hot/cold intolerance.  No increase thirst or urination.  GI:  No nausea, vomiting, diarrhea or constipation.  No abdominal pain.  : No difficulty with urination.  Skin: No rashes, bruises, petechiae or other skin lesions noted.  Neuro: No weakness or numbness.  MSK:  No change in ROM or function.  The remainder of review of systems is complete and negative.    PMH:   Past Medical History:   Diagnosis Date     Cancer (H)     germinoma brain cancer     Hydrocephalus (H) 2015    See MRI/CT     Infectious mononucleosis     age 7     Other chronic pain     headaches     Pineal tumor 2015    see MRI/CT     Pneumonia 9/13/2011     Pneumothorax on left 11/2014    spontaneous     Spontaneous pneumothorax 11/9/2014       [facility]:   Family History   Problem Relation Age of Onset     Family History Negative Other        Social History: Lives in [place].  In school for electrical engineering with 1 semester left.  Working PT at [employer].    Current Medications: has a current medication list which includes the following prescription(s): acetaminophen, levofloxacin, and naproxen.    Physical Exam: There were no vitals taken for this visit.   GENERAL: Healthy, alert and no distress  EYES: Eyes grossly normal to inspection.  No discharge or erythema, or obvious scleral/conjunctival abnormalities.  HENT: Normal cephalic/atraumatic.  External ears, nose and mouth without ulcers or lesions.  No nasal drainage visible.  RESP: No audible wheeze, cough, or visible cyanosis.  No visible retractions or increased work of breathing.    SKIN: Visible skin clear. No significant rash, abnormal pigmentation or lesions.  NEURO: Cranial nerves grossly intact.  Mentation and speech appropriate for age.  PSYCH: Mentation appears normal, affect normal/bright, judgement and insight intact, normal speech and appearance well-groomed.      Labs:   Lab on 02/28/2022   Component Date  Value Ref Range Status     Sodium 02/28/2022 137  133 - 144 mmol/L Final     Potassium 02/28/2022 4.0  3.4 - 5.3 mmol/L Final     Chloride 02/28/2022 104  94 - 109 mmol/L Final     Carbon Dioxide (CO2) 02/28/2022 29  20 - 32 mmol/L Final     Anion Gap 02/28/2022 4  3 - 14 mmol/L Final     Urea Nitrogen 02/28/2022 9  7 - 30 mg/dL Final     Creatinine 02/28/2022 0.79  0.66 - 1.25 mg/dL Final     Calcium 02/28/2022 9.0  8.5 - 10.1 mg/dL Final     Glucose 02/28/2022 100 (A) 70 - 99 mg/dL Final     Alkaline Phosphatase 02/28/2022 50  40 - 150 U/L Final     AST 02/28/2022 14  0 - 45 U/L Final     ALT 02/28/2022 18  0 - 70 U/L Final     Protein Total 02/28/2022 7.4  6.8 - 8.8 g/dL Final     Albumin 02/28/2022 4.3  3.4 - 5.0 g/dL Final     Bilirubin Total 02/28/2022 0.4  0.2 - 1.3 mg/dL Final     GFR Estimate 02/28/2022 >90  >60 mL/min/1.73m2 Final    Effective December 21, 2021 eGFRcr in adults is calculated using the 2021 CKD-EPI creatinine equation which includes age and gender (Tushar et al., [facility], DOI: 10.1056/RVAMsn3634963)     TSH 02/28/2022 1.79  0.40 - 4.00 mU/L Final     Free T4 02/28/2022 0.87  0.76 - 1.46 ng/dL Final     Hemoglobin A1C 02/28/2022 5.5  0.0 - 5.6 % Final    Normal <5.7%   Prediabetes 5.7-6.4%    Diabetes 6.5% or higher     Note: Adopted from ADA consensus guidelines.     WBC Count 02/28/2022 5.1  4.0 - 11.0 10e3/uL Final     RBC Count 02/28/2022 4.95  4.40 - 5.90 10e6/uL Final     Hemoglobin 02/28/2022 14.8  13.3 - 17.7 g/dL Final     Hematocrit 02/28/2022 42.9  40.0 - 53.0 % Final     MCV 02/28/2022 87  78 - 100 fL Final     MCH 02/28/2022 29.9  26.5 - 33.0 pg Final     MCHC 02/28/2022 34.5  31.5 - 36.5 g/dL Final     RDW 02/28/2022 12.0  10.0 - 15.0 % Final     Platelet Count 02/28/2022 249  150 - 450 10e3/uL Final     % Neutrophils 02/28/2022 46  % Final     % Lymphocytes 02/28/2022 42  % Final     % Monocytes 02/28/2022 9  % Final     % Eosinophils 02/28/2022 1  % Final     % Basophils  02/28/2022 1  % Final     % Immature Granulocytes 02/28/2022 1  % Final     NRBCs per 100 WBC 02/28/2022 0  <1 /100 Final     Absolute Neutrophils 02/28/2022 2.4  1.6 - 8.3 10e3/uL Final     Absolute Lymphocytes 02/28/2022 2.2  0.8 - 5.3 10e3/uL Final     Absolute Monocytes 02/28/2022 0.4  0.0 - 1.3 10e3/uL Final     Absolute Eosinophils 02/28/2022 0.1  0.0 - 0.7 10e3/uL Final     Absolute Basophils 02/28/2022 0.0  0.0 - 0.2 10e3/uL Final     Absolute Immature Granulocytes 02/28/2022 0.0  <=0.4 10e3/uL Final     Absolute NRBCs 02/28/2022 0.0  10e3/uL Final        Radiology:  MR BRAIN W/O CONTRAST 2/28/2022 3:57 PM     INDICATION: Brain mass or lesion; ; Pineal germ cell tumor (H)  TECHNIQUE: Noncontrast MRI of the brain.  CONTRAST: None  COMPARISON: February 18, 2021     FINDINGS:  No abnormal restricted diffusion to suggest acute infarct.  Postsurgical changes right frontal craniotomy with surgical tract  through the anterolateral aspect of the right frontal lobe for  resection of a pineal germ cell tumor. No findings to suggest residual  or recurrent tumor on these noncontrast images. Brain signal  morphology otherwise normal. The ventricles and sulci are normal for  age. No evidence of acute hemorrhage, mass effect, or extra-axial  collection. No cerebellar tonsillar ectopia.     The globes are unremarkable. Impression enlarged paranasal sinuses are  unremarkable. Trace left mastoid. The right mastoid air cells are  unremarkable. The visualized skull base and calvarium are  unremarkable.                                                                      IMPRESSION:    1.  Postsurgical changes right frontal craniotomy with surgical tract  through the anterolateral aspect of the right frontal lobe for  resection of a pineal germ cell tumor. No findings to suggest residual  or recurrent tumor on these noncontrast images.   2.  No evidence of acute internal hemorrhage, mass effect, or  infarction     DIPAK  MD VERONICA          Assessment:  Steven Romeo is a 26 year old  male with a pineal germ cell tumor treated according to JCO phase 2 protocol with Cisplatin and Etoposide/reduced radiation who comes to clinic today (video) for his initial cancer survivorship visit.  He had imaging yesterday that showed no evidence of disease recurrence or secondary neoplasm.  He was reviewed at our comprehensive neuro-oncology conference today and they agreed with the MRI findings.  He continues to have issues with double vision and dental caries.  The following are our long term follow up recommendations:      Plan:     1.  RISK OF RECURRENCE:  Steven has a history of pineal GCT that completed therapy in 8/2015.  He has been off therapy more than 5 years with no signs of disease recurrence.  We discussed transitioning to every other year MRIs due to his radiation history and risk for secondary neoplasm.  We will plan for another MRI in 2024.    2.  PSYCHOSOCIAL EFFECTS:  Steven received cranial radiation and risk for learning issues.  He is currently in college and reports no concerns.  If he has any new issues with attention span, memory, processing speed then we would recommend he have formal neuropsychological testing at that time. He will also have a routine assessment with social work.    3.  RISK FOR HEARING LOSS:  Steven has a history of cranial radiation and Cisplatin chemotherapy with risk for hearing loss.  No current concerns with hearing.  Consider repeat hearing testing at future appt.    4.  RISK FOR RENAL TOXICITY: Stevne has a history of cisplatin chemo and risk for toxicity to his kidneys.  CMP done yesterday was normal.  We will plan to recheck as needed.  Review of recent visits and BP was normal.  Will recheck urinary history at future appts.    5.  MALE ISSUES RELATED TO FERTILITY:  Steven has a history of heavy metal chemotherapy and radiation which can affect his fertility.  We discussed that if Steven would like  to know his fertility status, we could perform a semen analysis in our andrology lab.    6.  RISK FOR SECONDARY NEOPLASMS: Steven has a history of cranial radiation and chemotherapy which increases the risk of secondary neoplasms.  He should have a yearly history for signs of anemia, infection and bleeding.  Consider yearly CBC until 10 years off therapy.  In addition, he should have an MRI brain every other year now due to risk for secondary brain tumors s/p radiation.  He should wear SPF 50 sunscreen when outdoors.  Do not smoke or use recreational drugs; drinking alcohol only in moderation.      7. VISION:  Steven has a history of CNS surgery and radiation.  He has some residual issues with double vision.  He should have a yearly eye dr visit.  We will help him set one up for the near future.    8.  DENTAL:  Steven has a history of CNS radiation and risk for issues with dental caries and other dental abnormalities in the radiated tissue.  He should see the dentist every 6 months for check ups as well as doing diligent dental care at home.      Education:  Reviewed health links with Steven and mailed copies to the home.    It was a pleasure meeting Steven in our [facility] clinic today.  We appreciate the opportunity to participate in his care.  If you have any questions or concerns, I can be reached at [phone].  We ask that Steven return to the clinic in 1 year with labs.  We will plan for another MRI in 2 years.    Karina Mary MSN, APRN, CPNP-AC, CPON  Department of Pediatrics  Division of Hematology/Oncology    Review of the result(s) of each unique test - CBC, Hgb A1C, CMP, TSH, free T4, MRI brain  Discussion of management or test interpretation with external physician/other qualified healthcare professional/appropriate source - Discussion with Dr. Melo at neuro oncology conference 3/1/22  Total time spent on the following services on the date of the encounter:  Preparing to see patient, chart review, review of  outside records, Ordering medications, test, procedures, chemotherapy, Referring or communicating with other healthcare professionals, Interpretation of labs, imaging and other tests, Performing a medically appropriate examination , Counseling and educating the patient/family/caregiver , Documenting clinical information in the electronic or other health record , Communicating results to the patient/family/caregiver , Care coordination  and Total time spent: 65 min with additional chart review for treatment summary

## 2022-04-01 ENCOUNTER — OFFICE VISIT (OUTPATIENT)
Dept: FAMILY MEDICINE | Facility: OTHER | Age: 27
End: 2022-04-01
Payer: MEDICAID

## 2022-04-01 VITALS
BODY MASS INDEX: 21.31 KG/M2 | OXYGEN SATURATION: 98 % | SYSTOLIC BLOOD PRESSURE: 120 MMHG | HEART RATE: 75 BPM | DIASTOLIC BLOOD PRESSURE: 70 MMHG | TEMPERATURE: 97.5 F | WEIGHT: 139 LBS

## 2022-04-01 DIAGNOSIS — N43.40 SPERMATOCELE: Primary | ICD-10-CM

## 2022-04-01 PROCEDURE — 99207 PR NO CHARGE LOS: CPT | Performed by: PHYSICIAN ASSISTANT

## 2022-04-01 ASSESSMENT — PAIN SCALES - GENERAL: PAINLEVEL: NO PAIN (1)

## 2022-04-01 NOTE — PROGRESS NOTES
Assessment & Plan     Spermatocele  Patient had not seen the [employer] message offering urologist referral if symptoms persisted. He would like to do this today. I have put in the referral and he will schedule at his convenience. He is managing his discomfort with tylenol, supportive undergarments and cool packs. Referral placed and appointment cancelled.   - Adult Urology Referral; Future     CLIVE Roberson [facility] YAMILETH Harris is a 26 year old who presents for the following health issues     History of Present Illness       Reason for visit:  Follow up  Symptom onset:  More than a month  Symptoms include:  Small bump above testicle  Symptom intensity:  Mild  Symptom progression:  Improving  Had these symptoms before:  No  What makes it worse:  Touch  What makes it better:  Ice pack    He eats 0-1 servings of fruits and vegetables daily.He consumes 2 sweetened beverage(s) daily.He exercises with enough effort to increase his heart rate 20 to 29 minutes per day.  He exercises with enough effort to increase his heart rate 3 or less days per week.   He is taking medications regularly.     Patient scheduled appointment as he had not seen the [employer] message offering urologist referral if symptoms persisted. He would like to meet with urologist as the area continues to be tender to contact.

## 2022-04-01 NOTE — PATIENT INSTRUCTIONS
Epididymal cyst/spermatocele -- An epididymal cyst is generally asymptomatic and palpated as a soft round mass in the head of the epididymis or rete testis (figure 1). An epididymal cyst that is larger than 2 cm is called a spermatocele. Epididymal cysts occur with increased frequency in male offspring of mothers who used diethylstilbestrol during pregnancy. They do not generally require treatment, but spermatoceles may rarely necessitate surgical excision because of chronic pain.  The main differential diagnosis of an epididymal cyst is epididymal cystadenoma or, rarely, cystadenocarcinoma. Cystadenomas typically present on examination as a  cluster of grapes  because of their multiple septations and occasionally large size. Cystadenocarcinomas may have palpably firmer areas. Whenever more complex scrotal masses are encountered, an ultrasound should be performed. More worrisome lesions will typically have areas of nodularity noted on ultrasound. Cystadenomas are often bilateral and are seen in more than one-half of patients with Von Hippel-Lindau disease

## 2022-04-21 ENCOUNTER — OFFICE VISIT (OUTPATIENT)
Dept: OPHTHALMOLOGY | Facility: CLINIC | Age: 27
End: 2022-04-21
Attending: OPHTHALMOLOGY
Payer: MEDICAID

## 2022-04-21 DIAGNOSIS — H53.40 VISUAL FIELD DEFECT: Primary | ICD-10-CM

## 2022-04-21 DIAGNOSIS — H50.05 ESOTROPIA, ALTERNATING: Primary | ICD-10-CM

## 2022-04-21 DIAGNOSIS — H53.40 VISUAL FIELD DEFECT: ICD-10-CM

## 2022-04-21 PROCEDURE — 92133 CPTRZD OPH DX IMG PST SGM ON: CPT | Performed by: OPHTHALMOLOGY

## 2022-04-21 PROCEDURE — 92060 SENSORIMOTOR EXAMINATION: CPT | Mod: 26 | Performed by: STUDENT IN AN ORGANIZED HEALTH CARE EDUCATION/TRAINING PROGRAM

## 2022-04-21 PROCEDURE — 92014 COMPRE OPH EXAM EST PT 1/>: CPT | Mod: GC | Performed by: STUDENT IN AN ORGANIZED HEALTH CARE EDUCATION/TRAINING PROGRAM

## 2022-04-21 PROCEDURE — G0463 HOSPITAL OUTPT CLINIC VISIT: HCPCS | Performed by: TECHNICIAN/TECHNOLOGIST

## 2022-04-21 PROCEDURE — 92060 SENSORIMOTOR EXAMINATION: CPT | Performed by: OPHTHALMOLOGY

## 2022-04-21 PROCEDURE — G0463 HOSPITAL OUTPT CLINIC VISIT: HCPCS | Mod: 25

## 2022-04-21 PROCEDURE — 92083 EXTENDED VISUAL FIELD XM: CPT | Mod: 26 | Performed by: STUDENT IN AN ORGANIZED HEALTH CARE EDUCATION/TRAINING PROGRAM

## 2022-04-21 PROCEDURE — 92083 EXTENDED VISUAL FIELD XM: CPT | Performed by: OPHTHALMOLOGY

## 2022-04-21 PROCEDURE — 92133 CPTRZD OPH DX IMG PST SGM ON: CPT | Mod: 26 | Performed by: STUDENT IN AN ORGANIZED HEALTH CARE EDUCATION/TRAINING PROGRAM

## 2022-04-21 ASSESSMENT — EXTERNAL EXAM - RIGHT EYE: OD_EXAM: NORMAL

## 2022-04-21 ASSESSMENT — TONOMETRY
OS_IOP_MMHG: 19
OD_IOP_MMHG: 19
IOP_METHOD: ICARE

## 2022-04-21 ASSESSMENT — SLIT LAMP EXAM - LIDS
COMMENTS: NORMAL
COMMENTS: NORMAL

## 2022-04-21 ASSESSMENT — CUP TO DISC RATIO
OD_RATIO: 0.25
OS_RATIO: 0.35

## 2022-04-21 ASSESSMENT — EXTERNAL EXAM - LEFT EYE: OS_EXAM: NORMAL

## 2022-04-21 ASSESSMENT — CONF VISUAL FIELD
OD_NORMAL: 1
METHOD: COUNTING FINGERS
OS_NORMAL: 1

## 2022-04-21 ASSESSMENT — VISUAL ACUITY
OS_SC: 20/20
METHOD: SNELLEN - LINEAR
OD_SC: 20/20

## 2022-04-21 NOTE — PROGRESS NOTES
Assessment & Plan     Steven Romeo is a 26 year old male with the following diagnoses:   1. Esotropia, alternating    2. Visual field defect        Patient is a 26-year-old male with a history of pineal germ cell tumor which was diagnosed in 2015.  He reports that he started having horizontal diplopia even before the diagnosis. Of note he was treated with chemotherapy according to protocol JCO phase 2 with cisplatin and etoposide, then received radiation therapy for this. Of note he was last seen by Dr. Karina Mary in March 1, 2022, with no findings of disease recurrence or secondary neoplasm, and tumor is currently in remission.    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  He was last seen in the eye clinic in August 2019, and at that point he was having stable diplopia.    Today he reports that he still having ongoing double vision with looking at about 45 degrees lateral to the visual axis primary gaze. This has remained stable for a while. Denies any headaches currently, and denies any numbness or weakness.    Of note, on examination today he sees 20/20 in both eyes. Intraocular pressure within normal limits today.  Pupillary examination unremarkable without APD.  Patient has full confrontational visual fields.  There is trace abduction deficit present in both eyes.  He has full color vision.  On primary gaze he has a flick degree of exophoria present, and small angle exotropia in side gazes. Slit lamp examination overall within normal limits. No disc edema / papilledema present on evaluation.     OCT RNFL, 4/21/2022.   - Right eye G equals 95, overall normal without disc edema.  Mild improvements in overall thickness compared to the prior scan in August 2019.  - Left eye  G equals 93, overall normal without disc edema, borderline thinning superiorly.  Mild improvements in overall thickness compared to the prior scan in August 2019.    Visual Fields, 4/21/22.  - Right eye, reliable.  Mean deviation equals 0.1, pattern standard deviation equals 1.3.  Full visual field with very mild nonspecific deficits present.  - Left eye, reliable.  Mean deviation equals -0.9, pattern standard deviation equals 1.6. Full visual field with very mild nonspecific deficits present.    MRI Brain 2/28/22:                                                               IMPRESSION:    1.  Postsurgical changes right frontal craniotomy with surgical tract  through the anterolateral aspect of the right frontal lobe for  resection of a pineal germ cell tumor. No findings to suggest residual  or recurrent tumor on these noncontrast images.   2.  No evidence of acute internal hemorrhage, mass effect, or  Infarction.    On assessment, patient's diplopia is stable in nature, has esotropia with lateral gazes in both sides, consistent with the bilateral cranial nerve VI palsy in the setting of previous obstructive hydrocephalus status post ventriculostomy.  On examination, there is no papilledema present, additionally OCT RNFL reveals no disc edema, and overall improvement in overall thickness compared to prior examination in 2019.  Visual field is good bilaterally.  Evaluation with tumor board team without signs of recurrence.  Patient is doing well, graduating college soon with a degree in robotics and electrical engineering.  Recommend follow-up in about 2 years for further evaluation.  Sooner if any changes.      Aashish Sinha MD - PGY3  Department of Ophthalmology  Pager: [phone]     Attending Physician Attestation:  Complete documentation of historical and exam elements from today's encounter can be found in the full encounter summary report (not reduplicated in this progress note).  I personally obtained  the chief complaint(s) and history of present illness.  I confirmed and edited as necessary the review of systems, past medical/surgical history, family history, social history, and examination findings as documented by others; and I examined the patient myself.  I personally reviewed the relevant tests, images, and reports as documented above.  I formulated and edited as necessary the assessment and plan and discussed the findings and management plan with the patient and family. I personally reviewed the ophthalmic test(s) associated with this encounter, agree with the interpretation(s) as documented by the resident/fellow, and have edited the corresponding report(s) as necessary.  - Dallas Mary MD

## 2022-06-03 ENCOUNTER — TELEPHONE (OUTPATIENT)
Dept: CARE COORDINATION | Facility: CLINIC | Age: 27
End: 2022-06-03
Payer: MEDICAID

## 2022-06-03 NOTE — TELEPHONE ENCOUNTER
SW received call from pt's mother inquiring about possible documentation so pt can have darker window tint on his car. Pt's mother plans to speak with ophthalmologist regarding this concern and SW encouraged that this would be the best starting place. Pt's mother reports she will call with any other questions or concerns.     LETA Lofton, LGSW    Pediatric Hematology Oncology   [facility]   Tuesday-Friday  Phone: [phone]  Pager: [phone]     NO LETTER

## 2022-08-24 ENCOUNTER — LAB (OUTPATIENT)
Dept: URGENT CARE | Facility: URGENT CARE | Age: 27
End: 2022-08-24
Payer: MEDICAID

## 2022-08-24 DIAGNOSIS — Z20.822 SUSPECTED COVID-19 VIRUS INFECTION: ICD-10-CM

## 2022-08-24 PROCEDURE — U0005 INFEC AGEN DETEC AMPLI PROBE: HCPCS

## 2022-08-24 PROCEDURE — U0003 INFECTIOUS AGENT DETECTION BY NUCLEIC ACID (DNA OR RNA); SEVERE ACUTE RESPIRATORY SYNDROME CORONAVIRUS 2 (SARS-COV-2) (CORONAVIRUS DISEASE [COVID-19]), AMPLIFIED PROBE TECHNIQUE, MAKING USE OF HIGH THROUGHPUT TECHNOLOGIES AS DESCRIBED BY CMS-2020-01-R: HCPCS

## 2022-08-24 PROCEDURE — 99207 PR NO CHARGE LOS: CPT

## 2022-08-25 LAB — SARS-COV-2 RNA RESP QL NAA+PROBE: NEGATIVE

## 2022-11-10 NOTE — TELEPHONE ENCOUNTER
"Writer received call from Steven's mom, Presley, inquiring on the MA application that ROSELYN assisted them with last month. Presley shared that she needs help filling out the Appendix A form, which explores other health insurance options available to the patient/family. Writer walked Presley through the paperwork over the phone and encouraged her to speak with her employer about some of the questions that she was unable to answer.     Presley informed writer that Steven was prescribed medication for his anxiety. She stated that she wants him to see psychiatrist in their home area but also really liked someone named \"Roberto\" that they saw at [facility] a few years ago. She could not recall Roberto's last name or any additional information. Presley is going to speak with Steven to determine where he would like to seek psychiatry care for medication management and will then follow up with ROSELYN for referrals.    Social work will continue to assess needs and provide ongoing psychosocial support and access to resources.       LETA Alvares, [facility]    Phone: [phone]  Pager: [phone]  Email: [email or web address]  6/4/2021  *no letter*    " Azithromycin Pregnancy And Lactation Text: This medication is considered safe during pregnancy and is also secreted in breast milk.

## 2022-11-20 ENCOUNTER — HEALTH MAINTENANCE LETTER (OUTPATIENT)
Age: 27
End: 2022-11-20

## 2023-04-15 ENCOUNTER — HEALTH MAINTENANCE LETTER (OUTPATIENT)
Age: 28
End: 2023-04-15

## 2024-06-22 ENCOUNTER — HEALTH MAINTENANCE LETTER (OUTPATIENT)
Age: 29
End: 2024-06-22

## 2025-07-12 ENCOUNTER — HEALTH MAINTENANCE LETTER (OUTPATIENT)
Age: 30
End: 2025-07-12